# Patient Record
Sex: FEMALE | Race: WHITE | NOT HISPANIC OR LATINO | Employment: OTHER | URBAN - METROPOLITAN AREA
[De-identification: names, ages, dates, MRNs, and addresses within clinical notes are randomized per-mention and may not be internally consistent; named-entity substitution may affect disease eponyms.]

---

## 2017-05-30 ENCOUNTER — HOSPITAL ENCOUNTER (OUTPATIENT)
Dept: CT IMAGING | Facility: CLINIC | Age: 67
Discharge: HOME | End: 2017-05-30
Attending: PSYCHIATRY & NEUROLOGY | Admitting: PSYCHIATRY & NEUROLOGY
Payer: MEDICARE

## 2017-05-30 DIAGNOSIS — S06.9XAA: ICD-10-CM

## 2017-05-30 DIAGNOSIS — S06.5XAA SUBDURAL HEMATOMA (H): ICD-10-CM

## 2017-05-30 PROCEDURE — 70450 CT HEAD/BRAIN W/O DYE: CPT

## 2018-01-22 ENCOUNTER — HOSPITAL ENCOUNTER (EMERGENCY)
Facility: CLINIC | Age: 68
Discharge: ANOTHER HOSPITAL | End: 2018-01-23
Attending: EMERGENCY MEDICINE | Admitting: EMERGENCY MEDICINE
Payer: MEDICARE

## 2018-01-22 ENCOUNTER — APPOINTMENT (OUTPATIENT)
Dept: CT IMAGING | Facility: CLINIC | Age: 68
End: 2018-01-22
Attending: EMERGENCY MEDICINE
Payer: MEDICARE

## 2018-01-22 DIAGNOSIS — R41.82 ALTERED MENTAL STATUS, UNSPECIFIED ALTERED MENTAL STATUS TYPE: ICD-10-CM

## 2018-01-22 LAB
ALBUMIN SERPL-MCNC: 3.3 G/DL (ref 3.4–5)
ALP SERPL-CCNC: 95 U/L (ref 40–150)
ALT SERPL W P-5'-P-CCNC: 16 U/L (ref 0–50)
AMMONIA PLAS-SCNC: 18 UMOL/L (ref 10–50)
ANION GAP SERPL CALCULATED.3IONS-SCNC: 12 MMOL/L (ref 3–14)
AST SERPL W P-5'-P-CCNC: 18 U/L (ref 0–45)
BASE EXCESS BLDV CALC-SCNC: 0.2 MMOL/L
BASOPHILS # BLD AUTO: 0 10E9/L (ref 0–0.2)
BASOPHILS NFR BLD AUTO: 0.3 %
BILIRUB SERPL-MCNC: 0.5 MG/DL (ref 0.2–1.3)
BUN SERPL-MCNC: 24 MG/DL (ref 7–30)
CALCIUM SERPL-MCNC: 8.8 MG/DL (ref 8.5–10.1)
CHLORIDE SERPL-SCNC: 104 MMOL/L (ref 94–109)
CO2 SERPL-SCNC: 25 MMOL/L (ref 20–32)
CREAT SERPL-MCNC: 0.8 MG/DL (ref 0.52–1.04)
DIFFERENTIAL METHOD BLD: ABNORMAL
EOSINOPHIL # BLD AUTO: 0 10E9/L (ref 0–0.7)
EOSINOPHIL NFR BLD AUTO: 0.4 %
ERYTHROCYTE [DISTWIDTH] IN BLOOD BY AUTOMATED COUNT: 13.4 % (ref 10–15)
ETHANOL SERPL-MCNC: <0.01 G/DL
GFR SERPL CREATININE-BSD FRML MDRD: 72 ML/MIN/1.7M2
GLUCOSE SERPL-MCNC: 104 MG/DL (ref 70–99)
HCO3 BLDV-SCNC: 26 MMOL/L (ref 21–28)
HCT VFR BLD AUTO: 48.9 % (ref 35–47)
HGB BLD-MCNC: 15.7 G/DL (ref 11.7–15.7)
IMM GRANULOCYTES # BLD: 0.1 10E9/L (ref 0–0.4)
IMM GRANULOCYTES NFR BLD: 0.5 %
LYMPHOCYTES # BLD AUTO: 3.6 10E9/L (ref 0.8–5.3)
LYMPHOCYTES NFR BLD AUTO: 36.4 %
MAGNESIUM SERPL-MCNC: 2 MG/DL (ref 1.6–2.3)
MCH RBC QN AUTO: 29 PG (ref 26.5–33)
MCHC RBC AUTO-ENTMCNC: 32.1 G/DL (ref 31.5–36.5)
MCV RBC AUTO: 90 FL (ref 78–100)
MONOCYTES # BLD AUTO: 0.6 10E9/L (ref 0–1.3)
MONOCYTES NFR BLD AUTO: 6.4 %
NEUTROPHILS # BLD AUTO: 5.5 10E9/L (ref 1.6–8.3)
NEUTROPHILS NFR BLD AUTO: 56 %
NRBC # BLD AUTO: 0 10*3/UL
NRBC BLD AUTO-RTO: 0 /100
O2/TOTAL GAS SETTING VFR VENT: NORMAL %
OXYHGB MFR BLDV: 41 %
PCO2 BLDV: 47 MM HG (ref 40–50)
PH BLDV: 7.36 PH (ref 7.32–7.43)
PLATELET # BLD AUTO: 281 10E9/L (ref 150–450)
PO2 BLDV: 25 MM HG (ref 25–47)
POTASSIUM SERPL-SCNC: 3.3 MMOL/L (ref 3.4–5.3)
PROT SERPL-MCNC: 7.3 G/DL (ref 6.8–8.8)
RBC # BLD AUTO: 5.42 10E12/L (ref 3.8–5.2)
SODIUM SERPL-SCNC: 141 MMOL/L (ref 133–144)
T4 FREE SERPL-MCNC: 1.05 NG/DL (ref 0.76–1.46)
TROPONIN I SERPL-MCNC: <0.015 UG/L (ref 0–0.04)
TSH SERPL DL<=0.005 MIU/L-ACNC: 17.43 MU/L (ref 0.4–4)
WBC # BLD AUTO: 9.9 10E9/L (ref 4–11)

## 2018-01-22 PROCEDURE — 82805 BLOOD GASES W/O2 SATURATION: CPT | Performed by: EMERGENCY MEDICINE

## 2018-01-22 PROCEDURE — 84484 ASSAY OF TROPONIN QUANT: CPT | Performed by: EMERGENCY MEDICINE

## 2018-01-22 PROCEDURE — 80053 COMPREHEN METABOLIC PANEL: CPT | Performed by: EMERGENCY MEDICINE

## 2018-01-22 PROCEDURE — 70450 CT HEAD/BRAIN W/O DYE: CPT

## 2018-01-22 PROCEDURE — 84439 ASSAY OF FREE THYROXINE: CPT | Performed by: EMERGENCY MEDICINE

## 2018-01-22 PROCEDURE — 82550 ASSAY OF CK (CPK): CPT | Performed by: EMERGENCY MEDICINE

## 2018-01-22 PROCEDURE — 85025 COMPLETE CBC W/AUTO DIFF WBC: CPT | Performed by: EMERGENCY MEDICINE

## 2018-01-22 PROCEDURE — 80320 DRUG SCREEN QUANTALCOHOLS: CPT | Performed by: EMERGENCY MEDICINE

## 2018-01-22 PROCEDURE — 82140 ASSAY OF AMMONIA: CPT | Performed by: EMERGENCY MEDICINE

## 2018-01-22 PROCEDURE — 84443 ASSAY THYROID STIM HORMONE: CPT | Performed by: EMERGENCY MEDICINE

## 2018-01-22 PROCEDURE — 83735 ASSAY OF MAGNESIUM: CPT | Performed by: EMERGENCY MEDICINE

## 2018-01-22 PROCEDURE — 99285 EMERGENCY DEPT VISIT HI MDM: CPT | Mod: 25

## 2018-01-22 PROCEDURE — 93005 ELECTROCARDIOGRAM TRACING: CPT

## 2018-01-22 ASSESSMENT — ENCOUNTER SYMPTOMS: CONFUSION: 1

## 2018-01-23 ENCOUNTER — HOSPITAL ENCOUNTER (OUTPATIENT)
Facility: CLINIC | Age: 68
Setting detail: OBSERVATION
Discharge: NURSING, REHAB OR LONG-TERM CARE | End: 2018-01-24
Attending: INTERNAL MEDICINE | Admitting: INTERNAL MEDICINE
Payer: MEDICARE

## 2018-01-23 VITALS
DIASTOLIC BLOOD PRESSURE: 90 MMHG | HEART RATE: 60 BPM | SYSTOLIC BLOOD PRESSURE: 115 MMHG | TEMPERATURE: 99 F | RESPIRATION RATE: 18 BRPM | OXYGEN SATURATION: 97 %

## 2018-01-23 DIAGNOSIS — Z98.890 HISTORY OF RESECTION OF MENINGIOMA: ICD-10-CM

## 2018-01-23 DIAGNOSIS — R41.0 CONFUSION: Primary | ICD-10-CM

## 2018-01-23 DIAGNOSIS — Z86.79 HISTORY OF SUBARACHNOID HEMORRHAGE: ICD-10-CM

## 2018-01-23 DIAGNOSIS — E03.9 ACQUIRED HYPOTHYROIDISM: ICD-10-CM

## 2018-01-23 DIAGNOSIS — Z86.03 HISTORY OF RESECTION OF MENINGIOMA: ICD-10-CM

## 2018-01-23 DIAGNOSIS — G62.9 PERIPHERAL POLYNEUROPATHY: ICD-10-CM

## 2018-01-23 LAB
ALBUMIN UR-MCNC: 30 MG/DL
ALBUMIN UR-MCNC: NEGATIVE MG/DL
AMPHETAMINES UR QL SCN: NEGATIVE
APPEARANCE UR: ABNORMAL
APPEARANCE UR: CLEAR
BARBITURATES UR QL: NEGATIVE
BENZODIAZ UR QL: NEGATIVE
BILIRUB UR QL STRIP: NEGATIVE
BILIRUB UR QL STRIP: NEGATIVE
CANNABINOIDS UR QL SCN: NEGATIVE
CK SERPL-CCNC: 192 U/L (ref 30–225)
COCAINE UR QL: NEGATIVE
COLOR UR AUTO: YELLOW
COLOR UR AUTO: YELLOW
FOLATE SERPL-MCNC: 16.2 NG/ML
GLUCOSE UR STRIP-MCNC: NEGATIVE MG/DL
GLUCOSE UR STRIP-MCNC: NEGATIVE MG/DL
HGB UR QL STRIP: ABNORMAL
HGB UR QL STRIP: ABNORMAL
INTERPRETATION ECG - MUSE: NORMAL
KETONES UR STRIP-MCNC: 80 MG/DL
KETONES UR STRIP-MCNC: 80 MG/DL
LEUKOCYTE ESTERASE UR QL STRIP: ABNORMAL
LEUKOCYTE ESTERASE UR QL STRIP: NEGATIVE
MUCOUS THREADS #/AREA URNS LPF: PRESENT /LPF
MUCOUS THREADS #/AREA URNS LPF: PRESENT /LPF
NITRATE UR QL: NEGATIVE
NITRATE UR QL: NEGATIVE
OPIATES UR QL SCN: NEGATIVE
PCP UR QL SCN: NEGATIVE
PH UR STRIP: 5 PH (ref 5–7)
PH UR STRIP: 5 PH (ref 5–7)
POTASSIUM SERPL-SCNC: 3.7 MMOL/L (ref 3.4–5.3)
RBC #/AREA URNS AUTO: 4 /HPF (ref 0–2)
RBC #/AREA URNS AUTO: 7 /HPF (ref 0–2)
SOURCE: ABNORMAL
SOURCE: ABNORMAL
SP GR UR STRIP: 1.02 (ref 1–1.03)
SP GR UR STRIP: 1.03 (ref 1–1.03)
SQUAMOUS #/AREA URNS AUTO: 14 /HPF (ref 0–1)
UROBILINOGEN UR STRIP-MCNC: 2 MG/DL (ref 0–2)
UROBILINOGEN UR STRIP-MCNC: 4 MG/DL (ref 0–2)
VIT B12 SERPL-MCNC: 1704 PG/ML (ref 193–986)
WBC #/AREA URNS AUTO: 2 /HPF (ref 0–2)
WBC #/AREA URNS AUTO: 33 /HPF (ref 0–2)

## 2018-01-23 PROCEDURE — 25000132 ZZH RX MED GY IP 250 OP 250 PS 637: Mod: GY | Performed by: INTERNAL MEDICINE

## 2018-01-23 PROCEDURE — 80307 DRUG TEST PRSMV CHEM ANLYZR: CPT | Performed by: EMERGENCY MEDICINE

## 2018-01-23 PROCEDURE — A9270 NON-COVERED ITEM OR SERVICE: HCPCS | Mod: GY | Performed by: INTERNAL MEDICINE

## 2018-01-23 PROCEDURE — 84132 ASSAY OF SERUM POTASSIUM: CPT | Performed by: INTERNAL MEDICINE

## 2018-01-23 PROCEDURE — 81001 URINALYSIS AUTO W/SCOPE: CPT | Mod: XU | Performed by: EMERGENCY MEDICINE

## 2018-01-23 PROCEDURE — 99207 ZZC APP CREDIT; MD BILLING SHARED VISIT: CPT | Performed by: INTERNAL MEDICINE

## 2018-01-23 PROCEDURE — G0378 HOSPITAL OBSERVATION PER HR: HCPCS

## 2018-01-23 PROCEDURE — 99204 OFFICE O/P NEW MOD 45 MIN: CPT | Performed by: NURSE PRACTITIONER

## 2018-01-23 PROCEDURE — 99220 ZZC INITIAL OBSERVATION CARE,LEVL III: CPT | Performed by: INTERNAL MEDICINE

## 2018-01-23 PROCEDURE — 82746 ASSAY OF FOLIC ACID SERUM: CPT | Performed by: INTERNAL MEDICINE

## 2018-01-23 PROCEDURE — 80177 DRUG SCRN QUAN LEVETIRACETAM: CPT | Performed by: INTERNAL MEDICINE

## 2018-01-23 PROCEDURE — 82550 ASSAY OF CK (CPK): CPT | Performed by: EMERGENCY MEDICINE

## 2018-01-23 PROCEDURE — 82607 VITAMIN B-12: CPT | Performed by: INTERNAL MEDICINE

## 2018-01-23 PROCEDURE — 36415 COLL VENOUS BLD VENIPUNCTURE: CPT | Performed by: INTERNAL MEDICINE

## 2018-01-23 RX ORDER — LEVOTHYROXINE SODIUM 88 UG/1
88 TABLET ORAL DAILY
Status: DISCONTINUED | OUTPATIENT
Start: 2018-01-23 | End: 2018-01-23

## 2018-01-23 RX ORDER — ACETAMINOPHEN 325 MG/1
650 TABLET ORAL EVERY 4 HOURS PRN
Status: DISCONTINUED | OUTPATIENT
Start: 2018-01-23 | End: 2018-01-24 | Stop reason: HOSPADM

## 2018-01-23 RX ORDER — SIMVASTATIN 20 MG
20 TABLET ORAL AT BEDTIME
COMMUNITY

## 2018-01-23 RX ORDER — ACETAMINOPHEN 650 MG/1
650 SUPPOSITORY RECTAL EVERY 4 HOURS PRN
Status: DISCONTINUED | OUTPATIENT
Start: 2018-01-23 | End: 2018-01-24 | Stop reason: HOSPADM

## 2018-01-23 RX ORDER — POTASSIUM CHLORIDE 1500 MG/1
20-40 TABLET, EXTENDED RELEASE ORAL
Status: DISCONTINUED | OUTPATIENT
Start: 2018-01-23 | End: 2018-01-24 | Stop reason: HOSPADM

## 2018-01-23 RX ORDER — POTASSIUM CHLORIDE 1.5 G/1.58G
20-40 POWDER, FOR SOLUTION ORAL
Status: DISCONTINUED | OUTPATIENT
Start: 2018-01-23 | End: 2018-01-24 | Stop reason: HOSPADM

## 2018-01-23 RX ORDER — AMOXICILLIN 250 MG
2 CAPSULE ORAL 2 TIMES DAILY PRN
Status: DISCONTINUED | OUTPATIENT
Start: 2018-01-23 | End: 2018-01-24 | Stop reason: HOSPADM

## 2018-01-23 RX ORDER — LEVOTHYROXINE SODIUM 100 UG/1
100 TABLET ORAL DAILY
Status: DISCONTINUED | OUTPATIENT
Start: 2018-01-24 | End: 2018-01-24 | Stop reason: HOSPADM

## 2018-01-23 RX ORDER — ONDANSETRON 2 MG/ML
4 INJECTION INTRAMUSCULAR; INTRAVENOUS EVERY 6 HOURS PRN
Status: DISCONTINUED | OUTPATIENT
Start: 2018-01-23 | End: 2018-01-24 | Stop reason: HOSPADM

## 2018-01-23 RX ORDER — GABAPENTIN 300 MG/1
600 CAPSULE ORAL 3 TIMES DAILY
Status: DISCONTINUED | OUTPATIENT
Start: 2018-01-23 | End: 2018-01-24 | Stop reason: HOSPADM

## 2018-01-23 RX ORDER — GABAPENTIN 300 MG/1
600 CAPSULE ORAL 3 TIMES DAILY
COMMUNITY
End: 2018-02-07 | Stop reason: DRUGHIGH

## 2018-01-23 RX ORDER — POLYETHYLENE GLYCOL 3350 17 G/17G
17 POWDER, FOR SOLUTION ORAL DAILY PRN
Status: DISCONTINUED | OUTPATIENT
Start: 2018-01-23 | End: 2018-01-24 | Stop reason: HOSPADM

## 2018-01-23 RX ORDER — LEVETIRACETAM 500 MG/1
500 TABLET ORAL 2 TIMES DAILY
Status: DISCONTINUED | OUTPATIENT
Start: 2018-01-23 | End: 2018-01-24 | Stop reason: HOSPADM

## 2018-01-23 RX ORDER — METHYLPREDNISOLONE 4 MG/1
4 TABLET ORAL DAILY
Status: ON HOLD | COMMUNITY
End: 2018-01-23

## 2018-01-23 RX ORDER — FLUOXETINE 20 MG/1
10 TABLET, FILM COATED ORAL DAILY
COMMUNITY

## 2018-01-23 RX ORDER — NALOXONE HYDROCHLORIDE 0.4 MG/ML
.1-.4 INJECTION, SOLUTION INTRAMUSCULAR; INTRAVENOUS; SUBCUTANEOUS
Status: DISCONTINUED | OUTPATIENT
Start: 2018-01-23 | End: 2018-01-24 | Stop reason: HOSPADM

## 2018-01-23 RX ORDER — POTASSIUM CL/LIDO/0.9 % NACL 10MEQ/0.1L
10 INTRAVENOUS SOLUTION, PIGGYBACK (ML) INTRAVENOUS
Status: DISCONTINUED | OUTPATIENT
Start: 2018-01-23 | End: 2018-01-24 | Stop reason: HOSPADM

## 2018-01-23 RX ORDER — FLUOXETINE 20 MG/1
20 TABLET, FILM COATED ORAL DAILY
Status: ON HOLD | COMMUNITY
End: 2018-01-23

## 2018-01-23 RX ORDER — POTASSIUM CHLORIDE 7.45 MG/ML
10 INJECTION INTRAVENOUS
Status: DISCONTINUED | OUTPATIENT
Start: 2018-01-23 | End: 2018-01-24 | Stop reason: HOSPADM

## 2018-01-23 RX ORDER — BISACODYL 10 MG
10 SUPPOSITORY, RECTAL RECTAL DAILY PRN
Status: DISCONTINUED | OUTPATIENT
Start: 2018-01-23 | End: 2018-01-24 | Stop reason: HOSPADM

## 2018-01-23 RX ORDER — AMOXICILLIN 250 MG
1 CAPSULE ORAL 2 TIMES DAILY PRN
Status: DISCONTINUED | OUTPATIENT
Start: 2018-01-23 | End: 2018-01-24 | Stop reason: HOSPADM

## 2018-01-23 RX ORDER — POTASSIUM CHLORIDE 29.8 MG/ML
20 INJECTION INTRAVENOUS
Status: DISCONTINUED | OUTPATIENT
Start: 2018-01-23 | End: 2018-01-24 | Stop reason: HOSPADM

## 2018-01-23 RX ORDER — LEVETIRACETAM 500 MG/1
500 TABLET ORAL 2 TIMES DAILY
COMMUNITY

## 2018-01-23 RX ORDER — ONDANSETRON 4 MG/1
4 TABLET, ORALLY DISINTEGRATING ORAL EVERY 6 HOURS PRN
Status: DISCONTINUED | OUTPATIENT
Start: 2018-01-23 | End: 2018-01-24 | Stop reason: HOSPADM

## 2018-01-23 RX ORDER — LEVOTHYROXINE SODIUM 88 UG/1
88 TABLET ORAL DAILY
Status: ON HOLD | COMMUNITY
End: 2018-01-24

## 2018-01-23 RX ADMIN — FLUOXETINE 20 MG: 20 CAPSULE ORAL at 11:06

## 2018-01-23 RX ADMIN — GABAPENTIN 600 MG: 300 CAPSULE ORAL at 21:35

## 2018-01-23 RX ADMIN — GABAPENTIN 600 MG: 300 CAPSULE ORAL at 16:48

## 2018-01-23 RX ADMIN — LEVETIRACETAM 500 MG: 500 TABLET, FILM COATED ORAL at 12:56

## 2018-01-23 RX ADMIN — ACETAMINOPHEN 650 MG: 325 TABLET, FILM COATED ORAL at 12:02

## 2018-01-23 RX ADMIN — LEVOTHYROXINE SODIUM 88 MCG: 88 TABLET ORAL at 11:06

## 2018-01-23 RX ADMIN — LEVETIRACETAM 500 MG: 500 TABLET, FILM COATED ORAL at 21:35

## 2018-01-23 RX ADMIN — GABAPENTIN 600 MG: 300 CAPSULE ORAL at 11:06

## 2018-01-23 NOTE — ED NOTES
Bed: ED03  Expected date: 1/22/18  Expected time: 10:40 PM  Means of arrival: Ambulance  Comments:  BV4

## 2018-01-23 NOTE — PLAN OF CARE
Problem: Patient Care Overview  Goal: Plan of Care/Patient Progress Review  PT/OT: Order received and chart reviewed.  Per discussion with RN, patient is continuing to undergo medical work-up and continues to be under observation status, may change to inpatient status. Will hold PT/OT evaluations at this time, while medical work-up continues and hospital plan continues to be established. Recommend that RN staff continue to mobilize patient throughout the day.

## 2018-01-23 NOTE — IP AVS SNAPSHOT
` `           [place] [address] [facility]: [phone]                                              INTERAGENCY TRANSFER FORM - NURSING   2018                    Hospital Admission Date: 2018  RUTHANN FRANCIS   : 1950  Sex: Female        Attending Provider: Bassam Arana DO     Allergies:  Penicillins, Contrast Dye    Infection:  None   Service:  INTERNAL MED    Ht:  --   Wt:  58.5 kg (128 lb 15.5 oz)   Admission Wt:  58.5 kg (128 lb 15.5 oz)    BMI:  --   BSA:  --            Patient PCP Information     Provider PCP Type    [facility]      Current Code Status     Date Active Code Status Order ID Comments User Context       Prior      Code Status History     Date Active Date Inactive Code Status Order ID Comments User Context    2018 12:34 PM  DNR/DNI [id number]  Rai Melgoza MD Outpatient    2018  6:08 AM 2018 12:34 PM DNR/DNI [id number]  Bassam Arana DO Inpatient    2014  7:27 PM 2014  2:47 PM Full Code [id number]  Cortney Mederos RN Inpatient      Advance Directives        Scanned docmt in ACP Activity?           Yes        Hospital Problems as of 2018              Priority Class Noted POA    Confusion Medium  2018 Yes      Non-Hospital Problems as of 2018              Priority Class Noted    Multinodular goiter Medium  2012    S/P thyroidectomy Medium  2012    Sacral fracture (H) Medium  2014      Immunizations     None         END      ASSESSMENT     Discharge Profile Flowsheet     EXPECTED DISCHARGE     Resources List  Skilled Nursing Facility 14 1603    Expected Discharge Date  18 (Pending OT eval) 18 1617   SKIN      DISCHARGE NEEDS ASSESSMENT     Inspection of bony prominences  Full 18 1614    Equipment Used at Home  none 14 0901   Inspection under devices  Full 18 1614    GASTROINTESTINAL (ADULT,PEDIATRIC,OB)     Skin WDL   "ex;characteristics 01/24/18 1614    GI WDL  WDL 01/24/18 1614   Skin Moisture  dry 01/24/18 1614    Last Bowel Movement  01/24/18 01/24/18 1614   Skin Integrity  bruise(s);scab(s);scar(s) 01/24/18 1614    Passing flatus  no 01/24/18 0144   SAFETY      COMMUNICATION ASSESSMENT     Safety WDL  WDL 01/24/18 1614    Patient's communication style  spoken language (English or Bilingual) 01/23/18 0547   All Alarms  alarm(s) activated and audible 01/24/18 1614    FINAL RESOURCES                        Assessment WDL (Within Defined Limits) Definitions           Safety WDL     Effective: 09/28/15    Row Information: <b>WDL Definition:</b> Bed in low position, wheels locked; call light in reach; upper side rails up x 2; ID band on<br> <font color=\"gray\"><i>Item=AS safety wdl>>List=AS safety wdl>>Version=F14</i></font>      Skin WDL     Effective: 09/28/15    Row Information: <b>WDL Definition:</b> Warm; dry; intact; elastic; without discoloration; pressure points without redness<br> <font color=\"gray\"><i>Item=AS skin wdl>>List=AS skin wdl>>Version=F14</i></font>      Vitals     Vital Signs Flowsheet     VITAL SIGNS     Weight  58.5 kg (128 lb 15.5 oz) 01/23/18 0530    Temp  98.8  F (37.1  C) 01/24/18 1604   Weight Method  Bed scale 01/23/18 0530    Temp src  Oral 01/24/18 1604   EKG MONITORING      Resp  16 01/24/18 1604   Cardiac Regularity  Regular 01/22/18 2255    Pulse  60 01/24/18 1604   Cardiac Rhythm  NSR 01/22/18 2255    Heart Rate  55 01/24/18 1243   WILFREDO COMA SCALE      Pulse/Heart Rate Source  Monitor 01/24/18 1604   Best Eye Response  4-->(E4) spontaneous 01/23/18 0439    BP  118/52 01/24/18 1604   Best Motor Response  6-->(M6) obeys commands 01/23/18 0439    BP Location  Left arm 01/24/18 1604   Best Verbal Response  5-->(V5) oriented 01/23/18 0439    OXYGEN THERAPY     Wilfredo Coma Scale Score  15 01/23/18 0439    SpO2  95 % 01/24/18 1604   POSITIONING      O2 Device  None (Room air) 01/24/18 1604   Body " Position  supine 01/24/18 1605    PAIN/COMFORT     Head of Bed (HOB)  HOB at 20-30 degrees 01/24/18 1605    Patient Currently in Pain  denies 01/24/18 0811   DAILY CARE      0-10 Pain Scale  7 01/23/18 1203   Activity Management  up in chair 01/24/18 1243    HEIGHT AND WEIGHT     Activity Assistance Provided  assistance, 1 person 01/24/18 0934            Patient Lines/Drains/Airways Status    Active LINES/DRAINS/AIRWAYS     Name: Placement date: Placement time: Site: Days: Last dressing change:    Wound 04/08/14 Left Calf  1cm wide by 4cm long 04/08/14   1438   Calf   1387     Incision/Surgical Site 08/31/12 Neck 08/31/12       1972             Patient Lines/Drains/Airways Status    Active PICC/CVC     None            Intake/Output Detail Report     Date Intake   Output Net    Shift P.O. IV Piggyback Total Urine Total       Day 01/23/18 0700 - 01/23/18 1459 360 -- 360 -- -- 360    Franci 01/23/18 1500 - 01/23/18 2259 240 -- 240 200 200 40    Noc 01/23/18 2300 - 01/24/18 0659 100 -- 100 -- -- 100    Day 01/24/18 0700 - 01/24/18 1459 280 -- 280 -- -- 280    Franci 01/24/18 1500 - 01/24/18 2259 -- -- -- -- -- 0      Last Void/BM       Most Recent Value    Urine Occurrence 1 at 01/24/2018 1243    Stool Occurrence       Case Management/Discharge Planning     Case Management/Discharge Planning Flowsheet     REFERRAL INFORMATION     EXPECTED DISCHARGE      Did the Initial Social Work Assessment result in a Social Work Case?  Yes 01/24/18 1035   Expected Discharge Date  01/24/18 (Pending OT eval) 01/23/18 1617    Arrived From  home 04/02/14 0955   DISCHARGE PLANNING      Referral Source  physician 01/24/18 1035   Equipment Used at Home  none 04/03/14 0901    Reason For Consult  discharge planning 01/24/18 1035   FINAL NOTE      Record Reviewed  medical record 01/24/18 1035   Final Note  -- (D/C to [facility]) 01/24/18 1355    CTS Assigned to Case  -- (Jorge Vicente) 01/24/18 1035   FINAL RESOURCES      LIVING ENVIRONMENT      Resources List  Skilled Nursing Facility 04/18/14 1603    Lives With  alone 01/24/18 1042   ABUSE RISK SCREEN      Living Arrangements  apartment 01/24/18 1042   QUESTION TO PATIENT:  Has a member of your family or a partner(now or in the past) intimidated, hurt, manipulated, or controlled you in any way?  no 01/23/18 0554    ASSESSMENT OF FAMILY/SOCIAL SUPPORT     QUESTION TO PATIENT: Do you feel safe going back to the place where you are living?  yes 01/23/18 0554    Marital Status  Single 01/24/18 1035   OBSERVATION: Is there reason to believe there has been maltreatment of a vulnerable adult (ie. Physical/Sexual/Emotional abuse, self neglect, lack of adequate food, shelter, medical care, or financial exploitation)?  no 01/23/18 0554    COPING/STRESS     OTHER      Major Change/Loss/Stressor  housing concerns;residence change 01/23/18 0554   Are you depressed or being treated for depression?  Yes 01/23/18 0554    Patient Personal Strengths  able to adapt 04/02/14 0914

## 2018-01-23 NOTE — PLAN OF CARE
Problem: Patient Care Overview  Goal: Plan of Care/Patient Progress Review  Outcome: No Change  Pt remains alert to self only, calm and coop. Up with 1 assist to chair, would require more assist and walker to amb further. VSS. C/O back pain, repos enc, PRN tylenol given and hot pack provided. Jose diet, fair appetite. Inc urine. Neuro saw. Obs status.

## 2018-01-23 NOTE — ED PROVIDER NOTES
History     Chief Complaint:  Altered Mental Status    History is limited due to patient's altered mental status.    HPI   Carri Arias is a 67 year old female who presents to the emergency department via EMS today for evaluation of an altered mental status. EMS reports that the patient informed friends today that she was in [place] and that the friends, questioning the validity of this statement, called 911 to conduct a welfare check. They report that, upon arrival, the patient's door was open and she was on the floor confused and incontinent of urine. EMS notes that she has not been compliant with her medications. They add that the patient has someone to regularly check in on her, however they are not sure of who this is or the last time a check-in occurred. En route, the patient's blood sugar was 109.     Here in the emergency department, the patient reports that she feels fine and has been compliant with her medications. She notes a history of neuropathy. Patient has no complaints and wants to go home. Of note, the patient had recent imaging as per below and notes that she had recent brain surgery.    MR Head and Brain  INDICATION:    Meningioma. History of resection.    IMPRESSION:    1. Gross total resection of the anterior interhemispheric falcine meningioma with no evidence of recurrent/residual neoplasm.    2. Mild dural thickening overlying the right frontal lobe likely representing postoperative change.    3. Mild atrophy.    4. Mild to moderate supratentorial white matter change that is nonspecific but in a patient of this age likely due to chronic small vessel ischemic change.  Reading per radiology    Allergies:  Penicillins  Contrast Dye     Medications:    Tylenol  Ketoprofen  Celebrex  Atarax  Ativan  Gabapentin  Lidoderm  Levothyroxine Sodium  Celexa    Past Medical History:    Arthritis  Hypercholesterolemia  Hypothyroidism  Tremors    Past Surgical History:    Appendectomy  Hysterectomy:  total abdominal, bilateral, salpingo-oophorectomy, combined  Lumpectomy breast  Thyroidectomy  Tonsillectomy  Meningioma Resection    Family History:    No family history on file.    Social History:  Smoking Status: Current Every Day Smoker  Smokeless Tobacco: Not on file  Alcohol Use: Positive  Marital Status: Single     Review of Systems   Genitourinary:        Incontinent in urine.   Psychiatric/Behavioral: Positive for confusion.   All other systems reviewed and are negative.    Physical Exam     Patient Vitals for the past 24 hrs:   BP Temp Temp src Pulse Heart Rate Resp SpO2   01/23/18 0315 116/62 - - - 46 - 98 %   01/23/18 0300 - - - - 53 - -   01/23/18 0245 147/41 - - - - - 92 %   01/23/18 0230 116/71 - - - 46 - (!) 74 %   01/23/18 0215 131/62 - - - 44 - 98 %   01/23/18 0200 125/59 - - - 48 - 100 %   01/23/18 0145 122/62 - - - 48 - 99 %   01/23/18 0130 135/65 - - - 49 - 92 %   01/23/18 0115 129/50 - - - 47 - 100 %   01/23/18 0100 124/64 - - - 56 - 99 %   01/23/18 0045 123/71 - - - 45 11 100 %   01/23/18 0030 119/43 - - - - - -   01/23/18 0015 129/68 - - - 52 15 99 %   01/23/18 0000 (!) 104/32 - - - 50 11 99 %   01/22/18 2251 140/65 99  F (37.2  C) Oral 60 - 19 -      Physical Exam  General:   Age appropriate, disheveled female who smells of urine.  HEENT:    Oropharynx is dry without lesions or trismus.     No scalp hematoma or defect to bony calvarium     TMs clear  Eyes:    Conjunctiva normal     PERRL; EOMs intact  Neck:    Supple, no meningismus.     CV:     Regular rate and rhythm.      No murmurs, rubs or gallops.       No JVD or unilateral leg swelling.       2+ radial pulses bilateral.       No lower extremity edema.  PULM:    Clear to auscultation bilateral.       No respiratory distress.      Good air exchange.     No rales or wheezing.     No stridor.  ABD:    Soft, non-tender, non-distended.       No pulsatile masses.       No rebound, guarding or rigidity.  MSK:     No gross deformity to all  four extremities.   LYMPH:   No cervical lymphadenopathy.  NEURO:   Alert; oriented to person and place but not time.      CN II-XII intact, speech is clear with no aphasia.       Strength is 5/5 in all 4 extremities.  Sensation is intact.       Normal muscular tone, no tremor.  Skin:    Warm, dry and intact.    Psych:    Mood is good and affect is appropriate.    Emergency Department Course     ECG:  ECG taken at 2254, ECG read at 2254  Normal sinus rhythm  Normal ECG  Rate 61 bpm. HI interval 136 ms. QRS duration 62 ms. QT/QTc 410/412 ms. P-R-T axes 55 61 75.    Imaging:  Radiology findings were communicated with the patient who voiced understanding of the findings.    Head CT w/o contrast  1. No acute findings.  2. Interval right frontal craniotomy with resection of the previously  seen mass along the anterior falx.  3. Small area of hyperdensity beneath the craniotomy site likely  represents some dural thickening/calcification and potentially some  minimal residual subdural blood. This is likely chronic, postoperative  change. Comparison with more recent postoperative study would be  helpful to confirm this.  Reading per radiology    Laboratory:  Laboratory findings were communicated with the patient who voiced understanding of the findings.    UA (Collected: 0142): Ketones: 80 (A), Blood: Small (A), RBC/HPF: 4 (H), Mucous: Present (A)  UA (Collected: 0057): Ketones: 80 (A), Blood: Moderate (A), Protein Albumin: 30 (A), Urobilinogen 4.0 (H), Leukocyte Esterase: Trace (A), WBC/HPF: 33 (H), RBC/HPF: 7 (H), Squamous Epithelial/HPF: 14 (H), Mucous: Present (A)   Urine Drug Screen: Negative     Blood Gas Venous and Oxyhemoglobin (Collected: 2300): pH: 7.36, PCO2: 47, PO2: 25, Bicarbonate: 26, FlO2: Room Air, Oxyhemoglobin: 41, Base Excess: 0.2    CBC: WBC 9.9, HGB 15.7,   CMP: Potassium 3.3 (L), Glucose 104 (H), Albumin 3.3 (L) o/w WNL (Creatinine 0.80)  Troponin I (Collected 2257): <0.015  Magnesium:  2.0  TSH: 17.43 (H)  Ammonia: 18  Alcohol Level Serum: <0.01     Free T4: 1.05  CK total: Pending    Emergency Department Course:    2241 Report received from EMS.     2242 I performed an exam of the patient as documented above.     2257 IV was inserted and blood was drawn for laboratory testing, results above.     2316 The patient was sent for a Head CT while in the emergency department, results above.       2340 I spoke with Dr. Lance Mack of roentgenology regarding patient's presentation, findings, and plan of care.      0100 The patient provided a urine sample here in the emergency department. This was sent for laboratory testing, findings above.     0316 I discussed the treatment plan with the patient. They expressed understanding of this plan and consented to transfer to [facility]. I discussed the patient with Dr. Fischer at [facility], who will admit the patient to a monitored bed for further evaluation and treatment.     I personally reviewed the laboratory, imaging, and EKG results with the patient and answered all related questions prior to transferring the patient to [facility].    Impression & Plan      Medical Decision Making:  Carri Arias is a 67 year old female who presents to the emergency department today for evaluation of altered mental status.  Patient was disoriented and found at home on the floor unable to get up and incontinent of urine.  At time of presentation, she has no focal neurological deficit.  CT scan shows postsurgical changes consistent with recent meningioma resection without complicating factors.  There is no obvious evidence of infection, electrolyte disturbance, hypoglycemia.  No specific toxidrome noted on examination.  The cause of her symptoms/altered mental status, at this point is uncertain.  Clearly she requires further investigation and workup.  Unfortunately there are no beds available at [facility]. Patient will be transferred to  [facility] for admission to medical bed.    Diagnosis:    ICD-10-CM    1. Altered mental status, unspecified altered mental status type R41.82 UA with Microscopic     CK total     CK total     CANCELED: CK total     Disposition:   The patient is transferred to [facility] and admitted into the care of Dr. Aaron Fischer.    Scribe Disclosure:  I, Ilsa Mcclain, am serving as a scribe at 11:19 PM on 1/22/2018 to document services personally performed by Mark Steward MD based on my observations and the provider's statements to me.    [facility] EMERGENCY DEPARTMENT       Mark Steward MD  01/23/18 0419

## 2018-01-23 NOTE — IP AVS SNAPSHOT
` ` Patient Information     Patient Name Sex     Carri Arias ([id number]) Female 1950       Room Bed    6603 6603-02      Patient Demographics     Address Phone    [address]   [place] [address] [phone] (Home)  none (Work)  [phone] (Mobile)      Patient Ethnicity & Race     Ethnic Group Patient Race    Choose not to answer White      Emergency Contact(s)     Name Relation Home Work Mobile    neil Miguel [phone] non [phone]      Documents on File        Status Date Received Description       Documents for the Patient    Privacy Notice - [place] Received 08/15/12     Insurance Card Received 08/15/12     External Medication Information Consent       Patient ID Received 17 r    Consent for Services - Hospital/Clinic  ()      Consent for Services - Hospital/Clinic Received () 08/15/12     Privacy Notice - [place] Received 12     Consent for EHR Access  13 Copied from existing Consent for services - C/HOD collected on 08/15/2012    [facility] Specified Other       Insurance Card Received 17     Consent for Services - Hospital/Clinic Received () 14     [place] Authorization  10/08/14 [place] DDS    [place] Authorization  05/19/15 [facility] - 2015    [place] Authorization  17 DS/FR    Consent for Services/Privacy Notice - Hospital/Clinic Received 17     Insurance Card Received 17     Care Everywhere Prospective Auth Received 18     Occupational Therapy Certification Sent 18        Documents for the Encounter    CMS IM for Patient Signature       Observation Notice Received 18     CMS WEIR for Patient Signature Received 18       Admission Information     Attending Provider Admitting Provider Admission Type Admission Date/Time    Bassam Arana, Bassam Tenorio,  Urgent 18  0523    Discharge Date Hospital Service Auth/Cert Status Service Area      Internal Medicine Incomplete [facility] SERVICES    Unit Room/Bed Admission Status       SH 66 MED SPEC UNIT 6603/6603-02 Admission (Confirmed)       Admission     Complaint    altered mental status pt on flight hold, Confusion      Hospital Account     Name Acct ID Class Status Primary Coverage    Carri Arias [id number] Observation Open MEDICARE - MEDICARE            Guarantor Account (for Hospital Account #[id number])     Name Relation to Pt Service Area Active? Acct Type    Carri Arias Self FCS Yes Personal/Family    Address Phone          [address] [place] [address] [phone](H)  none(O)              Coverage Information (for Hospital Account #[id number])     1. MEDICARE/MEDICARE     F/O Payor/Plan Precert #    MEDICARE/MEDICARE     Subscriber Subscriber #    Carri Arias [id number]    Address Phone    [employer]  [address]  [place] [address] [phone]          2. [place]     [place]     Subscriber Subscriber #    Carri Arias [id number]    Address Phone    [address] [address]  [place] [address] [phone]

## 2018-01-23 NOTE — ED NOTES
[facility]  ED Nurse Handoff Report    Carri Arias is a 67 year old female   ED Chief complaint: Altered Mental Status  . ED Diagnosis:   Final diagnoses:   None     Allergies:   Allergies   Allergen Reactions     Penicillins Shortness Of Breath and Hives     Chest heaviness     Contrast Dye Hives       Code Status: Full Code  Activity level - Baseline/Home:  Independent. Activity Level - Current:   Stand with Assist of 2. Lift room needed: No. Bariatric: No   Needed: No   Isolation: No. Infection: Not Applicable.     Vital Signs:   Vitals:    01/22/18 2251 01/23/18 0000 01/23/18 0015 01/23/18 0030   BP: 140/65 (!) 104/32 129/68 119/43   Pulse: 60      Resp: 19 11 15    Temp: 99  F (37.2  C)      TempSrc: Oral      SpO2:  99% 99%        Cardiac Rhythm:  ,   Cardiac  Cardiac Rhythm: Normal sinus rhythm  Pain level:    Patient confused: Yes. Patient Falls Risk: Yes.   Elimination Status: Has voided   Patient Report - Initial Complaint: Altered Mental Status.   Focused Assessment: Cognitive/Perceptual/Neuro - Cognitive/Neuro/Behavioral WDL: orientation Orientation: disoriented to; place; time; situation Best Language: 0 - No aphasia Headache: None LUE Sensation: no numbness; no tingling RUE Sensation: no numbness; no tingling LLE Sensation: no numbness; no tingling RLE Sensation: no numbness; no tingling  Pupils (CN II) - Pupil PERRLA: yes  [place] Coma Scale - Best Eye Response: 4-->(E4) spontaneous Best Motor Response: 6-->(M6) obeys commands Best Verbal Response: 4-->(V4) confused Neal Coma Scale Score: 14  NIH Stroke Scale - Interval: baseline 1a. Level Of Consciousness: 0-->Alert: keenly responsive 1b. LOC Questions: 2-->Answers neither question correctly 1c. LOC Commands: 0-->Performs both tasks correctly 2. Best Gaze: 0-->Normal 3. Visual: 0-->No visual loss 4. Facial Palsy: 0-->Normal symmetrical movements 5a. Motor Arm, Left: 0-->No drift: limb holds 90 (or 45) degrees for  full 10 secs 5b. Motor Arm, Right: 0-->No drift: limb holds 90 (or 45) degrees for full 10 secs 6a. Motor Leg, Left: 0-->No drift: leg holds 30 degree position for full 5 secs 6b. Motor Leg, Right: 0-->No drift: leg holds 30 degree position for full 5 secs 7. Limb Ataxia: 0-->Absent 8. Sensory: 0-->Normal: no sensory loss 9. Best Language: 0-->No aphasia: normal 10. Dysarthria: 0-->Normal 11. Extinction and Inattention (formerly Neglect): 0-->No abnormality Total (NIH Stroke Scale): 2   Abnormal Results:   Labs Ordered and Resulted from Time of ED Arrival Up to the Time of Departure from the ED   CBC WITH PLATELETS DIFFERENTIAL - Abnormal; Notable for the following:        Result Value    RBC Count 5.42 (*)     Hematocrit 48.9 (*)     All other components within normal limits   COMPREHENSIVE METABOLIC PANEL - Abnormal; Notable for the following:     Potassium 3.3 (*)     Glucose 104 (*)     Albumin 3.3 (*)     All other components within normal limits   TSH WITH FREE T4 REFLEX - Abnormal; Notable for the following:     TSH 17.43 (*)     All other components within normal limits   TROPONIN I   MAGNESIUM   AMMONIA   BLOOD GAS VENOUS AND OXYHGB   ALCOHOL ETHYL   T4 FREE   ROUTINE UA WITH MICROSCOPIC   DRUG ABUSE SCREEN 77 URINE (FL, RH, SH)   PERIPHERAL IV CATHETER     Head CT w/o contrast   Final Result   IMPRESSION:   1. No acute findings.   2. Interval right frontal craniotomy with resection of the previously   seen mass along the anterior falx.   3. Thin linear hyperdensity beneath the craniotomy site likely   represents some dural thickening/calcification and potentially some   minimal residual subdural blood. This is likely chronic, postoperative   change. Comparison with more recent postoperative study would be   helpful to confirm this.      Findings discussed with the ER physician at 11:40 p.m.      KACI HOLDEN MD        .   Treatments provided: fluids  Family Comments: n/a  OBS brochure/video  discussed/provided to patient:  No  ED Medications:   Medications   0.9% sodium chloride BOLUS (not administered)     Drips infusing:  No  For the majority of the shift, the patient's behavior Green. Interventions performed were pt education, therapeutic communication.     Severe Sepsis OR Septic Shock Diagnosis Present: No      ED Nurse Name/Phone Number: Bassam Irizarry,   12:46 AM

## 2018-01-23 NOTE — IP AVS SNAPSHOT
[facility] [facility]    [address] [place] [address]    Phone:  [phone]                                       After Visit Summary   1/23/2018    Carri Arias    MRN: [id number]           After Visit Summary Signature Page     I have received my discharge instructions, and my questions have been answered. I have discussed any challenges I see with this plan with the nurse or doctor.    ..........................................................................................................................................  Patient/Patient Representative Signature      ..........................................................................................................................................  Patient Representative Print Name and Relationship to Patient    ..................................................               ................................................  Date                                            Time    ..........................................................................................................................................  Reviewed by Signature/Title    ...................................................              ..............................................  Date                                                            Time

## 2018-01-23 NOTE — IP AVS SNAPSHOT
ShawneeCarri spencer CHIN #[id number] (CSN: [id number])  (67 year old F)  (Adm: 18)     [id number]               [place] [address] [facility]: [phone]            Patient Demographics     Patient Name Sex          Age SSN Address Phone    ShawneeCarri spencer Female 1950 (67 year old) [id number]   [facility] [address] [phone] (Home)  none (Work)  [phone] (Mobile)      Emergency Contact(s)     Name Relation Home Work Mobile    neil Miguel Sister [phone] non [phone]      Admission Information     Attending Provider Admitting Provider Admission Type Admission Date/Time    Bassam Arana, Bassam Tenorio,  Urgent 18  0523    Discharge Date Hospital Service Auth/Cert Status Service Area     Internal Medicine Incomplete [facility]    Unit Room/Bed Admission Status       [facility] MED SPEC UNIT 6603/6603-02 Admission (Confirmed)       Admission     Complaint    altered mental status pt on flight hold, Confusion      Hospital Account     Name Acct ID Class Status Primary Coverage    ShawneeCarri [id number] Observation Open MEDICARE - MEDICARE            Guarantor Account (for Hospital Account #[id number])     Name Relation to Pt Service Area Active? Acct Type    Carri Arias Self FCS Yes Personal/Family    Address Phone          [phone] [address] [phone](H)  none(O)              Coverage Information (for Hospital Account #[id number])     1. MEDICARE/MEDICARE     F/O Payor/Plan Precert #    MEDICARE/MEDICARE     Subscriber Subscriber #    ShawneeCarri spencer CHIN [id number]    Address Phone    [employer] CLAIMS  [address]  [place] [address] [phone]          2. [place]     F[place]     Subscriber Subscriber #    HugoCarri [id number]    Address Phone    [address]  [address]  [place] [address] [phone]                                                      INTERAGENCY TRANSFER FORM - PHYSICIAN ORDERS   1/23/2018                       [place] [address] [facility]IT: [phone]            Attending Provider: Bassam Arana DO     Allergies:  Penicillins, Contrast Dye    Infection:  None   Service:  INTERNAL MED    Ht:  --   Wt:  58.5 kg (128 lb 15.5 oz)   Admission Wt:  58.5 kg (128 lb 15.5 oz)    BMI:  --   BSA:  --            ED Clinical Impression     Diagnosis Description Comment Added By Time Added    Confusion [R41.0] Confusion [R41.0]  Coco Jacob APRN CNP 1/23/2018 11:24 AM    History of resection of meningioma [Z98.890] History of resection of meningioma [Z98.890]  Coco Jacob APRN CNP 1/23/2018 11:24 AM    History of subarachnoid hemorrhage [Z86.79] History of subarachnoid hemorrhage [Z86.79]  Coco Jacob APRN CNP 1/23/2018 11:24 AM    Peripheral polyneuropathy [G62.9] Peripheral polyneuropathy [G62.9]  Coco Jacob APRN CNP 1/23/2018 11:24 AM    Acquired hypothyroidism [E03.9] Acquired hypothyroidism [E03.9]  Coco Jacob APRN CNP 1/23/2018 11:25 AM      Hospital Problems as of 1/24/2018              Priority Class Noted POA    Confusion Medium  1/23/2018 Yes      Non-Hospital Problems as of 1/24/2018              Priority Class Noted    Multinodular goiter Medium  8/31/2012    S/P thyroidectomy Medium  9/1/2012    Sacral fracture (H) Medium  4/1/2014      Code Status History     Date Active Date Inactive Code Status Order ID Comments User Context    1/24/2018 12:34 PM  DNR/DNI [id number]  Rai Melgoza MD Outpatient    1/23/2018  6:08 AM 1/24/2018 12:34 PM DNR/DNI [id number]  Bassam Arana DO Inpatient    4/1/2014  7:27 PM 4/19/2014  2:47 PM Full Code [id number]  Cortney Mederos RN Inpatient      Current Code Status     Date Active Code Status Order ID Comments User Context        Prior      Summary of Visit     Reason for your hospital stay       You presented with confusion. No clear source of your confusion was found other than an elevated TSH which we will attempt to correct by increasing your Synthroid dose.                Medication Review      CONTINUE these medications which may have CHANGED, or have new prescriptions. If we are uncertain of the size of tablets/capsules you have at home, strength may be listed as something that might have changed.        Dose / Directions Comments    levothyroxine 100 MCG tablet   Commonly known as:  SYNTHROID/LEVOTHROID   This may have changed:    - medication strength  - how much to take   Used for:  Acquired hypothyroidism        Dose:  100 mcg   Start taking on:  1/25/2018   Take 1 tablet (100 mcg) by mouth daily   Quantity:  30 tablet   Refills:  3          CONTINUE these medications which have NOT CHANGED        Dose / Directions Comments    Calcium-Vitamin D 600-400 MG-UNIT Tabs        Dose:  1 tablet   Take 1 tablet by mouth 2 times daily   Refills:  0        FLUoxetine 20 MG tablet        Dose:  20 mg   Take 20 mg by mouth every morning   Refills:  0        gabapentin 300 MG capsule   Commonly known as:  NEURONTIN        Dose:  600 mg   Take 600 mg by mouth 3 times daily   Refills:  0        KEPPRA 500 MG tablet   Generic drug:  levETIRAcetam        Dose:  500 mg   Take 500 mg by mouth 2 times daily   Refills:  0        simvastatin 20 MG tablet   Commonly known as:  ZOCOR        Dose:  20 mg   Take 20 mg by mouth At Bedtime   Refills:  0                After Care     Activity - Up with nursing assistance           Advance Diet as Tolerated       Follow this diet upon discharge: Orders Placed This Encounter        Regular Diet Adult       Daily weights       Call Provider for weight gain of more than 2 pounds per day or 5 pounds per week.       Fall precautions           General info for SNF       Length of Stay Estimate: Short Term Care:  Estimated # of Days <30  Condition at Discharge: Improving  Level of care:skilled   Rehabilitation Potential: Good  Admission H&P remains valid and up-to-date: Yes  Recent Chemotherapy: N/A  Use Nursing Home Standing Orders: Yes       Intake and output       Every shift       Mantoux instructions       Give two-step Mantoux (PPD) Per Facility Policy Yes               Further instructions from your care team       [facility] phone, [phone]    Referrals     Occupational Therapy Adult Consult       Evaluate and treat as clinically indicated.    Reason:  Deconditioning and Cognitive impairment.       Physical Therapy Adult Consult       Evaluate and treat as clinically indicated.    Reason:  Deconditioning             Follow-Up Appointment Instructions     Follow Up and recommended labs and tests       Follow up with senior living physician.  The following labs/tests are recommended: Will need to follow up with a repeat Keppra level in 1 week. This was subtherapeutic on admission and suspect non-compliance.     - Will need to ensure follow up with her pcp in 4-6 weeks to re-check her TFT's to ensure TSH is down trending.             Statement of Approval     Ordered          01/24/18 1235  I have reviewed and agree with all the recommendations and orders detailed in this document.  EFFECTIVE NOW     Approved and electronically signed by:  Ria Melgoza MD                                                 INTERAGENCY TRANSFER FORM - NURSING   1/23/2018                       [place] [address] MEDICAL SPECIALTY UNIT: [phone]            Attending Provider: Bassam Arana DO     Allergies:  Penicillins, Contrast Dye    Infection:  None   Service:  INTERNAL MED    Ht:  --   Wt:  58.5 kg (128 lb 15.5 oz)   Admission Wt:  58.5 kg (128 lb 15.5 oz)    BMI:  --   BSA:  --            Advance Directives        Scanned docmt in ACP Activity?           Yes        Immunizations     None     "  ASSESSMENT     Discharge Profile Flowsheet     EXPECTED DISCHARGE     Resources List  Skilled Nursing Facility 04/18/14 1603    Expected Discharge Date  01/24/18 (Pending OT eval) 01/23/18 1617   SKIN      DISCHARGE NEEDS ASSESSMENT     Inspection of bony prominences  Full 01/24/18 1614    Equipment Used at Home  none 04/03/14 0901   Inspection under devices  Full 01/24/18 1614    GASTROINTESTINAL (ADULT,PEDIATRIC,OB)     Skin WDL  ex;characteristics 01/24/18 1614    GI WDL  WDL 01/24/18 1614   Skin Moisture  dry 01/24/18 1614    Last Bowel Movement  01/24/18 01/24/18 1614   Skin Integrity  bruise(s);scab(s);scar(s) 01/24/18 1614    Passing flatus  no 01/24/18 0144   SAFETY      COMMUNICATION ASSESSMENT     Safety WDL  WDL 01/24/18 1614    Patient's communication style  spoken language (English or Bilingual) 01/23/18 0547   All Alarms  alarm(s) activated and audible 01/24/18 1614    FINAL RESOURCES                        Assessment WDL (Within Defined Limits) Definitions           Safety WDL     Effective: 09/28/15    Row Information: <b>WDL Definition:</b> Bed in low position, wheels locked; call light in reach; upper side rails up x 2; ID band on<br> <font color=\"gray\"><i>Item=AS safety wdl>>List=AS safety wdl>>Version=F14</i></font>      Skin WDL     Effective: 09/28/15    Row Information: <b>WDL Definition:</b> Warm; dry; intact; elastic; without discoloration; pressure points without redness<br> <font color=\"gray\"><i>Item=AS skin wdl>>List=AS skin wdl>>Version=F14</i></font>      Vitals     Vital Signs Flowsheet     VITAL SIGNS     Weight  58.5 kg (128 lb 15.5 oz) 01/23/18 0530    Temp  98.8  F (37.1  C) 01/24/18 1604   Weight Method  Bed scale 01/23/18 0530    Temp src  Oral 01/24/18 1604   EKG MONITORING      Resp  16 01/24/18 1604   Cardiac Regularity  Regular 01/22/18 2255    Pulse  60 01/24/18 1604   Cardiac Rhythm  NSR 01/22/18 2255    Heart Rate  55 01/24/18 1243   WILFREDO COMA SCALE      Pulse/Heart " Rate Source  Monitor 01/24/18 1604   Best Eye Response  4-->(E4) spontaneous 01/23/18 0439    BP  118/52 01/24/18 1604   Best Motor Response  6-->(M6) obeys commands 01/23/18 0439    BP Location  Left arm 01/24/18 1604   Best Verbal Response  5-->(V5) oriented 01/23/18 0439    OXYGEN THERAPY     Neal Coma Scale Score  15 01/23/18 0439    SpO2  95 % 01/24/18 1604   POSITIONING      O2 Device  None (Room air) 01/24/18 1604   Body Position  supine 01/24/18 1605    PAIN/COMFORT     Head of Bed (HOB)  HOB at 20-30 degrees 01/24/18 1605    Patient Currently in Pain  denies 01/24/18 0811   DAILY CARE      0-10 Pain Scale  7 01/23/18 1203   Activity Management  up in chair 01/24/18 1243    HEIGHT AND WEIGHT     Activity Assistance Provided  assistance, 1 person 01/24/18 0934            Patient Lines/Drains/Airways Status    Active LINES/DRAINS/AIRWAYS     Name: Placement date: Placement time: Site: Days: Last dressing change:    Wound 04/08/14 Left Calf  1cm wide by 4cm long 04/08/14   1438   Calf   1387     Incision/Surgical Site 08/31/12 Neck 08/31/12       1972             Patient Lines/Drains/Airways Status    Active PICC/CVC     None            Intake/Output Detail Report     Date Intake   Output Net    Shift P.O. IV Piggyback Total Urine Total       Day 01/23/18 0700 - 01/23/18 1459 360 -- 360 -- -- 360    Franci 01/23/18 1500 - 01/23/18 2259 240 -- 240 200 200 40    Noc 01/23/18 2300 - 01/24/18 0659 100 -- 100 -- -- 100    Day 01/24/18 0700 - 01/24/18 1459 280 -- 280 -- -- 280    Franci 01/24/18 1500 - 01/24/18 2259 -- -- -- -- -- 0      Last Void/BM       Most Recent Value    Urine Occurrence 1 at 01/24/2018 1243    Stool Occurrence       Case Management/Discharge Planning     Case Management/Discharge Planning Flowsheet     REFERRAL INFORMATION     EXPECTED DISCHARGE      Did the Initial Social Work Assessment result in a Social Work Case?  Yes 01/24/18 1035   Expected Discharge Date  01/24/18 (Pending OT eval)  01/23/18 1617    Arrived From  home 04/02/14 0955   DISCHARGE PLANNING      Referral Source  physician 01/24/18 1035   Equipment Used at Home  none 04/03/14 0901    Reason For Consult  discharge planning 01/24/18 1035   FINAL NOTE      Record Reviewed  medical record 01/24/18 1035   Final Note  -- (D/C to [facility]) 01/24/18 1355    CTS Assigned to Case  -- (Jorge Vicente) 01/24/18 1035   FINAL RESOURCES      LIVING ENVIRONMENT     Resources List  Skilled Nursing Facility 04/18/14 1603    Lives With  alone 01/24/18 1042   ABUSE RISK SCREEN      Living Arrangements  apartment 01/24/18 1042   QUESTION TO PATIENT:  Has a member of your family or a partner(now or in the past) intimidated, hurt, manipulated, or controlled you in any way?  no 01/23/18 0554    ASSESSMENT OF FAMILY/SOCIAL SUPPORT     QUESTION TO PATIENT: Do you feel safe going back to the place where you are living?  yes 01/23/18 0554    Marital Status  Single 01/24/18 1035   OBSERVATION: Is there reason to believe there has been maltreatment of a vulnerable adult (ie. Physical/Sexual/Emotional abuse, self neglect, lack of adequate food, shelter, medical care, or financial exploitation)?  no 01/23/18 0554    COPING/STRESS     OTHER      Major Change/Loss/Stressor  housing concerns;residence change 01/23/18 0554   Are you depressed or being treated for depression?  Yes 01/23/18 0554    Patient Personal Strengths  able to adapt 04/02/14 0955                       [place] [address] MEDICAL SPECIALTY UNIT: [phone]            Medication Administration Report for Carri Arias as of 01/24/18 1626   Legend:    Given Hold Not Given Due Canceled Entry Other Actions    Time Time (Time) Time  Time-Action       Inactive    Active    Linked        Medications 01/18/18 01/19/18 01/20/18 01/21/18 01/22/18 01/23/18 01/24/18    acetaminophen (TYLENOL) Suppository 650 mg  Dose: 650 mg  Freq: EVERY 4 HOURS PRN Route: RE  PRN Reason: mild pain  Start:  01/23/18 0607   Admin Instructions: Alternate ibuprofen (if ordered) with acetaminophen.  Maximum acetaminophen dose from all sources = 75 mg/kg/day not to exceed 4 grams/day.               acetaminophen (TYLENOL) tablet 650 mg  Dose: 650 mg  Freq: EVERY 4 HOURS PRN Route: PO  PRN Reason: mild pain  Start: 01/23/18 0607   Admin Instructions: Alternate ibuprofen (if ordered) with acetaminophen.  Maximum acetaminophen dose from all sources = 75 mg/kg/day not to exceed 4 grams/day.          1202 (650 mg)-Given            bisacodyl (DULCOLAX) Suppository 10 mg  Dose: 10 mg  Freq: DAILY PRN Route: RE  PRN Reason: constipation  Start: 01/23/18 0608   Admin Instructions: Hold for loose stools.  This is the third step of a three step constipation treatment.               FLUoxetine (PROzac) capsule 20 mg  Dose: 20 mg  Freq: EVERY MORNING Route: PO  Start: 01/23/18 1045         1106 (20 mg)-Given        0801 (20 mg)-Given           gabapentin (NEURONTIN) capsule 600 mg  Dose: 600 mg  Freq: 3 TIMES DAILY Route: PO  Start: 01/23/18 1045         1106 (600 mg)-Given       1648 (600 mg)-Given       2135 (600 mg)-Given        0801 (600 mg)-Given       1607 (600 mg)-Given       [ ] 2200           levETIRAcetam (KEPPRA) tablet 500 mg  Dose: 500 mg  Freq: 2 TIMES DAILY Route: PO  Start: 01/23/18 1045         1256 (500 mg)-Given       2135 (500 mg)-Given        0801 (500 mg)-Given       [ ] 2100           levothyroxine (SYNTHROID/LEVOTHROID) tablet 100 mcg  Dose: 100 mcg  Freq: DAILY Route: PO  Start: 01/24/18 0900   Admin Instructions: Separate oral administration of iron- or calcium-containing products and levothyroxine by at least 4 hours.           0801 (100 mcg)-Given           naloxone (NARCAN) injection 0.1-0.4 mg  Dose: 0.1-0.4 mg  Freq: EVERY 2 MIN PRN Route: IV  PRN Reason: opioid reversal  Start: 01/23/18 0607   Admin Instructions: For respiratory rate LESS than or EQUAL to 8.  Partial reversal dose:  0.1 mg titrated q 2  minutes for Analgesia Side Effects Monitoring Sedation Level of 3 (frequently drowsy, arousable, drifts to sleep during conversation).Full reversal dose:  0.4 mg bolus for Analgesia Side Effects Monitoring Sedation Level of 4 (somnolent, minimal or no response to stimulation).  For ordered doses up to 2mg give IVP. Give each 0.4mg over 15 seconds in emergency situations. For non-emergent situations further dilute in 9mL of NS to facilitate titration of response.               ondansetron (ZOFRAN-ODT) ODT tab 4 mg  Dose: 4 mg  Freq: EVERY 6 HOURS PRN Route: PO  PRN Reasons: nausea,vomiting  Start: 01/23/18 0607   Admin Instructions: This is Step 1 of nausea and vomiting management.  If nausea not resolved in 15 minutes, go to Step 2 prochlorperazine (COMPAZINE). Do not push through foil backing. Peel back foil and gently remove. Place on tongue immediately. Administration with liquid unnecessary  With dry hands, peel back foil backing and gently remove tablet; do not push oral disintegrating tablet through foil backing; administer immediately on tongue and oral disintegrating tablet dissolves in seconds; then swallow with saliva; liquid not required.              Or  ondansetron (ZOFRAN) injection 4 mg  Dose: 4 mg  Freq: EVERY 6 HOURS PRN Route: IV  PRN Reasons: nausea,vomiting  Start: 01/23/18 0607   Admin Instructions: This is Step 1 of nausea and vomiting management.  If nausea not resolved in 15 minutes, go to Step 2 prochlorperazine (COMPAZINE).  Irritant. For ordered doses up to 4 mg, give IV Push undiluted over 2-5 minutes.               polyethylene glycol (MIRALAX/GLYCOLAX) Packet 17 g  Dose: 17 g  Freq: DAILY PRN Route: PO  PRN Reason: constipation  Start: 01/23/18 0608   Admin Instructions: Give in 8oz of  water, juice, or soda. Hold for loose stools.  This is the second step of a three step constipation treatment.  1 Packet = 17 grams. Mixed prescribed dose in 8 ounces of water. Follow with 8 oz. of  water.               potassium chloride (KLOR-CON) Packet 20-40 mEq  Dose: 20-40 mEq  Freq: EVERY 2 HOURS PRN Route: ORAL OR FEED  PRN Reason: potassium supplementation  Start: 01/23/18 0610   Admin Instructions: Use if unable to tolerate tablets.  If Serum K+ 3.0-3.3, dose = 60 mEq po total dose (40 mEq x1 followed in 2 hours by 20 mEq x1). Recheck K+ level 4 hours after dose and the next AM.  If Serum K+ 2.5-2.9, dose = 80 mEq po total dose (40 mEq Q2H x2). Recheck K+ level 4 hours after dose and the next AM.  If Serum K+ less than 2.5, See IV order.  Dissolve packet contents in 4-8 ounces of cold water or juice.               potassium chloride 10 mEq in 100 mL intermittent infusion with 10 mg lidocaine  Dose: 10 mEq  Freq: EVERY 1 HOUR PRN Route: IV  PRN Reason: potassium supplementation  Start: 01/23/18 0610   Admin Instructions: Infuse via PERIPHERAL LINE. Use potassium with lidocaine for pain with peripheral administration.  If Serum K+ 3.0-3.3, dose = 10 mEq/hr x4 doses (40 mEq IV total dose). Recheck K+ level 2 hours after dose and the next AM.  If Serum K+ less than 3.0, dose = 10 mEq/hr x6 doses (60 mEq IV total dose). Recheck K+ level 2 hours after dose and the next AM.               potassium chloride 10 mEq in 100 mL sterile water intermittent infusion (premix)  Dose: 10 mEq  Freq: EVERY 1 HOUR PRN Route: IV  PRN Reason: potassium supplementation  Start: 01/23/18 0610   Admin Instructions: Infuse via PERIPHERAL LINE or CENTRAL LINE. Use for central line replacement if patient weight less than 65 kg, if patient is on TPN with high potassium content or if unit does not stock 20 mEq bags.   If Serum K+ 3.0-3.3, dose = 10 mEq/hr x4 doses (40 mEq IV total dose). Recheck K+ level 2 hours after dose and the next AM.   If Serum K+ less than 3.0, dose = 10 mEq/hr x6 doses (60 mEq IV total dose). Recheck K+ level 2 hours after dose and the next AM.               potassium chloride 20 mEq in 50 mL intermittent  infusion  Dose: 20 mEq  Freq: EVERY 1 HOUR PRN Route: IV  PRN Reason: potassium supplementation  Start: 01/23/18 0610   Admin Instructions: Infuse via CENTRAL LINE Only. May need EKG if less than 65 kg or on TPN - Max rate is 0.3 mEq/kg/hr for patients not on EKG monitoring.   If Serum K+ 3.0-3.3, dose = 20 mEq/hr x2 doses (40 mEq IV total dose). Recheck K+ level 2 hours after dose and the next AM.  If Serum K+ less than 3.0, dose = 20 mEq/hr x3 doses (60 mEq IV total dose). Recheck K+ level 2 hours after dose and the next AM.               potassium chloride SA (K-DUR/KLOR-CON M) CR tablet 20-40 mEq  Dose: 20-40 mEq  Freq: EVERY 2 HOURS PRN Route: PO  PRN Reason: potassium supplementation  Start: 01/23/18 0610   Admin Instructions: Use if able to take PO.   If Serum K+ 3.0-3.3, dose = 60 mEq po total dose (40 mEq x1 followed in 2 hours by 20 mEq x1). Recheck K+ level 4 hours after dose and the next AM.  If Serum K+ 2.5-2.9, dose = 80 mEq po total dose (40 mEq Q2H x2). Recheck K+ level 4 hours after dose and the next AM.  If Serum K+ less than 2.5, See IV order.  DO NOT CRUSH               senna-docusate (SENOKOT-S;PERICOLACE) 8.6-50 MG per tablet 1 tablet  Dose: 1 tablet  Freq: 2 TIMES DAILY PRN Route: PO  PRN Reason: constipation  Start: 01/23/18 0608   Admin Instructions: If no bowel movement in 24 hours, increase to 2 tablets PO.  Hold for loose stools.  This is the first step of a three step constipation treatment.              Or  senna-docusate (SENOKOT-S;PERICOLACE) 8.6-50 MG per tablet 2 tablet  Dose: 2 tablet  Freq: 2 TIMES DAILY PRN Route: PO  PRN Reason: constipation  Start: 01/23/18 0608   Admin Instructions: Hold for loose stools.  This is the first step of a three step constipation treatment.              Discontinued Medications  Medications 01/18/18 01/19/18 01/20/18 01/21/18 01/22/18 01/23/18 01/24/18         Dose: 88 mcg  Freq: DAILY Route: PO  Start: 01/23/18 1045   End: 01/23/18 1254   Admin  Instructions: Separate oral administration of iron- or calcium-containing products and levothyroxine by at least 4 hours.          1106 (88 mcg)-Given       1254-Med Discontinued                 INTERAGENCY TRANSFER FORM - NOTES (H&P, Discharge Summary, Consults, Procedures, Therapies)   1/23/2018                       [facility] MEDICAL SPECIALTY UNIT: [phone]               History & Physicals      H&P signed by Bassam Arana DO at 1/23/2018  9:02 PM      Author:  Bassam Arana DO Service:  Hospitalist Author Type:  Physician    Filed:  1/23/2018  9:02 PM Date of Service:  1/23/2018  6:26 AM Creation Time:  1/23/2018  7:37 AM    Status:  Signed :  Bassam Arana DO (Physician)         Admitted:     01/23/2018      MEDICINE ADMISSION:        DATE OF ADMISSION:  01/23/2018      PRIMARY CARE PHYSICIAN:  Mercedes Mays      CODE STATUS:  DNR/DNI.      CHIEF COMPLAINT:  Concern for confusion.      HISTORY OF PRESENT ILLNESS:  Ms. Arias is a 67-year-old female with a past medical history significant for meningioma, status post resection end of 2017, hypothyroidism, dyslipidemia, peripheral neuropathy, anxiety, previous TBI and reported cognitive defects, who presents to the Emergency Department at [facility] when a friend that she was confused.  History is obtained through discussion with the patient and chart review.  Per report, the patient was called by a friend yesterday who was concerned when the patient was making statements about being in [place] when she was clearly in [place].  The friend called EMS to have a welfare check on the patient.  When EMS arrived to the patient's home, they found her sitting on the floor and smelling of urine.  They brought her into the Emergency Department for further evaluation.        When I saw the patient here on the 6th floor at [facility], the patient could tell me what happened and that the police officers came into  her house and asked her why she was sitting on the floor, to which she told them that she simply sat down there.  She however notes that she cannot recall how she got to be in [place].  The patient has not had any other recent symptoms leading up to today.  She is a bit confused on timelines, as she said she had her surgery about 6 weeks ago though it was roughly 4 months ago.  She however does remember going to a rehab center near [facility].  She denies any chest discomfort, shortness of breath, fevers or chills, cough, abdominal discomfort, nausea or diarrhea.  She does have some back discomfort which she states is chronic.  There is no recent exacerbation of this.      While she was in the ED at [facility] [facility], she had a workup which was unremarkable.  Head CT showed chronic postoperative changes but nothing acute.  UA was bland.  Drug of abuse screen, ethanol level were negative.  CBC, BMP and LFTs also all unremarkable, save for a mildly low potassium at 3.3.  TSH was elevated to 17.4.  ABG was negative.  The hospital at [facility] had no beds and so she was sent to [facility] for further evaluation.      I did review the records from her hospitalization at [facility] for her meningioma resection.  It sounds like she has had a meningioma diagnosed since 2012, but it had been growing.  When she initially presented to [facility] in 03/2017 after a fall she had a subarachnoid hemorrhage.  She was scheduled for surgery late September but fell on the day of her surgery and again, had a subarachnoid hemorrhage, though they went ahead with evacuation of this and the meningioma resection.  Postoperatively, she had some issues with cognitive deficits, hallucinations potentially and was ultimately discharged to rehab.  There is cognitive deficits listed as one of her medical issues, though it is unclear exactly how severe these might be.  The patient has been getting home care per all the documentation in Care  Everywhere, though we cannot see any actual records of the home care visits.      PAST MEDICAL HISTORY:   1.  Hypothyroidism:  The patient has previously had a thyroid resection due to multinodular goiter.   2.  Dyslipidemia.   3.  Sacral fracture.   4.  Bradycardia.   5.  Meningioma, status post resection in 09/2017.   6.  Subarachnoid hemorrhage following a fall in 03/2017 and again in 09/2017.   7.  Peripheral neuropathy.   8.  Anxiety and depression.   9.  Insomnia.   10.  Osteopenia.   11.  Traumatic brain injury.      MEDICATIONS:    Prior to Admission medications    Medication Sig Last Dose Taking? Auth Provider   gabapentin (NEURONTIN) 300 MG capsule Take 600 mg by mouth 3 times daily 1/19/2018 Yes Unknown, Entered By History   simvastatin (ZOCOR) 20 MG tablet Take 20 mg by mouth At Bedtime 1/19/2018 Yes Unknown, Entered By History   levothyroxine (SYNTHROID/LEVOTHROID) 88 MCG tablet Take 88 mcg by mouth daily 1/19/2018 Yes Unknown, Entered By History   Calcium Carb-Cholecalciferol (CALCIUM-VITAMIN D) 600-400 MG-UNIT TABS Take 1 tablet by mouth 2 times daily 1/19/2018 Yes Unknown, Entered By History   FLUoxetine 20 MG tablet Take 20 mg by mouth every morning 1/19/2018 Yes Unknown, Entered By History   levETIRAcetam (KEPPRA) 500 MG tablet Take 500 mg by mouth 2 times daily Few weeks ago at Unknown time  Unknown, Entered By History           SOCIAL HISTORY:  The patient denies any use of tobacco or alcohol.      FAMILY HISTORY:  Mother had lung cancer.      ALLERGIES:  PENICILLIN AND CONTRAST DYE.      REVIEW OF SYSTEMS:  The complete review of systems reviewed and negative, save for the pertinent positives recorded in the HPI.      PHYSICAL EXAMINATION:   VITAL SIGNS:  Show a blood pressure of 131/65, heart rate of 60, respirations 18, satting 94% on room air with a temperature of 98.8 degrees Fahrenheit.   GENERAL:  The patient is lying in bed, smelling of urine but appears comfortable.   HEENT:  Pupils  equal, round, reactive to light, extraocular muscle function intact.  No scleral icterus.  Oropharynx is clear.   NECK:  No lymphadenopathy or thyromegaly.   CARDIOVASCULAR:  Heart is regular rate and rhythm without any murmur, rub or gallop.   PULMONARY:  Lungs are clear to auscultation bilaterally without wheeze or rhonchi.   GASTROINTESTINAL:  Positive bowel sounds, soft, nontender and nondistended.   SKIN:  No rash or lesion.   LYMPHATICS:  No peripheral edema.   PSYCHIATRIC:  The patient is alert and oriented x 3.  She was slightly off in the year, 2016, but otherwise got everything else right, in terms of date.   NEUROLOGIC:  Cranial nerves II-XII are grossly intact.  She has good muscle strength bilaterally.  No focal deficits noted.  Good coordination.      LABORATORY DATA:  CBC, BMP, LFTs unremarkable, save for a potassium of 3.3.  ABG unremarkable.  TSH is 17.43 with a free T4 of 1.05.  UA has 2 WBCs, 4 RBCs, small blood, but negative nitrites and leuk/esterase.      IMAGING:  Head CT shows chronic postoperative change with no acute findings.      ASSESSMENT AND PLAN:  Ms. Arias is a female with a past medical history significant for meningioma resection in 09/2017, hypothyroidism, dyslipidemia, cognitive deficits, subarachnoid hemorrhage and peripheral neuropathy, who presents to the Emergency Department at [facility] with concerns for confusion.   1.  Mild confusion with a history of cognitive deficits:  It is difficult for me to assess at this point how confused the patient is, as she seems to be doing fairly well and answering questions appropriately.  She does not have any signs of infection, no new acute issues, per her report and no neuro deficits.  I note she is on Keppra following her surgery and unclear if she is still on this but does not seem postictal and has not had any reported seizure activity.  Synthroid could be adjusted, but it is certainly not low enough that this though would  be causing significant confusion.  Ativan and hydroxyzine certainly could be causing some issues if she is taking these though again, this is unclear.  We will have OT assess her for cognitive deficits as well as PT to ensure she is safe to go home or any other additional resources that may be needed.  Social work to consult and help with disposition plan.  Could always consider a neurology or psych consult, but I do not know how much utility that would be at this point.   2.  Status post meningioma resection:  Head CT shows what appears to be chronic  postoperative change.  Could always consider a neurosurgery consult to look at the images, but the report does not look that impressive.   3.  Hypothyroidism:  TSH is elevated to 17.  Once we know her dose, I will increase this slightly and have her recheck with her PCP in 3-4 weeks.   4.  Mild hypokalemia:  Replace per protocol.   5.  Depression with anxiety:  Continue with SSRI, once confirmed.   6.  Back pain:  This is chronic.  I will have her get up and walking with nursing and therapies.  If she has a significantly worse pain, could consider spine imaging.   7.  Bradycardia:  Monitor for any hypotension or symptoms of bradycardia.   8.  Peripheral neuropathy.   9.  Disposition:  She will be under observation status for now, as I do not really see anything acute at this point that would make her inpatient.         CHANDRA PRETTY DO             D: 2018   T: 2018   MT: MEE      Name:     RUTHANN FRANCIS   MRN:      [id number]        Account:      [id number]   :      1950        Admitted:     2018                   Document: [id number][AM1.1]         Revision History        User Key Date/Time User Provider Type Action    > AM1.1 2018  9:02 PM Chandra Pretty DO Physician Sign     [N/A] 2018  7:37 AM Chandra Pretty DO Physician Edit                  Discharge Summaries     No notes of this type exist for this  encounter.         Consult Notes      Consults by Coco Jacob APRN CNP at 1/23/2018 11:33 AM     Author:  Coco Jacob APRN CNP Service:  Neurology Author Type:  Nurse Practitioner    Filed:  1/23/2018 12:00 PM Date of Service:  1/23/2018 11:33 AM Creation Time:  1/23/2018 10:55 AM    Status:  Signed :  Coco Jacob APRN CNP (Nurse Practitioner)     Consult Orders:    1. Neurology IP Consult: General (non-stroke/non-ICU); Patient to be seen: Routine - within 24 hours; Complex neuro/neurosurg hx past year with increasing confusion over past few weeks.; Consultant may enter orders: Yes [[id number]] ordered by Rubén Flores DO at 01/23/18 1034                [facility]    Neurology Consultation Note     Carri Arias MRN# [id number]   YOB: 1950 Age: 67 year old    Code Status:DNR/DNI   Date of Admission: 1/23/2018  Date of Consult: 01/23/2018    _________________________________   Primary Care Physician   [facility]    Reason for consult: I was asked by Dr. Flores to evaluate this patient for confusion        ______________________________________________         Assessment & Plan   ______________________________________________  #.[MC1.1] (R41.0) Confusion  (primary encounter diagnosis)[MC1.2]  --likely multifactorial  --increasing over preceding weeks  --significant elevation in TSH, reported cognitive deficits following meningioma resection  --placed on keppra s/p meningioma resection  -----keppra level pending[MC1.1]  --patient reports she often forgets to take her medications, needs assistance with medication management at home  ------may benefit from rehab stay instead of direct return to home  --will not get MRI brain at this time due to patient with minimal confusion and significant elevation in TSH, never returned to baseline after meningioma resection[MC1.3]  #.[MC1.1] (Z98.250) History of resection of  meningioma[MC1.2]  --resection at [facility][MC1.1] October[MC1.3] 2017  --CT with postoperative changes, no acute abnormalities  #.[MC1.1] (Z86.79) History of subarachnoid hemorrhage[MC1.2]  --March 2017 related to a fall  #.[MC1.1] (G62.9) Peripheral polyneuropathy[MC1.2]  --on gabapentin  mg TID  #.[MC1.1] (E03.9) Acquired hypothyroidism[MC1.2]  --significant elevation in TSH  ----hypothyroidism can cause confusion and memory loss  #. DVT Prophylaxis  --per primary service  #. PT/OT/Speech  --Start evaluations  #. Nutrition / GI Prophylaxis  --Per recommendations of speech therapy      #. Code Status: DNR / DNI      Chief Complaint   ______________________________________________  Confusion   History is obtained from the patient and electronic health record    History of Present Illness   ______________________________________________  Carri Arias is a 67 year old female who presented with confusion. History of meningioma, resected in[MC1.1] October[MC1.3] 2017 at [facility], previous TBI and reported cognitive defects. She had a fall in March 2013 with subarachnoid hemorrhage.  She was placed on keppra following meningioma resection. Friend was concerned the day prior to admission due to finding the patient confused on the phone. EMS was called for a welfare check. EMS found her on the floor smelling of urine and she was brought to the [facility] ED for evaluation. CT showed postoperative changes but no acute abnormalities. There were no available beds, so patient was transferred to [facility] for monitoring and further evaluation. Sister who lives in [place] reports increasing confusion noted in the past few weeks over the phone. She has had falls at home recently and family is concerned over her safety.    On exam,[MC1.1] patient is mildly confused. She provides the incorrect hospital name, but knows she is in a hospital, and is off by one day on orientation. She is  able to answer current events and historical recall questions and abstract thinking is intact. No focal weakness, sensation intact other than baseline neuropathy in feet. Reports falls a couple times per month, once with injury to her back. She reports she often forgets to take her medications. Memory is not quite back to what it used to be prior to surgery. She reports she was to get a machine to help her remember to take her medications, but she has not received it.[MC1.3]     Past Medical History    ______________________________________________  Past Medical History:   Diagnosis Date     Arthritis     knees and hips     Hypercholesterolemia      Hypothyroidism      Tremors     from thyroid?     Past Surgical History   ______________________________________________  Past Surgical History:   Procedure Laterality Date     APPENDECTOMY       HYSTERECTOMY TOTAL ABDOMINAL, BILATERAL SALPINGO-OOPHORECTOMY, COMBINED       LUMPECTOMY BREAST       THYROIDECTOMY  8/31/2012    Procedure: THYROIDECTOMY;  Total Thyroidectomy;  Surgeon: Melany Arellano MD;  Location: [facility] OR     TONSILLECTOMY       Prior to Admission Medications   ______________________________________________  Prior to Admission Medications   Prescriptions Last Dose Informant Patient Reported? Taking?   Calcium Carb-Cholecalciferol (CALCIUM-VITAMIN D) 600-400 MG-UNIT TABS 1/19/2018 Other Yes Yes   Sig: Take 1 tablet by mouth 2 times daily   FLUoxetine 20 MG tablet 1/19/2018 Other Yes Yes   Sig: Take 20 mg by mouth every morning   gabapentin (NEURONTIN) 300 MG capsule 1/19/2018 Other Yes Yes   Sig: Take 600 mg by mouth 3 times daily   levETIRAcetam (KEPPRA) 500 MG tablet Few weeks ago at Unknown time Other Yes No   Sig: Take 500 mg by mouth 2 times daily   levothyroxine (SYNTHROID/LEVOTHROID) 88 MCG tablet 1/19/2018 Other Yes Yes   Sig: Take 88 mcg by mouth daily   simvastatin (ZOCOR) 20 MG tablet 1/19/2018 Other Yes Yes   Sig: Take 20 mg by mouth At Bedtime       Facility-Administered Medications: None     Allergies   Allergies   Allergen Reactions     Penicillins Shortness Of Breath and Hives     Chest heaviness     Contrast Dye Hives       Social History   ______________________________________________  Social History     Social History     Marital status: Single     Spouse name: N/A     Number of children: N/A     Years of education: N/A     Social History Main Topics     Smoking status: Current Every Day Smoker     Smokeless tobacco: Not on file      Comment: 4-5 daily     Alcohol use Yes      Comment: rarely     Drug use: No     Sexual activity: Not on file     Other Topics Concern     Not on file     Social History Narrative     No narrative on file       Family History   ______________________________________________  Reviewed and not felt to be contributory.     Review of Systems   ______________________________________________  A comprehensive review of  10 systems was performed and found to be negative except as described in this note  CONSTITUTIONAL: negative for fever, chills, change in weight  INTEGUMENTARY/SKIN: no rash or obvious new lesions  ENT/MOUTH: no sore throat, new sinus pain or nasal drainage, no neck mass noted  RESP: No pleuretic pain, No cough, no hemoptysis, No SOB   CV: negative for chest pain, palpitations or peripheral edema  GI: no nausea, vomiting, change in stools  : no dysuria or hematuria  MUSCULOSKELETAL: no myalgias, arthralgias or join efffusion  ENDOCRINE: no history of polyuria, polydyspsia or symptoms of thyroid dysfunction  PSYCHIATRIC: no change in mood stable  LYMPHATIC: no new lymphadenopathy  HEME: no bleeding or easy bruisability  NEURO: see HPI    Physical Exam   ______________________________________________  Weight:128 lbs 15.51 oz; Height:Data Unavailable  Temp: 98  F (36.7  C) Temp src: Oral BP: 122/67 Pulse: 57 Heart Rate: 49 Resp: 18 SpO2: 98 % O2 Device: None (Room air)    General Appearance:  No acute  distress[MC1.1]  Neuro:       Mental Status Exam:   Awake, alert, oriented X2. Intermittent mild slurred speech, no clear aphasia. Oriented to current/historical events and recall, abstract thinking intact. Mental status is mildly confused       Cranial Nerves:  Pupils 3 mm, reactive. EOMI. Face sensation is normal. Face is symmetric. Tongue and uvula are midline. Other CN are normal           Motor:  Generalized weakness, symmetric. Tone and bulk are normal           Reflexes:  Normal DTR. Toes downgoing.        Sensory:  Normal to light touch               Coordination:   Intact finger-to-nose        Gait:  Up with assistance[MC1.3]  Neck: no nuchal rigidity, normal thyroid. No carotid bruits.    Cardiovascular: Regular rate and rhythm, no m/r/g  Lungs: Clear to auscultation  Abdomen: Soft, not tender, not distended  Extremities: No clubbing, no cyanosis, no edema    Data   ______________________________________________  All Data personally reviewed:       Labs:   CBC RESULTS:     Recent Labs  Lab 01/22/18 2257   WBC 9.9   RBC 5.42*   HGB 15.7   HCT 48.9*        Basic Metabolic Panel:   Recent Labs   Lab Test  01/23/18   0910  01/22/18 2257 04/17/14   0745  04/12/14   0630   NA   --   141  140  143   POTASSIUM  3.7  3.3*  4.2  4.1   CHLORIDE   --   104  105  108   CO2   --   25  30  29   BUN   --   24  13  13   CR   --   0.80  0.61  0.70   GLC   --   104*  94  88   VICKY   --   8.8  8.2*  8.3*     Liver panel:  Recent Labs   Lab Test  01/22/18 2257   PROTTOTAL  7.3   ALBUMIN  3.3*   BILITOTAL  0.5   ALKPHOS  95   AST  18   ALT  16     Troponin I:   Recent Labs   Lab Test  01/22/18 2257   TROPI  <0.015     Ammonia:   Recent Labs   Lab Test  01/22/18 2257   FLASH  18     CK:   Recent Labs   Lab Test  01/22/18 2257   CKT  192          Drug Screen: Recent Labs   Lab Test  01/23/18   0057   UAMP  Negative   UBARB  Negative   BENZODIAZEUR  Negative   UCANN  Negative   UCOC  Negative   OPIT  Negative    UPCP  Negative     Alcohol level:  Recent Labs   Lab Test  01/22/18   2257   ETOH  <0.01     UA Results:  Recent Labs   Lab Test  01/23/18   0142   COLOR  Yellow   APPEARANCE  Clear   URINEGLC  Negative   URINEBILI  Negative   URINEKETONE  80*   SG  1.024   UBLD  Small*   URINEPH  5.0   PROTEIN  Negative   NITRITE  Negative   LEUKEST  Negative   RBCU  4*   WBCU  2     Most Recent 6 Bacteria Isolates From Any Culture (See EPIC Reports for Culture Details):  Recent Labs   Lab Test  04/08/14   1328   CULT  >100,000 colonies/mL Escherichia coli*        Cardiac US:[MC1.1]   --[MC1.3]       Neurophysiology:[MC1.1]   --[MC1.3]       Imaging:   All imaging studies were reviewed personally  CT head 1/22/18:   1. No acute findings.  2. Interval right frontal craniotomy with resection of the previously seen mass along the anterior falx.  3. Thin linear hyperdensity beneath the craniotomy site likely represents some dural thickening/calcification and potentially some minimal residual subdural blood. This is likely chronic, postoperative change. Comparison with more recent postoperative study would be helpful to confirm this.        Text Page    Coco Jacob, KAREN, FNP-BC, RN CNRN[MC1.1]       Revision History        User Key Date/Time User Provider Type Action    > MC1.3 1/23/2018 12:00 PM Coco Jacob APRN CNP Nurse Practitioner Sign     MC1.2 1/23/2018 11:29 AM Coco Jacob APRN CNP Nurse Practitioner      MC1.1 1/23/2018 10:55 AM Coco Jacob APRN CNP Nurse Practitioner                      Progress Notes - Physician (Notes for yesterday and today)      Progress Notes by Rubén Flores DO at 1/23/2018 10:34 AM     Author:  Rubén Flores DO Service:  Hospitalist Author Type:  Physician    Filed:  1/23/2018 12:56 PM Date of Service:  1/23/2018 10:34 AM Creation Time:  1/23/2018 10:34 AM    Status:  Addendum :  Rubén Flores DO (Physician)         [facility]  Hospital    Hospitalist Progress Note  Name: Carri Arias    MRN: [id number]  Provider:  Rubén Flores DO, FHM (Text Page)    Assessment & Plan   Summary of Stay:  Ms. Arias is a 67-year-old female with a past medical history significant for meningioma, status post bleed and resection end of 2017, hypothyroidism, dyslipidemia, peripheral neuropathy, anxiety, previous TBI and reported cognitive defects, who presents to the Emergency Department at [facility] when a friend that she was confused.   She was then sent to [facility].    1.  Altered mentation with complex 2017 neuro/neurosurg history:  -  I spoke with the patients sister Yudy and her home care RN Cortney Pereyra.  The patients sister who lives in [place] reports increasing confusion noted on the phone the last few weeks.  Her home care RN reports the patient has not been answering her phone/door well and has had overt medication confusion lately.  They did a welfare check just a week ago and more home visits lately.  There is a lot of concern about home safety.  The patient has also had some falls though it is unclear how often the past few weeks.  The patient feels everyone is over concerned and she is fine.  Given the concerns in the context of her history, I will ask neurology to see her.  I also am checking keppra levels.  PT/OT for home safety assessments also pending. Continue monitoring for now.  There is no overt infection or other clear cause of acute confusion.    2.  Neuropathy:  -  Resume gabapentin    3.  TBI, meningioma resection fairly recently, subarach hem last year:  -  Keppra level  -  Neuro checks    4.  Hypothyroidism:  -  TSH high but free T4 normal range.  Levothyroxine to continue.  Consider slight dose adjustment with outpatient f/u.  This does not appear the cause of he significant confusion.[SS1.1]  (Addendum:  Neurology feels this may be contributing.  Her levothyroxine was increased here, recheck outpatient needed in 4  weeks.)[SS1.2]    DVT Prophylaxis: Low Risk/Ambulatory with no VTE prophylaxis indicated  Code Status: DNR/DNI    Disposition Plan   Expected discharge in 1-2 days to unclear, depends on further safety eval/neuro eval.     Entered: Rubén Flores 01/23/2018, 10:34 AM     Interval History   Assumed care for the day, following up from her arrival this AM.  Patient feels well now, denies acute complaints.  No pain, sob, nausea.  She chronically has occasional leg pain and would like her neurontin.    -Data reviewed today: I reviewed all new labs and imaging reports over the last 24 hours. I personally reviewed no images or EKG's today.    Physical Exam   Temp: 98  F (36.7  C) Temp src: Oral BP: 122/67 Pulse: 57 Heart Rate: 49 Resp: 18 SpO2: 98 % O2 Device: None (Room air)    Vitals:    01/23/18 0528   Weight: 58.5 kg (128 lb 15.5 oz)     Vital Signs with Ranges  Temp:  [98  F (36.7  C)-99  F (37.2  C)] 98  F (36.7  C)  Pulse:  [57-60] 57  Heart Rate:  [44-56] 49  Resp:  [11-19] 18  BP: ()/(32-90) 122/67  SpO2:  [74 %-100 %] 98 %       GEN:  Alert, oriented x to self and place, confused with lots of details but answers basic questions well, appears comfortable, NAD.   Appeared somewhat unsteady when up transferring to chair with RN assistance during my visit.  Stable exam otherwise from earlier today.    Medications        FLUoxetine  20 mg Oral QAM     gabapentin  600 mg Oral TID     levETIRAcetam  500 mg Oral BID     levothyroxine  88 mcg Oral Daily     Data[SS1.1]       Recent Labs  Lab 01/22/18  2257   WBC 9.9   HGB 15.7   HCT 48.9*   MCV 90          Recent Labs  Lab 01/23/18  0910 01/22/18  2257   NA  --  141   POTASSIUM 3.7 3.3*   CHLORIDE  --  104   CO2  --  25   ANIONGAP  --  12   GLC  --  104*   BUN  --  24   CR  --  0.80   GFRESTIMATED  --  72   GFRESTBLACK  --  87   VICKY  --  8.8   MAG  --  2.0   PROTTOTAL  --  7.3   ALBUMIN  --  3.3*   BILITOTAL  --  0.5   ALKPHOS  --  95   AST  --  18   ALT  --   16       Recent Labs  Lab 01/22/18  2257   TROPI <0.015       Recent Labs  Lab 01/23/18  0142   COLOR Yellow   APPEARANCE Clear   URINEGLC Negative   URINEBILI Negative   URINEKETONE 80*   SG 1.024   UBLD Small*   URINEPH 5.0   PROTEIN Negative   NITRITE Negative   LEUKEST Negative   RBCU 4*   WBCU 2[SS1.3]       Recent Results (from the past 24 hour(s))   Head CT w/o contrast    Narrative    CT HEAD W/O CONTRAST  1/22/2018 11:23 PM     HISTORY: AMS.    TECHNIQUE: Axial images of the head and coronal reformations without  IV contrast material. Radiation dose for this scan was reduced using  automated exposure control, adjustment of the mA and/or kV according  to patient size, or iterative reconstruction technique.    COMPARISON: 5/30/2017.    FINDINGS: Interval right frontal craniotomy with resection of the  previously seen partially calcified mass along the anterior falx which  was likely a meningioma. Just beneath the craniotomy site there is a  small amount of linear high density along the dura which is likely  postoperative change or dural calcification. Some minimal residual  subdural blood in this region is difficult to exclude but this likely  represents chronic, postoperative change. This measures up to 0.4 cm  in thickness. No associated mass effect. Otherwise no intracranial  hemorrhage. No acute infarct identified. Low attenuation areas are  present in white matter of the cerebral hemispheres that are  nonspecific but consistent with chronic small vessel ischemic changes  in this age patient.      Impression    IMPRESSION:  1. No acute findings.  2. Interval right frontal craniotomy with resection of the previously  seen mass along the anterior falx.  3. Thin linear hyperdensity beneath the craniotomy site likely  represents some dural thickening/calcification and potentially some  minimal residual subdural blood. This is likely chronic, postoperative  change. Comparison with more recent postoperative  study would be  helpful to confirm this.    Findings discussed with the ER physician at 11:40 p.m.    KACI HOLDEN MD[SS1.1]              Revision History        User Key Date/Time User Provider Type Action    > SS1.2 1/23/2018 12:56 PM Rubén Flores, DO Physician Addend     SS1.3 1/23/2018 10:45 AM Rubén Flores, DO Physician Sign     SS1.1 1/23/2018 10:34 AM Rubén Flores DO Physician             ED Provider Notes by Mark Steward MD at 1/22/2018 10:42 PM     Author:  Mark Steward MD Service:  Emergency Medicine Author Type:  Physician    Filed:  1/23/2018  4:19 AM Date of Service:  1/22/2018 10:42 PM Creation Time:  1/22/2018 10:46 PM    Status:  Signed :  Mark Steward MD (Physician)           History     Chief Complaint:[EK1.1]  Altered Mental Status[EK1.2]    History is limited due to patient's altered mental status.[EK1.3]    HPI   Carri Arias is a 67 year old female who presents to the emergency department via EMS today for evaluation of[EK1.1] an[JF1.1] altered mental status. EMS reports that the patient informed friends today that she was in [place][EK1.1] and that the f[EK1.3]riends[EK1.1], questioning the validity of this statement,[EK1.3] called 911[EK1.1] to conduct a welfare check. They report that, upon arrival, the patient's door was open and she was on the floor confused and incontinent of urine. EMS notes that she has not been compliant with her medications. They add that the patient has someone to regularly check in on her, however they are not sure of who this is or the last time a check[EK1.3]-[JF1.1]in occurred.[EK1.3] En route, the patient's blood sugar was 109.     Here in the emergency department, the[JF1.1] patient reports that she feels fine and has been compliant with her medications. She notes a history of neuropathy.[EK1.3] Patient has no complaints and wants to go home.[JM1.1] Of note, the patient had recent imaging as per below and  notes that she had recent[EK1.3] brain[JF1.1] surgery.    MR Head and Brain  INDICATION:    Meningioma. History of resection.    IMPRESSION:    1. Gross total resection of the anterior interhemispheric falcine meningioma with no evidence of recurrent/residual neoplasm.    2. Mild dural thickening overlying the right frontal lobe likely representing postoperative change.    3. Mild atrophy.    4. Mild to moderate supratentorial white matter change that is nonspecific but in a patient of this age likely due to chronic small vessel ischemic change.[EK1.3]  Reading per radiology[JF1.1]    Allergies:[EK1.1]  Penicillins  Contrast Dye[EK1.3]     Medications:[EK1.1]    Tylenol  Ketoprofen  Celebrex  Atarax  Ativan  Gabapentin  Lidoderm  Levothyroxine Sodium  Celexa[EK1.3]    Past Medical History:[EK1.1]    Arthritis  Hypercholesterolemia  Hypothyroidism  Tremors[EK1.3]    Past Surgical History:[EK1.1]    Appendectomy  Hysterectomy: total abdominal, bilateral, salpingo-oophorectomy, combined  Lumpectomy breast  Thyroidectomy  Tonsillectomy  Meningioma Resection[EK1.3]    Family History:[EK1.1]    No family history on file.[EK1.2]    Social History:  Smoking Status: Current Every Day Smoker  Smokeless Tobacco: Not on file  Alcohol Use: Positive[EK1.3]  Marital Status: Single     Review of Systems   Genitourinary:[EK1.1]        Incontinent in urine.[EK1.3]   Psychiatric/Behavioral: Positive for[EK1.1] confusion[EK1.3].[EK1.1]   All other systems reviewed and are negative[EK1.3].    Physical Exam[EK1.1]     Patient Vitals for the past 24 hrs:   BP Temp Temp src Pulse Heart Rate Resp SpO2   01/23/18 0315 116/62 - - - 46 - 98 %   01/23/18 0300 - - - - 53 - -   01/23/18 0245 147/41 - - - - - 92 %   01/23/18 0230 116/71 - - - 46 - (!) 74 %   01/23/18 0215 131/62 - - - 44 - 98 %   01/23/18 0200 125/59 - - - 48 - 100 %   01/23/18 0145 122/62 - - - 48 - 99 %   01/23/18 0130 135/65 - - - 49 - 92 %   01/23/18 0115 129/50 - - - 47 - 100  %   01/23/18 0100 124/64 - - - 56 - 99 %   01/23/18 0045 123/71 - - - 45 11 100 %   01/23/18 0030 119/43 - - - - - -   01/23/18 0015 129/68 - - - 52 15 99 %   01/23/18 0000 (!) 104/32 - - - 50 11 99 %   01/22/18 2251 140/65 99  F (37.2  C) Oral 60 - 19 -[JF1.2]      Physical Exam[EK1.1]  General:   Age appropriate, disheveled female who smells of urine.  HEENT:    Oropharynx is dry without lesions or trismus.     No scalp hematoma or defect to bony calvarium     TMs clear  Eyes:    Conjunctiva normal     PERRL; EOMs intact  Neck:    Supple, no meningismus.     CV:     Regular rate and rhythm.      No murmurs, rubs or gallops.       No JVD or unilateral leg swelling.       2+ radial pulses bilateral.       No lower extremity edema.  PULM:    Clear to auscultation bilateral.       No respiratory distress.      Good air exchange.     No rales or wheezing.     No stridor.  ABD:    Soft, non-tender, non-distended.       No pulsatile masses.       No rebound, guarding or rigidity.  MSK:     No gross deformity to all four extremities.   LYMPH:   No cervical lymphadenopathy.  NEURO:   Alert; oriented to person and place but not time.      CN II-XII intact, speech is clear with no aphasia.       Strength is 5/5 in all 4 extremities.  Sensation is intact.       Normal muscular tone, no tremor.  Skin:    Warm, dry and intact.    Psych:    Mood is good and affect is appropriate.[JM1.2]    Emergency Department Course     ECG:  ECG taken at[EK1.3] 2254[EK1.4], ECG read at[EK1.3] 2254[EK1.4]  Normal sinus rhythm  Normal ECG  Rate[EK1.3] 61[EK1.4] bpm. ID interval[EK1.3] 136[EK1.4] ms. QRS duration[EK1.3] 62[EK1.4] ms. QT/QTc[EK1.3] 410/412[EK1.4] ms. P-R-T axes[EK1.3] 55 61 75[EK1.4].    Imaging:  Radiology findings were communicated with the[EK1.3] patient[JF1.1] who voiced understanding of the findings.[EK1.3]    Head CT w/o contrast  1. No acute findings.  2. Interval right frontal craniotomy with resection of the  previously  seen mass along the anterior falx.  3. Small area of hyperdensity beneath the craniotomy site likely  represents some dural thickening/calcification and potentially some  minimal residual subdural blood. This is likely chronic, postoperative  change. Comparison with more recent postoperative study would be  helpful to confirm this.  Reading per radiology[JF1.1]    Laboratory:  Laboratory findings were communicated with the[EK1.3] patient[JF1.1] who voiced understanding of the findings.[EK1.3]    UA (Collected:[JF1.3] 0142[JF1.4]):[JF1.3] Ketones: 80 (A), Blood: Small (A), RBC/HPF: 4 (H), Mucous: Present (A)[JF1.4]  UA[JF1.5] (Collected: 0057)[JF1.3]:[JF1.5] Ketones: 80 (A), Blood: Moderate (A), Protein Albumin: 30 (A), Urobilinogen 4.0 (H), Leukocyte Esterase: Trace (A), WBC/HPF: 33 (H), RBC/HPF: 7 (H), Squamous Epithelial/HPF: 14 (H), Mucous: Present (A)[JF1.6]   Urine Drug Screen:[JF1.5] Negative[JF1.6]     Blood Gas Venous and Oxyhemoglobin (Collected: 2300): pH: 7.36, PCO2: 47, PO2: 25, Bicarbonate: 26, FlO2: Room Air, Oxyhemoglobin: 41, Base Excess: 0.2[JF1.1]    CBC: WBC[EK1.3] 9.9[JF1.1], HGB[EK1.3] 15.7[JF1.1], PLT[EK1.3] 281  C[JF1.1]MP:[EK1.3] Potassium 3.3 (L), Glucose 104 (H), Albumin 3.3 (L)[JF1.1] o/w WNL (Creatinine[EK1.3] 0.80[JF1.1])[EK1.3]  Troponin I (Collected 2257): <0.015  Magnesium: 2.0  TSH: 17.43 (H)  Ammonia: 18  Alcohol Level Serum: <0.01     Free T4: 1.05[JF1.1]  CK total: Pending[JF1.7]    Emergency Department Course:[EK1.3]    2241 Report received from EMS.[JF1.1]     224[EK1.3]2[JF1.1] I performed an exam of the patient as documented above.[EK1.3]     2257 IV was inserted and blood was drawn for laboratory testing, results above.     2316 The patient was sent for a Head CT while in the emergency department, results above.       2340 I spoke with Dr. Lance Mack of roentgenology regarding patient's presentation, findings, and plan of care.[JF1.1]      0100 The  patient provided a urine sample here in the emergency department. This was sent for laboratory testing, findings above.[JF1.5]     0316[JF1.7] I discussed the treatment plan with the patient. They expressed understanding of this plan and consented to[JF1.3] transfer to [facility][JF1.4]. I discussed the patient with [JF1.3] Amado[JF1.7] at [facility][JF1.4], who will admit the patient to a monitored bed for further evaluation and treatment.[JF1.3]     I personally reviewed the[EK1.3] laboratory, imaging, and EKG[JF1.1] results with the[EK1.3] patient[JF1.1] and answered all related questions prior to[EK1.3] transferring the patient to [facility][JF1.4].    Impression & Plan      Medical Decision Making:  Carri Arias is a 67 year old female who presents to the emergency department today for evaluation of[EK1.3] altered mental status.  Patient was disoriented and found at home on the floor unable to get up and incontinent of urine.  At time of presentation, she has no focal neurological deficit.  CT scan shows postsurgical changes consistent with recent meningioma resection without complicating factors.  There is no obvious evidence of infection, electrolyte disturbance, hypoglycemia.  No specific toxidrome noted on examination.  The cause of her symptoms/altered mental status, at this point is uncertain.  Clearly she requires further investigation and workup.  Unfortunately there are no beds available at [facility]. Patient will be transferred to [facility] for admission to medical bed.[JM1.1]    Diagnosis:[EK1.3]    ICD-10-CM    1. Altered mental status, unspecified altered mental status type R41.82 UA with Microscopic     CK total     CK total     CANCELED: CK total[JF1.2]     Disposition:[EK1.3]   The patient is transferred to[JF1.3] [facility][JF1.4] and admitted into the care of [JF1.3] Aaron Fischer[JF1.7].[JF1.3]    Scribe Disclosure:  I, Ilsa Mcclain, am serving as  a scribe at 11:19 PM on 1/22/2018 to document services personally performed by Mark Steward MD based on my observations and the provider's statements to me.[EK1.3]    [facility] EMERGENCY DEPARTMENT[EK1.1]       Mark Steward MD  01/23/18 0419  [JM1.3]     Revision History        User Key Date/Time User Provider Type Action    > JM1.3 1/23/2018  4:19 AM Mark Steward MD Physician Sign     JF1.2 1/23/2018  3:43 AM GraciaChristoph Scribe Share     JF1.7 1/23/2018  3:24 AM Gracia, Christoph Scribe      JF1.4 1/23/2018  2:45 AM Gracia, Christoph Scribe Share     JM1.1 1/23/2018  2:15 AM Mark Steward MD Physician Share     JF1.3 1/23/2018  2:05 AM Gracia, Christoph Scribe Share     JF1.6 1/23/2018  1:51 AM Gracia, Christoph Scribe Share     JF1.5 1/23/2018  1:11 AM Gracia, Christoph Scribe Share     JM1.2 1/23/2018 12:02 AM Mark Steward MD Physician Share     [N/A] 1/22/2018 11:59 PM Gracia, Christoph Scribe Share     EK1.4 1/22/2018 11:35 PM Von Mcclainyse Scribe Share     JF1.1 1/22/2018 11:35 PM Gracia Christoph Scribe      EK1.3 1/22/2018 11:28 PM Julio Cesarian Ilsa Scribe Share     EK1.2 1/22/2018 10:51 PM Julio Cesarian Ilsa Scribe Share     EK1.1 1/22/2018 10:46 PM Von Mcclainyse Scribe                   Procedure Notes     No notes of this type exist for this encounter.         Progress Notes - Therapies (Notes from 01/21/18 through 01/24/18)      Progress Notes by Evonne Ceron OT at 1/24/2018 11:35 AM     Author:  Evonne Ceron OT Service:  (none) Author Type:  Occupational Therapist    Filed:  1/24/2018 11:36 AM Date of Service:  1/24/2018 11:35 AM Creation Time:  1/24/2018 11:35 AM    Status:  Cosign Needed :  Evonne Ceron OT (Occupational Therapist)    Cosign Required:  Yes                                                                                           [facility]      OUTPATIENT OCCUPATIONAL THERAPY  EVALUATION  PLAN OF TREATMENT FOR OUTPATIENT REHABILITATION  (COMPLETE FOR  INITIAL CLAIMS ONLY)  Patient's Last Name, First Name, M.I.  YOB: 1950  Carri Arias                          Provider's Name  [facility] Medical Record No.  [id number]                               Onset Date:  01/23/18   Start of Care Date:  01/24/18     Type:     ___PT   _X_OT   ___SLP Medical Diagnosis:  confusion with fall                        OT Diagnosis:  Impaired ADLs/IADLs and functional mobility   Visits from SOC:  1   _________________________________________________________________________________  Plan of Treatment/Functional Goals    Planned Interventions:  ,       Goals: See Occupational Therapy Goals on Care Plan in [facility] electronic health record.    Therapy Frequency:    Predicted Duration of Therapy Intervention: 1x  _________________________________________________________________________________    I CERTIFY THE NEED FOR THESE SERVICES FURNISHED UNDER        THIS PLAN OF TREATMENT AND WHILE UNDER MY CARE .             Physician Signature               Date    X_____________________________________________________                      Certification date from: 01/24/18, Certification date to: 01/24/18    Referring Physician: Sumit            Initial Assessment        See Occupational Therapy evaluation dated 01/24/18 in Epic electronic health record.[AL1.1]                     Revision History        User Key Date/Time User Provider Type Action    > AL1.1 1/24/2018 11:36 AM Evonne Ceron OT Occupational Therapist Sign            Progress Notes by Evonne Ceron OT at 1/24/2018 11:22 AM     Author:  Evonne Ceron OT Service:  (none) Author Type:  Occupational Therapist    Filed:  1/24/2018 11:22 AM Date of Service:  1/24/2018 11:22 AM Creation Time:  1/24/2018 11:22 AM    Status:  Signed :  Evonne Ceron OT (Occupational Therapist)          01/24/18 1032   Quick Adds   Type of Visit Initial Occupational Therapy Evaluation  "  Living Environment   Lives With alone   Living Arrangements apartment   Living Environment Comment Pt reports living in apt, on 2nd floor with no elevator access. Pt reports no longer driving (since Oct) d/t MD telling her to stop. Pt reports having house keeper run errands. House keeper 1x every other week. Pt reports also having another provider to take her to appointments. Pt has standard toiltes with grab bars and vanity, tub/shower with shower chair and grab bars.    Self-Care   Usual Activity Tolerance moderate   Activity/Exercise/Self-Care Comment Pt reports sleeping a lot and watching TV. Pt uses [employer] in apt.    Functional Level Prior   Ambulation 1-->assistive equipment   Transferring 0-->independent   Toileting 0-->independent   Bathing 1-->assistive equipment   Dressing 0-->independent   Eating 0-->independent   Communication 0-->understands/communicates without difficulty   Swallowing 0-->swallows foods/liquids without difficulty   Cognition 1 - attention or memory deficits   Fall history within last six months yes   Number of times patient has fallen within last six months (\" I cant even count\" )   Prior Functional Level Comment Pt reports IND with dressing, toileting, bathing, making microwave meals, medication management (reports unable to complete safely), finances. Pt has A with cleaning and laundry. Pt reports multiple falls due to LE weakness and buckling. Pt reports hitting head, bruising UE and LE.    General Information   Onset of Illness/Injury or Date of Surgery - Date 01/23/18   Referring Physician Sumit   Patient/Family Goals Statement \"im not going to a crazy house\"   Additional Occupational Profile Info/Pertinent History of Current Problem Pt is a 67-year-old female with a past medical history significant for meningioma, status post resection end of 2017, hypothyroidism, dyslipidemia, peripheral neuropathy, anxiety, previous TBI and reported cognitive defects. Pt admitted after reported " confusion and fall and incontient.    Precautions/Limitations fall precautions   General Observations Pt agreeable to OT.    Cognitive Status Examination   Orientation person;place  (Month, day of week, date but not year. )   Level of Consciousness alert   Able to Follow Commands mild impairment   Personal Safety (Cognitive) decreased awareness, need for assist;decreased awareness, need for safety   Memory impaired   Attention Distractible during evaluation   Cognitive Comment Pt knows current President, reports prior President is Ignacio. Pt reports coming to hospital due to fall. Reports on phone with friend Soham who was attempting to bring over meal- reports he called Police and stated that she was confused. Presents with impaired problem solving, attention to task, following directions. .    Visual Perception   Visual Perception Wears glasses   Sensory Examination   Sensory Comments B LE numbness at baseline   Pain Assessment   Patient Currently in Pain No   Range of Motion (ROM)   ROM Quick Adds No deficits were identified   Mobility   Bed Mobility Comments MOD I with use of bd rail   Transfer Skill: Bed to Chair/Chair to Bed   Level of [place]: Bed to Chair contact guard   Physical Assist/Nonphysical Assist: Bed to Chair supervision   Assistive Device - Transfer Skill Bed to Chair Chair to Bed Rehab Eval rolling walker   Transfer Skill: Sit to Stand   Level of [place]: Sit/Stand stand-by assist   Physical Assist/Nonphysical Assist: Sit/Stand supervision   Assistive Device for Transfer: Sit/Stand rolling walker   Lower Body Dressing   Level of [place]: Dress Lower Body stand-by assist   Grooming   Level of [place]: Grooming stand-by assist   Activities of Daily Living Analysis   Impairments Contributing to Impaired Activities of Daily Living cognition impaired;balance impaired   Clinical Impression   Criteria for Skilled Therapeutic Interventions Met yes, treatment indicated   OT Diagnosis  "Impaired ADLs/IADLs and functional mobility   Influenced by the following impairments Impaired cognition/safety, impaired balance with hx of multiple falls   Assessment of Occupational Performance 3-5 Performance Deficits   Identified Performance Deficits medication management, financial management, ADLs, functional mobility   Clinical Decision Making (Complexity) Moderate complexity   Predicted Duration of Therapy Intervention (days/wks) 1x   Anticipated Discharge Disposition Transitional Care Facility   Risks and Benefits of Treatment have been explained. Yes   Patient, Family & other staff in agreement with plan of care Yes   [facility] TM \"6 Clicks\"   2016, Trustees of [facility], under license to [employer].  All rights reserved.   6 Clicks Short Forms Daily Activity Inpatient Short Form   [facility] AM-PAC  \"6 Clicks\" Daily Activity Inpatient Short Form   1. Putting on and taking off regular lower body clothing? 3 - A Little   2. Bathing (including washing, rinsing, drying)? 3 - A Little   3. Toileting, which includes using toilet, bedpan or urinal? 3 - A Little   4. Putting on and taking off regular upper body clothing? 4 - None   5. Taking care of personal grooming such as brushing teeth? 3 - A Little   6. Eating meals? 4 - None   Daily Activity Raw Score (Score out of 24.Lower scores equate to lower levels of function) 20   Total Evaluation Time   Total Evaluation Time (Minutes) 15[AL1.1]        Revision History        User Key Date/Time User Provider Type Action    > AL1.1 1/24/2018 11:22 AM Evonne Ceron OT Occupational Therapist Sign                                                      INTERAGENCY TRANSFER FORM - LAB / IMAGING / EKG / EMG RESULTS   1/23/2018                       [place] [address] [facility]: [phone]            Unresulted Labs (24h ago through future)    Start       Ordered    Unscheduled  Potassium  (Potassium Replacement - " "\"Standard\" - For K levels less than 3.4 mmol/L - UU,UR,UA,RH,SH,PH,WY )  CONDITIONAL (SPECIFY),   Routine     Comments:  Obtain Potassium Level for these conditions:  *IF no potassium result within 24 hours before initiation of order set, draw potassium level with next lab collect.    *2 HOURS AFTER last IV potassium replacement dose and 4 hours after an oral replacement dose.  *Next morning after potassium dose.     Repeat Potassium Replacement if necessary.    01/23/18 0610         Lab Results - 3 Days      Keppra (Levetiracetam) Level [[id number]] (Abnormal)  Resulted: 01/24/18 1518, Result status: Final result    Ordering provider: Rubén Flores,   01/23/18 1026 Resulting lab: [facility]    Specimen Information    Type Source Collected On   Blood  01/23/18 0910          Components       Value Reference Range Flag Lab   Keppra (Levetiracetam) Level <2 12 - 46 ug/mL L FrStHsLb   Comment:         (Note)  INTERPRETIVE INFORMATION: Keppra (Levetiracetam)  Therapeutic Range:  12-46 ug/mL             Toxic:  Not well Established  Pharmacokinetics of levetiracetam are affected by renal   function. Adverse effects may include somnolence, weakness,   headache and vomiting.  Performed by [facility],  [address] [place] [id number] [phone]  [email or web address], Tim Cook MD, Lab. Director              Folate [[id number]]  Resulted: 01/23/18 1513, Result status: Final result    Ordering provider: Rubén Flores,   01/23/18 1036 Resulting lab: [facility]    Specimen Information    Type Source Collected On   Blood  01/23/18 0910          Components       Value Reference Range Flag Lab   Folate 16.2 >5.4 ng/mL  51            Vitamin B12 [[id number]] (Abnormal)  Resulted: 01/23/18 1437, Result status: Final result    Ordering provider: Rubén Flores,   01/23/18 1036 Resulting lab: [facility]    Specimen Information    Type " Source Collected On   Blood  01/23/18 0910          Components       Value Reference Range Flag Lab   Vitamin B12 1704 193 - 986 pg/mL H 51            Potassium [[id number]]  Resulted: 01/23/18 0937, Result status: Final result    Ordering provider: Bassam Arana DO  01/23/18 0853 Resulting lab: [facility]    Specimen Information    Type Source Collected On   Blood  01/23/18 0910          Components       Value Reference Range Flag Lab   Potassium 3.7 3.4 - 5.3 mmol/L  FrStHsLb            CK total [[id number]]  Resulted: 01/23/18 0406, Result status: Final result    Ordering provider: Mark Steward MD  01/22/18 2257 Resulting lab: [facility]    Specimen Information    Type Source Collected On     01/22/18 2257          Components       Value Reference Range Flag Lab   CK Total 192 30 - 225 U/L  FrRdHs            UA with Microscopic [[id number]] (Abnormal)  Resulted: 01/23/18 0206, Result status: Final result    Ordering provider: Mark Steward MD  01/23/18 0146 Resulting lab: [facility]    Specimen Information    Type Source Collected On   Catheterized Urine Urine catheter 01/23/18 0142          Components       Value Reference Range Flag Lab   Color Urine Yellow   FrRdHs   Appearance Urine Clear   FrRdHs   Glucose Urine Negative NEG^Negative mg/dL  FrRdHs   Bilirubin Urine Negative NEG^Negative  FrRdHs   Ketones Urine 80 NEG^Negative mg/dL A FrRdHs   Specific Gravity Urine 1.024 1.003 - 1.035  FrRdHs   Blood Urine Small NEG^Negative A FrRdHs   pH Urine 5.0 5.0 - 7.0 pH  FrRdHs   Protein Albumin Urine Negative NEG^Negative mg/dL  FrRdHs   Urobilinogen mg/dL 2.0 0.0 - 2.0 mg/dL  FrRdHs   Nitrite Urine Negative NEG^Negative  FrRdHs   Leukocyte Esterase Urine Negative NEG^Negative  FrRdHs   Source Catheterized Urine   FrRdHs   WBC Urine 2 0 - 2 /HPF  FrRdHs   RBC Urine 4 0 - 2 /HPF H FrRdHs   Mucous Urine Present NEG^Negative /LPF A FrRdHs             Drug abuse screen 77 urine [[id number]]  Resulted: 01/23/18 0124, Result status: Final result    Ordering provider: Mark Steward MD  01/22/18 2252 Resulting lab: [facility]    Specimen Information    Type Source Collected On   Urine Urine clean catch 01/23/18 0057          Components       Value Reference Range Flag Lab   Amphetamine Qual Urine Negative NEG^Negative  FrRdHs   Comment:  Cutoff for a negative amphetamine is 500 ng/mL or less.   Barbiturates Qual Urine Negative NEG^Negative  FrRdHs   Comment:  Cutoff for a negative barbiturate is 200 ng/mL or less.   Benzodiazepine Qual Urine Negative NEG^Negative  FrRdHs   Comment:  Cutoff for a negative benzodiazepine is 200 ng/mL or less.   Cannabinoids Qual Urine Negative NEG^Negative  FrRdHs   Comment:  Cutoff for a negative cannabinoid is 50 ng/mL or less.   Cocaine Qual Urine Negative NEG^Negative  FrRdHs   Comment:  Cutoff for a negative cocaine is 300 ng/mL or less.   Opiates Qualitative Urine Negative NEG^Negative  FrRdHs   Comment:  Cutoff for a negative opiate is 300 ng/mL or less.   PCP Qual Urine Negative NEG^Negative  FrRdHs   Comment:  Cutoff for a negative PCP is 25 ng/mL or less.            UA with Microscopic [[id number]] (Abnormal)  Resulted: 01/23/18 0117, Result status: Final result    Ordering provider: Mark Steward MD  01/22/18 2252 Resulting lab: [facility]    Specimen Information    Type Source Collected On   Midstream Urine Urine clean catch 01/23/18 0057          Components       Value Reference Range Flag Lab   Color Urine Yellow   FrRdHs   Appearance Urine Cloudy   FrRdHs   Glucose Urine Negative NEG^Negative mg/dL  FrRdHs   Bilirubin Urine Negative NEG^Negative  FrRdHs   Ketones Urine 80 NEG^Negative mg/dL A FrRdHs   Specific Gravity Urine 1.027 1.003 - 1.035  FrRdHs   Blood Urine Moderate NEG^Negative A FrRdHs   pH Urine 5.0 5.0 - 7.0 pH  FrRdHs   Protein Albumin Urine 30 NEG^Negative mg/dL A  FrRdHs   Urobilinogen mg/dL 4.0 0.0 - 2.0 mg/dL H FrRdHs   Nitrite Urine Negative NEG^Negative  FrRdHs   Leukocyte Esterase Urine Trace NEG^Negative A FrRdHs   Source Midstream Urine   FrRdHs   WBC Urine 33 0 - 2 /HPF H FrRdHs   RBC Urine 7 0 - 2 /HPF H FrRdHs   Squamous Epithelial /HPF Urine 14 0 - 1 /HPF H FrRdHs   Mucous Urine Present NEG^Negative /LPF A FrRdHs            T4 free [[id number]]  Resulted: 01/22/18 2349, Result status: Final result    Ordering provider: Mark Steward MD  01/22/18 2257 Resulting lab: [facility]    Specimen Information    Type Source Collected On     01/22/18 2257          Components       Value Reference Range Flag Lab   T4 Free 1.05 0.76 - 1.46 ng/dL  FrRdHs            TSH with free T4 reflex [[id number]] (Abnormal)  Resulted: 01/22/18 2336, Result status: Final result    Ordering provider: Mark Steward MD  01/22/18 2252 Resulting lab: [facility]    Specimen Information    Type Source Collected On   Blood  01/22/18 2257          Components       Value Reference Range Flag Lab   TSH 17.43 0.40 - 4.00 mU/L H FrRdHs            Comprehensive metabolic panel [[id number]] (Abnormal)  Resulted: 01/22/18 2332, Result status: Final result    Ordering provider: Mark Steward MD  01/22/18 2252 Resulting lab: [facility]    Specimen Information    Type Source Collected On   Blood  01/22/18 2257          Components       Value Reference Range Flag Lab   Sodium 141 133 - 144 mmol/L  FrRdHs   Potassium 3.3 3.4 - 5.3 mmol/L L FrRdHs   Chloride 104 94 - 109 mmol/L  FrRdHs   Carbon Dioxide 25 20 - 32 mmol/L  FrRdHs   Anion Gap 12 3 - 14 mmol/L  FrRdHs   Glucose 104 70 - 99 mg/dL H FrRdHs   Urea Nitrogen 24 7 - 30 mg/dL  FrRdHs   Creatinine 0.80 0.52 - 1.04 mg/dL  FrRdHs   GFR Estimate 72 >60 mL/min/1.7m2  FrRdHs   Comment:  Non  GFR Calc   GFR Estimate If Black 87 >60 mL/min/1.7m2  FrRdHs   Comment:   GFR  Calc   Calcium 8.8 8.5 - 10.1 mg/dL  FrRdHs   Bilirubin Total 0.5 0.2 - 1.3 mg/dL  FrRdHs   Albumin 3.3 3.4 - 5.0 g/dL L FrRdHs   Protein Total 7.3 6.8 - 8.8 g/dL  FrRdHs   Alkaline Phosphatase 95 40 - 150 U/L  FrRdHs   ALT 16 0 - 50 U/L  FrRdHs   AST 18 0 - 45 U/L  FrRdHs            Troponin I [[id number]]  Resulted: 01/22/18 2332, Result status: Final result    Ordering provider: Mark Steward MD  01/22/18 2252 Resulting lab: [facility]    Specimen Information    Type Source Collected On   Blood  01/22/18 2257          Components       Value Reference Range Flag Lab   Troponin I ES <0.015 0.000 - 0.045 ug/L  FrRd   Comment:         The 99th percentile for upper reference range is 0.045 ug/L.  Troponin values   in the range of 0.045 - 0.120 ug/L may be associated with risks of adverse   clinical events.              Magnesium [[id number]]  Resulted: 01/22/18 2332, Result status: Final result    Ordering provider: Mark Steward MD  01/22/18 2252 Resulting lab: [facility]    Specimen Information    Type Source Collected On   Blood  01/22/18 2257          Components       Value Reference Range Flag Lab   Magnesium 2.0 1.6 - 2.3 mg/dL  FrRdHs            Alcohol level blood [[id number]]  Resulted: 01/22/18 2332, Result status: Final result    Ordering provider: Mark Steward MD  01/22/18 2252 Resulting lab: [facility]    Specimen Information    Type Source Collected On   Blood  01/22/18 2257          Components       Value Reference Range Flag Lab   Ethanol g/dL <0.01 <0.01 g/dL  FrRdHs            Ammonia (on ice) [[id number]]  Resulted: 01/22/18 2324, Result status: Final result    Ordering provider: Mark Steward MD  01/22/18 2252 Resulting lab: [facility]    Specimen Information    Type Source Collected On   Blood  01/22/18 2257          Components       Value Reference Range Flag Lab   Ammonia 18 10 - 50 umol/L  FrRdHs             Blood gas venous and oxyhgb [[id number]]  Resulted: 01/22/18 2312, Result status: Final result    Ordering provider: Mark Steward MD  01/22/18 2252 Resulting lab: [facility]    Specimen Information    Type Source Collected On   Blood  01/22/18 2300          Components       Value Reference Range Flag Lab   Ph Venous 7.36 7.32 - 7.43 pH  FrRdHs   PCO2 Venous 47 40 - 50 mm Hg  FrRdHs   PO2 Venous 25 25 - 47 mm Hg  FrRdHs   Bicarbonate Venous 26 21 - 28 mmol/L  FrRdHs   FIO2 Room Air   FrRdHs   Oxyhemoglobin Venous 41 %  FrRdHs   Base Excess Venous 0.2 mmol/L  FrRdHs   Comment:  Reference range:  -7.7 to 1.9            CBC + differential [[id number]] (Abnormal)  Resulted: 01/22/18 2310, Result status: Final result    Ordering provider: Mark Steward MD  01/22/18 2252 Resulting lab: [facility]    Specimen Information    Type Source Collected On   Blood  01/22/18 2257          Components       Value Reference Range Flag Lab   WBC 9.9 4.0 - 11.0 10e9/L  FrRdHs   RBC Count 5.42 3.8 - 5.2 10e12/L H FrRdHs   Hemoglobin 15.7 11.7 - 15.7 g/dL  FrRdHs   Hematocrit 48.9 35.0 - 47.0 % H FrRdHs   MCV 90 78 - 100 fl  FrRdHs   MCH 29.0 26.5 - 33.0 pg  FrRdHs   MCHC 32.1 31.5 - 36.5 g/dL  FrRdHs   RDW 13.4 10.0 - 15.0 %  FrRdHs   Platelet Count 281 150 - 450 10e9/L  FrRdHs   Diff Method Automated Method   FrRdHs   % Neutrophils 56.0 %  FrRdHs   % Lymphocytes 36.4 %  FrRdHs   % Monocytes 6.4 %  FrRdHs   % Eosinophils 0.4 %  FrRdHs   % Basophils 0.3 %  FrRdHs   % Immature Granulocytes 0.5 %  FrRdHs   Nucleated RBCs 0 0 /100  FrRdHs   Absolute Neutrophil 5.5 1.6 - 8.3 10e9/L  FrRdHs   Absolute Lymphocytes 3.6 0.8 - 5.3 10e9/L  FrRdHs   Absolute Monocytes 0.6 0.0 - 1.3 10e9/L  FrRdHs   Absolute Eosinophils 0.0 0.0 - 0.7 10e9/L  FrRdHs   Absolute Basophils 0.0 0.0 - 0.2 10e9/L  FrRdHs   Abs Immature Granulocytes 0.1 0 - 0.4 10e9/L  FrRdHs   Absolute Nucleated RBC 0.0   FrRdHs            Testing  Performed By     Lab - Abbreviation Name Director Address Valid Date Range    12 - [facility] Unknown [address] [place] [address] 05/08/15 1057 - Present    [facility] Unknown [address] [place] [address] 05/08/15 1057 - Present    51 - Unknown [facility] Unknown [address] [facility] [address] 12/31/14 1010 - Present               Imaging Results - 3 Days      Head CT w/o contrast [[id number]]  Resulted: 01/23/18 0026, Result status: Final result    Ordering provider: Mark Steward MD  01/22/18 2252 Resulted by: Lance Mack MD    Performed: 01/22/18 2316 - 01/22/18 2323 Resulting lab: RADIOLOGY RESULTS    Narrative:       CT HEAD W/O CONTRAST  1/22/2018 11:23 PM     HISTORY: AMS.    TECHNIQUE: Axial images of the head and coronal reformations without  IV contrast material. Radiation dose for this scan was reduced using  automated exposure control, adjustment of the mA and/or kV according  to patient size, or iterative reconstruction technique.    COMPARISON: 5/30/2017.    FINDINGS: Interval right frontal craniotomy with resection of the  previously seen partially calcified mass along the anterior falx which  was likely a meningioma. Just beneath the craniotomy site there is a  small amount of linear high density along the dura which is likely  postoperative change or dural calcification. Some minimal residual  subdural blood in this region is difficult to exclude but this likely  represents chronic, postoperative change. This measures up to 0.4 cm  in thickness. No associated mass effect. Otherwise no intracranial  hemorrhage. No acute infarct identified. Low attenuation areas are  present in white matter of the cerebral hemispheres that are  nonspecific but consistent with chronic small vessel ischemic changes  in this age patient.      Impression:       IMPRESSION:  1. No acute  findings.  2. Interval right frontal craniotomy with resection of the previously  seen mass along the anterior falx.  3. Thin linear hyperdensity beneath the craniotomy site likely  represents some dural thickening/calcification and potentially some  minimal residual subdural blood. This is likely chronic, postoperative  change. Comparison with more recent postoperative study would be  helpful to confirm this.    Findings discussed with the ER physician at 11:40 p.m.    KACI HOLDEN MD      Testing Performed By     Lab - Abbreviation Name Director Address Valid Date Range    104 - [facility] RADIOLOGY RESULTS Unknown Unknown 02/16/05 1553 - Present            Encounter-Level Documents:     There are no encounter-level documents.      Order-Level Documents:     There are no order-level documents.

## 2018-01-23 NOTE — IP AVS SNAPSHOT
` `     [facility] MEDICAL SPECIALTY UNIT: [phone]                 INTERAGENCY TRANSFER FORM - NOTES (H&P, Discharge Summary, Consults, Procedures, Therapies)   2018                    Hospital Admission Date: 2018  RUTHANN ARIAS   : 1950  Sex: Female        Patient PCP Information     Provider PCP Type    [facility]         History & Physicals      H&P signed by Bassam Arana DO at 2018  9:02 PM      Author:  Bassam Arana DO Service:  Hospitalist Author Type:  Physician    Filed:  2018  9:02 PM Date of Service:  2018  6:26 AM Creation Time:  2018  7:37 AM    Status:  Signed :  Bassam Arana DO (Physician)         Admitted:     2018      MEDICINE ADMISSION:        DATE OF ADMISSION:  2018      PRIMARY CARE PHYSICIAN:  [place] [facility]      CODE STATUS:  DNR/DNI.      CHIEF COMPLAINT:  Concern for confusion.      HISTORY OF PRESENT ILLNESS:  Ms. Arias is a 67-year-old female with a past medical history significant for meningioma, status post resection end of , hypothyroidism, dyslipidemia, peripheral neuropathy, anxiety, previous TBI and reported cognitive defects, who presents to the Emergency Department at [facility] when a friend that she was confused.  History is obtained through discussion with the patient and chart review.  Per report, the patient was called by a friend yesterday who was concerned when the patient was making statements about being in [place] when she was clearly in [place].  The friend called EMS to have a welfare check on the patient.  When EMS arrived to the patient's home, they found her sitting on the floor and smelling of urine.  They brought her into the Emergency Department for further evaluation.        When I saw the patient here on the 6th floor at [facility], the patient could tell me what happened and that the police officers came into her  house and asked her why she was sitting on the floor, to which she told them that she simply sat down there.  She however notes that she cannot recall how she got to be in [place].  The patient has not had any other recent symptoms leading up to today.  She is a bit confused on timelines, as she said she had her surgery about 6 weeks ago though it was roughly 4 months ago.  She however does remember going to a rehab center near [facility].  She denies any chest discomfort, shortness of breath, fevers or chills, cough, abdominal discomfort, nausea or diarrhea.  She does have some back discomfort which she states is chronic.  There is no recent exacerbation of this.      While she was in the ED at [facility], she had a workup which was unremarkable.  Head CT showed chronic postoperative changes but nothing acute.  UA was bland.  Drug of abuse screen, ethanol level were negative.  CBC, BMP and LFTs also all unremarkable, save for a mildly low potassium at 3.3.  TSH was elevated to 17.4.  ABG was negative.  The hospital at [facility] had no beds and so she was sent to [facility] for further evaluation.      I did review the records from her hospitalization at [facility] for her meningioma resection.  It sounds like she has had a meningioma diagnosed since 2012, but it had been growing.  When she initially [place] [facility] [place] a fall she had a subarachnoid hemorrhage.  She was scheduled for surgery late September but fell on the day of her surgery and again, had a subarachnoid hemorrhage, though they went ahead with evacuation of this and the meningioma resection.  Postoperatively, she had some issues with cognitive deficits, hallucinations potentially and was ultimately discharged to rehab.  There is cognitive deficits listed as one of her medical issues, though it is unclear exactly how severe these might be.  The patient has been getting home care per all the documentation in Care  Everywhere, though we cannot see any actual records of the home care visits.      PAST MEDICAL HISTORY:   1.  Hypothyroidism:  The patient has previously had a thyroid resection due to multinodular goiter.   2.  Dyslipidemia.   3.  Sacral fracture.   4.  Bradycardia.   5.  Meningioma, status post resection in 09/2017.   6.  Subarachnoid hemorrhage following a fall in 03/2017 and again in 09/2017.   7.  Peripheral neuropathy.   8.  Anxiety and depression.   9.  Insomnia.   10.  Osteopenia.   11.  Traumatic brain injury.      MEDICATIONS:    Prior to Admission medications    Medication Sig Last Dose Taking? Auth Provider   gabapentin (NEURONTIN) 300 MG capsule Take 600 mg by mouth 3 times daily 1/19/2018 Yes Unknown, Entered By History   simvastatin (ZOCOR) 20 MG tablet Take 20 mg by mouth At Bedtime 1/19/2018 Yes Unknown, Entered By History   levothyroxine (SYNTHROID/LEVOTHROID) 88 MCG tablet Take 88 mcg by mouth daily 1/19/2018 Yes Unknown, Entered By History   Calcium Carb-Cholecalciferol (CALCIUM-VITAMIN D) 600-400 MG-UNIT TABS Take 1 tablet by mouth 2 times daily 1/19/2018 Yes Unknown, Entered By History   FLUoxetine 20 MG tablet Take 20 mg by mouth every morning 1/19/2018 Yes Unknown, Entered By History   levETIRAcetam (KEPPRA) 500 MG tablet Take 500 mg by mouth 2 times daily Few weeks ago at Unknown time  Unknown, Entered By History           SOCIAL HISTORY:  The patient denies any use of tobacco or alcohol.      FAMILY HISTORY:  Mother had lung cancer.      ALLERGIES:  PENICILLIN AND CONTRAST DYE.      REVIEW OF SYSTEMS:  The complete review of systems reviewed and negative, save for the pertinent positives recorded in the HPI.      PHYSICAL EXAMINATION:   VITAL SIGNS:  Show a blood pressure of 131/65, heart rate of 60, respirations 18, satting 94% on room air with a temperature of 98.8 degrees Fahrenheit.   GENERAL:  The patient is lying in bed, smelling of urine but appears comfortable.   HEENT:  Pupils  equal, round, reactive to light, extraocular muscle function intact.  No scleral icterus.  Oropharynx is clear.   NECK:  No lymphadenopathy or thyromegaly.   CARDIOVASCULAR:  Heart is regular rate and rhythm without any murmur, rub or gallop.   PULMONARY:  Lungs are clear to auscultation bilaterally without wheeze or rhonchi.   GASTROINTESTINAL:  Positive bowel sounds, soft, nontender and nondistended.   SKIN:  No rash or lesion.   LYMPHATICS:  No peripheral edema.   PSYCHIATRIC:  The patient is alert and oriented x 3.  She was slightly off in the year, 2016, but otherwise got everything else right, in terms of date.   NEUROLOGIC:  Cranial nerves II-XII are grossly intact.  She has good muscle strength bilaterally.  No focal deficits noted.  Good coordination.      LABORATORY DATA:  CBC, BMP, LFTs unremarkable, save for a potassium of 3.3.  ABG unremarkable.  TSH is 17.43 with a free T4 of 1.05.  UA has 2 WBCs, 4 RBCs, small blood, but negative nitrites and leuk/esterase.      IMAGING:  Head CT shows chronic postoperative change with no acute findings.      ASSESSMENT AND PLAN:  Ms. Arias is a female with a past medical history significant for meningioma resection in 09/2017, hypothyroidism, dyslipidemia, cognitive deficits, subarachnoid hemorrhage and peripheral neuropathy, who presents to the Emergency Department at [facility] with concerns for confusion.   1.  Mild confusion with a history of cognitive deficits:  It is difficult for me to assess at this point how confused the patient is, as she seems to be doing fairly well and answering questions appropriately.  She does not have any signs of infection, no new acute issues, per her report and no neuro deficits.  I note she is on Keppra following her surgery and unclear if she is still on this but does not seem postictal and has not had any reported seizure activity.  Synthroid could be adjusted, but it is certainly not low enough that this though would  be causing significant confusion.  Ativan and hydroxyzine certainly could be causing some issues if she is taking these though again, this is unclear.  We will have OT assess her for cognitive deficits as well as PT to ensure she is safe to go home or any other additional resources that may be needed.  Social work to consult and help with disposition plan.  Could always consider a neurology or psych consult, but I do not know how much utility that would be at this point.   2.  Status post meningioma resection:  Head CT shows what appears to be chronic  postoperative change.  Could always consider a neurosurgery consult to look at the images, but the report does not look that impressive.   3.  Hypothyroidism:  TSH is elevated to 17.  Once we know her dose, I will increase this slightly and have her recheck with her PCP in 3-4 weeks.   4.  Mild hypokalemia:  Replace per protocol.   5.  Depression with anxiety:  Continue with SSRI, once confirmed.   6.  Back pain:  This is chronic.  I will have her get up and walking with nursing and therapies.  If she has a significantly worse pain, could consider spine imaging.   7.  Bradycardia:  Monitor for any hypotension or symptoms of bradycardia.   8.  Peripheral neuropathy.   9.  Disposition:  She will be under observation status for now, as I do not really see anything acute at this point that would make her inpatient.         CHANDRA PRETTY DO             D: 2018   T: 2018   MT: MEE      Name:     RUTHANN FRANCIS   MRN:      [id number]        Account:      [id number]   :      1950        Admitted:     2018                   Document: [id number][AM1.1]         Revision History        User Key Date/Time User Provider Type Action    > AM1.1 2018  9:02 PM Chandra Pretty DO Physician Sign     [N/A] 2018  7:37 AM Chandra Pretty DO Physician Edit                  Discharge Summaries     No notes of this type exist for this  encounter.         Consult Notes      Consults by Coco Jacob APRN CNP at 1/23/2018 11:33 AM     Author:  Coco Jacob APRN CNP Service:  Neurology Author Type:  Nurse Practitioner    Filed:  1/23/2018 12:00 PM Date of Service:  1/23/2018 11:33 AM Creation Time:  1/23/2018 10:55 AM    Status:  Signed :  Coco Jacbo APRN CNP (Nurse Practitioner)     Consult Orders:    1. Neurology IP Consult: General (non-stroke/non-ICU); Patient to be seen: Routine - within 24 hours; Complex neuro/neurosurg hx past year with increasing confusion over past few weeks.; Consultant may enter orders: Yes [[id number]] ordered by Rubén Flores DO at 01/23/18 1034                [facility]    Neurology Consultation Note     Carri Arias MRN# [id number]   YOB: 1950 Age: 67 year old    Code Status:DNR/DNI   Date of Admission: 1/23/2018  Date of Consult: 01/23/2018    _________________________________   Primary Care Physician   [facility]    Reason for consult: I was asked by Dr. Flores to evaluate this patient for confusion        ______________________________________________         Assessment & Plan   ______________________________________________  #.[MC1.1] (R41.0) Confusion  (primary encounter diagnosis)[MC1.2]  --likely multifactorial  --increasing over preceding weeks  --significant elevation in TSH, reported cognitive deficits following meningioma resection  --placed on keppra s/p meningioma resection  -----keppra level pending[MC1.1]  --patient reports she often forgets to take her medications, needs assistance with medication management at home  ------may benefit from rehab stay instead of direct return to home  --will not get MRI brain at this time due to patient with minimal confusion and significant elevation in TSH, never returned to baseline after meningioma resection[MC1.3]  #.[MC1.1] (Z98.230) History of resection of  meningioma[MC1.2]  --resection at [facility][MC1.1] October[MC1.3] 2017  --CT with postoperative changes, no acute abnormalities  #.[MC1.1] (Z86.79) History of subarachnoid hemorrhage[MC1.2]  --March 2017 related to a fall  #.[MC1.1] (G62.9) Peripheral polyneuropathy[MC1.2]  --on gabapentin  mg TID  #.[MC1.1] (E03.9) Acquired hypothyroidism[MC1.2]  --significant elevation in TSH  ----hypothyroidism can cause confusion and memory loss  #. DVT Prophylaxis  --per primary service  #. PT/OT/Speech  --Start evaluations  #. Nutrition / GI Prophylaxis  --Per recommendations of speech therapy      #. Code Status: DNR / DNI      Chief Complaint   ______________________________________________  Confusion   History is obtained from the patient and electronic health record    History of Present Illness   ______________________________________________  Carri Arias is a 67 year old female who presented with confusion. History of meningioma, resected in[MC1.1] October[MC1.3] 2017 at [facility], previous TBI and reported cognitive defects. She had a fall in March 2013 with subarachnoid hemorrhage.  She was placed on keppra following meningioma resection. Friend was concerned the day prior to admission due to finding the patient confused on the phone. EMS was called for a welfare check. EMS found her on the floor smelling of urine and she was brought to the [facility] ED for evaluation. CT showed postoperative changes but no acute abnormalities. There were no available beds, so patient was transferred to [facility] for monitoring and further evaluation. Sister who lives in [place] reports increasing confusion noted in the past few weeks over the phone. She has had falls at home recently and family is concerned over her safety.    On exam,[MC1.1] patient is mildly confused. She provides the incorrect hospital name, but knows she is in a hospital, and is off by one day on orientation. She is  able to answer current events and historical recall questions and abstract thinking is intact. No focal weakness, sensation intact other than baseline neuropathy in feet. Reports falls a couple times per month, once with injury to her back. She reports she often forgets to take her medications. Memory is not quite back to what it used to be prior to surgery. She reports she was to get a machine to help her remember to take her medications, but she has not received it.[MC1.3]     Past Medical History    ______________________________________________  Past Medical History:   Diagnosis Date     Arthritis     knees and hips     Hypercholesterolemia      Hypothyroidism      Tremors     from thyroid?     Past Surgical History   ______________________________________________  Past Surgical History:   Procedure Laterality Date     APPENDECTOMY       HYSTERECTOMY TOTAL ABDOMINAL, BILATERAL SALPINGO-OOPHORECTOMY, COMBINED       LUMPECTOMY BREAST       THYROIDECTOMY  8/31/2012    Procedure: THYROIDECTOMY;  Total Thyroidectomy;  Surgeon: Melany Arellano MD;  Location: [facility] OR     TONSILLECTOMY       Prior to Admission Medications   ______________________________________________  Prior to Admission Medications   Prescriptions Last Dose Informant Patient Reported? Taking?   Calcium Carb-Cholecalciferol (CALCIUM-VITAMIN D) 600-400 MG-UNIT TABS 1/19/2018 Other Yes Yes   Sig: Take 1 tablet by mouth 2 times daily   FLUoxetine 20 MG tablet 1/19/2018 Other Yes Yes   Sig: Take 20 mg by mouth every morning   gabapentin (NEURONTIN) 300 MG capsule 1/19/2018 Other Yes Yes   Sig: Take 600 mg by mouth 3 times daily   levETIRAcetam (KEPPRA) 500 MG tablet Few weeks ago at Unknown time Other Yes No   Sig: Take 500 mg by mouth 2 times daily   levothyroxine (SYNTHROID/LEVOTHROID) 88 MCG tablet 1/19/2018 Other Yes Yes   Sig: Take 88 mcg by mouth daily   simvastatin (ZOCOR) 20 MG tablet 1/19/2018 Other Yes Yes   Sig: Take 20 mg by mouth At Bedtime       Facility-Administered Medications: None     Allergies   Allergies   Allergen Reactions     Penicillins Shortness Of Breath and Hives     Chest heaviness     Contrast Dye Hives       Social History   ______________________________________________  Social History     Social History     Marital status: Single     Spouse name: N/A     Number of children: N/A     Years of education: N/A     Social History Main Topics     Smoking status: Current Every Day Smoker     Smokeless tobacco: Not on file      Comment: 4-5 daily     Alcohol use Yes      Comment: rarely     Drug use: No     Sexual activity: Not on file     Other Topics Concern     Not on file     Social History Narrative     No narrative on file       Family History   ______________________________________________  Reviewed and not felt to be contributory.     Review of Systems   ______________________________________________  A comprehensive review of  10 systems was performed and found to be negative except as described in this note  CONSTITUTIONAL: negative for fever, chills, change in weight  INTEGUMENTARY/SKIN: no rash or obvious new lesions  ENT/MOUTH: no sore throat, new sinus pain or nasal drainage, no neck mass noted  RESP: No pleuretic pain, No cough, no hemoptysis, No SOB   CV: negative for chest pain, palpitations or peripheral edema  GI: no nausea, vomiting, change in stools  : no dysuria or hematuria  MUSCULOSKELETAL: no myalgias, arthralgias or join efffusion  ENDOCRINE: no history of polyuria, polydyspsia or symptoms of thyroid dysfunction  PSYCHIATRIC: no change in mood stable  LYMPHATIC: no new lymphadenopathy  HEME: no bleeding or easy bruisability  NEURO: see HPI    Physical Exam   ______________________________________________  Weight:128 lbs 15.51 oz; Height:Data Unavailable  Temp: 98  F (36.7  C) Temp src: Oral BP: 122/67 Pulse: 57 Heart Rate: 49 Resp: 18 SpO2: 98 % O2 Device: None (Room air)    General Appearance:  No acute  distress[MC1.1]  Neuro:       Mental Status Exam:   Awake, alert, oriented X2. Intermittent mild slurred speech, no clear aphasia. Oriented to current/historical events and recall, abstract thinking intact. Mental status is mildly confused       Cranial Nerves:  Pupils 3 mm, reactive. EOMI. Face sensation is normal. Face is symmetric. Tongue and uvula are midline. Other CN are normal           Motor:  Generalized weakness, symmetric. Tone and bulk are normal           Reflexes:  Normal DTR. Toes downgoing.        Sensory:  Normal to light touch               Coordination:   Intact finger-to-nose        Gait:  Up with assistance[MC1.3]  Neck: no nuchal rigidity, normal thyroid. No carotid bruits.    Cardiovascular: Regular rate and rhythm, no m/r/g  Lungs: Clear to auscultation  Abdomen: Soft, not tender, not distended  Extremities: No clubbing, no cyanosis, no edema    Data   ______________________________________________  All Data personally reviewed:       Labs:   CBC RESULTS:     Recent Labs  Lab 01/22/18 2257   WBC 9.9   RBC 5.42*   HGB 15.7   HCT 48.9*        Basic Metabolic Panel:   Recent Labs   Lab Test  01/23/18   0910  01/22/18 2257 04/17/14   0745  04/12/14   0630   NA   --   141  140  143   POTASSIUM  3.7  3.3*  4.2  4.1   CHLORIDE   --   104  105  108   CO2   --   25  30  29   BUN   --   24  13  13   CR   --   0.80  0.61  0.70   GLC   --   104*  94  88   VICKY   --   8.8  8.2*  8.3*     Liver panel:  Recent Labs   Lab Test  01/22/18 2257   PROTTOTAL  7.3   ALBUMIN  3.3*   BILITOTAL  0.5   ALKPHOS  95   AST  18   ALT  16     Troponin I:   Recent Labs   Lab Test  01/22/18 2257   TROPI  <0.015     Ammonia:   Recent Labs   Lab Test  01/22/18 2257   FLASH  18     CK:   Recent Labs   Lab Test  01/22/18 2257   CKT  192          Drug Screen: Recent Labs   Lab Test  01/23/18   0057   UAMP  Negative   UBARB  Negative   BENZODIAZEUR  Negative   UCANN  Negative   UCOC  Negative   OPIT  Negative    UPCP  Negative     Alcohol level:  Recent Labs   Lab Test  01/22/18   2257   ETOH  <0.01     UA Results:  Recent Labs   Lab Test  01/23/18   0142   COLOR  Yellow   APPEARANCE  Clear   URINEGLC  Negative   URINEBILI  Negative   URINEKETONE  80*   SG  1.024   UBLD  Small*   URINEPH  5.0   PROTEIN  Negative   NITRITE  Negative   LEUKEST  Negative   RBCU  4*   WBCU  2     Most Recent 6 Bacteria Isolates From Any Culture (See EPIC Reports for Culture Details):  Recent Labs   Lab Test  04/08/14   1328   CULT  >100,000 colonies/mL Escherichia coli*        Cardiac US:[MC1.1]   --[MC1.3]       Neurophysiology:[MC1.1]   --[MC1.3]       Imaging:   All imaging studies were reviewed personally  CT head 1/22/18:   1. No acute findings.  2. Interval right frontal craniotomy with resection of the previously seen mass along the anterior falx.  3. Thin linear hyperdensity beneath the craniotomy site likely represents some dural thickening/calcification and potentially some minimal residual subdural blood. This is likely chronic, postoperative change. Comparison with more recent postoperative study would be helpful to confirm this.        Text Page    Coco Jacob, KAREN, FNP-BC, RN CNRN[MC1.1]       Revision History        User Key Date/Time User Provider Type Action    > MC1.3 1/23/2018 12:00 PM Coco Jacob APRN CNP Nurse Practitioner Sign     MC1.2 1/23/2018 11:29 AM Coco Jacob APRN CNP Nurse Practitioner      MC1.1 1/23/2018 10:55 AM Coco Jacob APRN CNP Nurse Practitioner                      Progress Notes - Physician (Notes from 01/21/18 through 01/24/18)      Progress Notes by Amanda Garcia RN at 1/24/2018  3:53 PM     Author:  Amanda Garcia RN Service:  (none) Author Type:  Registered Nurse    Filed:  1/24/2018  3:57 PM Date of Service:  1/24/2018  3:53 PM Creation Time:  1/24/2018  3:53 PM    Status:  Signed :  Amanda Garcia RN (Registered Nurse)         MD  Notification    Notified Person:  MD    Notified Persons Name:Coco Jacob APRN CNP    Notification Date/Time:[AY1.1]1/24/2018[AY1.2],[AY1.1] 3:54 PM[AY1.2]    Notification Interaction:  Talked with Physician via phone    Purpose of Notification:keppra level <2    Orders Received: orders to d/c on current plan BID 500mg and recheck level in 1 months    Comments: paged with that info[AY1.1]       Revision History        User Key Date/Time User Provider Type Action    > AY1.2 1/24/2018  3:57 PM Amanda Garcia, RN Registered Nurse Sign     AY1.1 1/24/2018  3:53 PM Amanda Garcia, RN Registered Nurse             Progress Notes by Jorge Vicente at 1/24/2018 10:36 AM     Author:  Jorge Vicente Service:  Social Work Author Type:      Filed:  1/24/2018  2:04 PM Date of Service:  1/24/2018 10:36 AM Creation Time:  1/24/2018 10:36 AM    Status:  Addendum :  Jorge Vicente ()         Care Transition Initial Assessment -   Reason For Consult: discharge planning  Met with: Patient   Active Problems:    Confusion         DATA  Lives With: alone  Living Arrangements: apartment       Identified issues/concerns regarding health management:  consult noted for discharge planning. Patient is a 67 year old female here with OBS status and is expected to be ready for D/C today. Met with patient. She was living alone and getting services which included housekeeping every other week, I meal per day and med set up. She has Medicare/Medicaid coverage. Patient has not yet seen therapies but she believes she is weak. Confusion is noted by RNs. Patient agrees to a referral to [facility] and other [place] facilities. [facility] and spoke with Bryan. He will assess patient, so a referral was sent via the [employer] process. Also called Susan at [facility] and sent a referral via [employer] process.      ASSESSMENT  Cognitive Status: Patient was alert, answered  questions and was able to state her wishes  Concerns to be addressed: SW will follow for D/C planning     PLAN  Financial costs for the patient includes: TBD. Hopefully she has MA coverage for a TCU stay and transport.  Patient given options and choices for discharge: Yes  Patient/family is agreeable to the plan? Yes  Patient Goals and Preferences: TCU  Patient anticipates discharging to: TCU[RH1.1]    Addendum  Patient was accepted at [facility], per Susan in Admissions. She has MA coverage, she reports and would need therapies ordered.   PAS-RR  Per DHS regulation, SW completed and submitted PAS-RR to [place] [employer] via the [place] [facility] [facility].  PAS-RR confirmation # is : [id number]  Further questions may be directed to [place] [facility] at #[phone], option #4 for PAS-RR staff.[RH1.2]  Update  MD has completed discharge orders which have been faxed to [facility] with the PAS. Updated patient who agrees to this plan. She requests wheelchair transport and was advised this is typically covered under her Medicaid, though this can not be guaranteed. [facility] wheelchair ride set at 1700. Patent asked that her sister be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 medical bed.[JM1.1]    Diagnosis:[EK1.3]    ICD-10-CM    1. Altered mental status, unspecified altered mental status type R41.82 UA with Microscopic     CK total     CK total     CANCELED: CK total[JF1.2]     Disposition:[EK1.3]   The patient is transferred to[JF1.3] [facility][JF1.4] and admitted into the care of [JF1.3] Aaron Fischer[JF1.7].[JF1.3]    Scribe Disclosure:  Ilsa DENNY, am serving as a scribe at 11:19 PM on 1/22/2018 to document services personally performed by Mark Steward MD based on my observations and the provider's statements to me.[EK1.3]    [facility] EMERGENCY DEPARTMENT[EK1.1]       Mark Steward MD  01/23/18 0419  [JM1.3]     Revision History        User Key Date/Time User Provider Type Action    > JM1.3 1/23/2018  4:19 AM Mark Steward MD Physician Sign     JF1.2 1/23/2018  3:43 AM Christoph Gracia Share     JF1.7 1/23/2018  3:24 AM Christoph Gracia      JF1.4 1/23/2018  2:45 AM Christoph Graciaibe Share     JM1.1 1/23/2018  2:15 AM Mark Steward MD Physician Share     JF1.3 1/23/2018  2:05 AM Christoph Graciaibe Share     JF1.6 1/23/2018  1:51 AM Christoph Graciaibe Share     JF1.5 1/23/2018  1:11 AM Christoph Graciaibe Share     JM1.2 1/23/2018 12:02 AM Mark Steward MD Physician Share     [N/A] 1/22/2018 11:59 PM Christoph Graciaibe Share     EK1.4 1/22/2018 11:35 PM Ilsa Mcclain Share     JF1.1 1/22/2018 11:35 PM Christoph Gracia      EK1.3 1/22/2018 11:28 PM Ilsa Mcclain Share     EK1.2 1/22/2018 10:51 PM Ilsa Mcclain Share     EK1.1 1/22/2018 10:46 PM Ilsa Mcclain             ED Notes by Chandra Burgos at 1/23/2018  2:41 AM     Author:   Chandra Burgos Service:  (none) Author Type:  Emergency Room Technician    Filed:  1/23/2018  2:41 AM Date of Service:  1/23/2018  2:41 AM Creation Time:  1/23/2018  2:41 AM    Status:  Signed :  Chandra Burgos (Emergency Room Technician)         Glass of water given.[JL1.1]      Revision History        User Key Date/Time User Provider Type Action    > JL1.1 1/23/2018  2:41 AM Chandra Burgos Emergency Room Technician Sign            ED Notes by Bassam Irizarry RN at 1/23/2018 12:46 AM     Author:  Bassam Irizarry RN Service:  (none) Author Type:  Registered Nurse    Filed:  1/23/2018 12:48 AM Date of Service:  1/23/2018 12:46 AM Creation Time:  1/23/2018 12:46 AM    Status:  Signed :  Bassam Irizarry RN (Registered Nurse)         [facility]  ED Nurse Handoff Report    Carri Arias is a 67 year old female   ED Chief complaint: Altered Mental Status  . ED Diagnosis:   Final diagnoses:   None     Allergies:   Allergies   Allergen Reactions     Penicillins Shortness Of Breath and Hives     Chest heaviness     Contrast Dye Hives       Code Status: Full Code  Activity level - Baseline/Home:  Independent. Activity Level - Current:   Stand with Assist of 2. Lift room needed: No. Bariatric: No   Needed: No   Isolation: No. Infection: Not Applicable.     Vital Signs:   Vitals:    01/22/18 2251 01/23/18 0000 01/23/18 0015 01/23/18 0030   BP: 140/65 (!) 104/32 129/68 119/43   Pulse: 60      Resp: 19 11 15    Temp: 99  F (37.2  C)      TempSrc: Oral      SpO2:  99% 99%        Cardiac Rhythm:  ,   Cardiac  Cardiac Rhythm: Normal sinus rhythm  Pain level:    Patient confused: Yes. Patient Falls Risk: Yes.   Elimination Status: Has voided   Patient Report - Initial Complaint: Altered Mental Status.   Focused Assessment: Cognitive/Perceptual/Neuro - Cognitive/Neuro/Behavioral WDL: orientation Orientation: disoriented to; place; time; situation Best Language: 0 - No aphasia Headache:  None LUE Sensation: no numbness; no tingling RUE Sensation: no numbness; no tingling LLE Sensation: no numbness; no tingling RLE Sensation: no numbness; no tingling  Pupils (CN II) - Pupil PERRLA: yes  Neal Coma Scale - Best Eye Response: 4-->(E4) spontaneous Best Motor Response: 6-->(M6) obeys commands Best Verbal Response: 4-->(V4) confused [place] Coma Scale Score: 14  NIH Stroke Scale - Interval: baseline 1a. Level Of Consciousness: 0-->Alert: keenly responsive 1b. LOC Questions: 2-->Answers neither question correctly 1c. LOC Commands: 0-->Performs both tasks correctly 2. Best Gaze: 0-->Normal 3. Visual: 0-->No visual loss 4. Facial Palsy: 0-->Normal symmetrical movements 5a. Motor Arm, Left: 0-->No drift: limb holds 90 (or 45) degrees for full 10 secs 5b. Motor Arm, Right: 0-->No drift: limb holds 90 (or 45) degrees for full 10 secs 6a. Motor Leg, Left: 0-->No drift: leg holds 30 degree position for full 5 secs 6b. Motor Leg, Right: 0-->No drift: leg holds 30 degree position for full 5 secs 7. Limb Ataxia: 0-->Absent 8. Sensory: 0-->Normal: no sensory loss 9. Best Language: 0-->No aphasia: normal 10. Dysarthria: 0-->Normal 11. Extinction and Inattention (formerly Neglect): 0-->No abnormality Total (NIH Stroke Scale): 2   Abnormal Results:[AS1.1]   Labs Ordered and Resulted from Time of ED Arrival Up to the Time of Departure from the ED   CBC WITH PLATELETS DIFFERENTIAL - Abnormal; Notable for the following:        Result Value    RBC Count 5.42 (*)     Hematocrit 48.9 (*)     All other components within normal limits   COMPREHENSIVE METABOLIC PANEL - Abnormal; Notable for the following:     Potassium 3.3 (*)     Glucose 104 (*)     Albumin 3.3 (*)     All other components within normal limits   TSH WITH FREE T4 REFLEX - Abnormal; Notable for the following:     TSH 17.43 (*)     All other components within normal limits   TROPONIN I   MAGNESIUM   AMMONIA   BLOOD GAS VENOUS AND OXYHGB   ALCOHOL ETHYL   T4 FREE    ROUTINE UA WITH MICROSCOPIC   DRUG ABUSE SCREEN 77 URINE (FL, RH, SH)   PERIPHERAL IV CATHETER     Head CT w/o contrast   Final Result   IMPRESSION:   1. No acute findings.   2. Interval right frontal craniotomy with resection of the previously   seen mass along the anterior falx.   3. Thin linear hyperdensity beneath the craniotomy site likely   represents some dural thickening/calcification and potentially some   minimal residual subdural blood. This is likely chronic, postoperative   change. Comparison with more recent postoperative study would be   helpful to confirm this.      Findings discussed with the ER physician at 11:40 p.m.      KACI HOLDEN MD[AS1.2]        .   Treatments provided: fluids  Family Comments: n/a  OBS brochure/video discussed/provided to patient:  No  ED Medications:   Medications   0.9% sodium chloride BOLUS (not administered)     Drips infusing:  No  For the majority of the shift, the patient's behavior Green. Interventions performed were pt education, therapeutic communication.     Severe Sepsis OR Septic Shock Diagnosis Present: No      ED Nurse Name/Phone Number: Bassam Irizarry,   12:46 AM[AS1.1]         Revision History        User Key Date/Time User Provider Type Action    > AS1.2 1/23/2018 12:48 AM Bassam Irizarry, RN Registered Nurse Sign     AS1.1 1/23/2018 12:46 AM Bassam Irizarry, RN Registered Nurse             ED Notes by Chandra Burgos at 1/22/2018 11:27 PM     Author:  Chandra Burgos Service:  (none) Author Type:  Emergency Room Technician    Filed:  1/22/2018 11:27 PM Date of Service:  1/22/2018 11:27 PM Creation Time:  1/22/2018 11:27 PM    Status:  Signed :  Chandra Burgos (Emergency Room Technician)         Applied monitoring devices (EKG, BP, and pulse ox) onto Patient.  Call light within reach.[JL1.1]      Revision History        User Key Date/Time User Provider Type Action    > JL1.1 1/22/2018 11:27 PM Chadnra Burgos Emergency Room Technician Sign             ED Notes by Indigo Maddox RN at 1/22/2018 11:23 PM     Author:  Indigo Maddox RN Service:  (none) Author Type:  Registered Nurse    Filed:  1/22/2018 11:23 PM Date of Service:  1/22/2018 11:23 PM Creation Time:  1/22/2018 11:23 PM    Status:  Signed :  Indigo Maddox RN (Registered Nurse)         Bed: ED04  Expected date:   Expected time:   Means of arrival:   Comments:  3     Revision History        User Key Date/Time User Provider Type Action    > KB1.1 1/22/2018 11:23 PM Indigo Maddox RN Registered Nurse Sign            ED Notes by Chandra Burgos at 1/22/2018 10:59 PM     Author:  Chandra Burgos Service:  (none) Author Type:  Emergency Room Technician    Filed:  1/22/2018 10:59 PM Date of Service:  1/22/2018 10:59 PM Creation Time:  1/22/2018 10:59 PM    Status:  Signed :  Chandra Burgos (Emergency Room Technician)         Applied monitoring devices (EKG, BP, and pulse ox) onto Patient.  Call light within reach.[JL1.1]      Revision History        User Key Date/Time User Provider Type Action    > JL1.1 1/22/2018 10:59 PM Chandra Burgos Emergency Room Technician Sign            ED Notes by Khalida Choudhary RN at 1/22/2018 10:48 PM     Author:  Khalida Choudhary RN Service:  (none) Author Type:  Registered Nurse    Filed:  1/22/2018 10:51 PM Date of Service:  1/22/2018 10:48 PM Creation Time:  1/22/2018 10:48 PM    Status:  Signed :  Khalida Choudhary RN (Registered Nurse)         Pt arrives on a transport hold via EMS after she was found on the floor drenched in urine. It is reported that it appears that pt was there for quite some time, possibly a couple days. Pt has history of recent brain surgery and mental illness. Pt is not orientated to place (she believed she was in [place]), time (believes it is 2016) etc. It was reported that she was unable to move upon arrival.[SH1.1]      Revision History        User Key Date/Time User Provider Type Action    >  SH1.1 1/22/2018 10:51 PM Khalida Choudhary RN Registered Nurse Sign            ED Notes by Polina Tinajero RN at 1/22/2018 10:42 PM     Author:  Polina Tinajero RN Service:  (none) Author Type:  Registered Nurse    Filed:  1/22/2018 10:42 PM Date of Service:  1/22/2018 10:42 PM Creation Time:  1/22/2018 10:42 PM    Status:  Signed :  Polina Tinajero RN (Registered Nurse)         Bed: ED03  Expected date: 1/22/18  Expected time: 10:40 PM  Means of arrival: Ambulance  Comments:  BV4     Revision History        User Key Date/Time User Provider Type Action    > RC1.1 1/22/2018 10:42 PM Polina Tinajero RN Registered Nurse Sign                  Procedure Notes     No notes of this type exist for this encounter.         Progress Notes - Therapies (Notes from 01/21/18 through 01/24/18)      Progress Notes by Evonne Ceron OT at 1/24/2018 11:35 AM     Author:  Evonne Ceron OT Service:  (none) Author Type:  Occupational Therapist    Filed:  1/24/2018 11:36 AM Date of Service:  1/24/2018 11:35 AM Creation Time:  1/24/2018 11:35 AM    Status:  Cosign Needed :  Evonne Ceron OT (Occupational Therapist)    Cosign Required:  Yes                                                                                           [facility]      OUTPATIENT OCCUPATIONAL THERAPY  EVALUATION  PLAN OF TREATMENT FOR OUTPATIENT REHABILITATION  (COMPLETE FOR INITIAL CLAIMS ONLY)  Patient's Last Name, First Name, M.I.  YOB: 1950  Carri Arias                          Provider's Name  [facility] Medical Record No.  [id number]                               Onset Date:  01/23/18   Start of Care Date:  01/24/18     Type:     ___PT   _X_OT   ___SLP Medical Diagnosis:  confusion with fall                        OT Diagnosis:  Impaired ADLs/IADLs and functional mobility   Visits from SOC:  1    _________________________________________________________________________________  Plan of Treatment/Functional Goals    Planned Interventions:  ,       Goals: See Occupational Therapy Goals on Care Plan in [facility] electronic health record.    Therapy Frequency:    Predicted Duration of Therapy Intervention: 1x  _________________________________________________________________________________    I CERTIFY THE NEED FOR THESE SERVICES FURNISHED UNDER        THIS PLAN OF TREATMENT AND WHILE UNDER MY CARE .             Physician Signature               Date    X_____________________________________________________                      Certification date from: 01/24/18, Certification date to: 01/24/18    Referring Physician: Sumit            Initial Assessment        See Occupational Therapy evaluation dated 01/24/18 in Epic electronic health record.[AL1.1]                     Revision History        User Key Date/Time User Provider Type Action    > AL1.1 1/24/2018 11:36 AM Evonne Ceron OT Occupational Therapist Sign            Progress Notes by Evonne Ceron OT at 1/24/2018 11:22 AM     Author:  Evonne Ceron OT Service:  (none) Author Type:  Occupational Therapist    Filed:  1/24/2018 11:22 AM Date of Service:  1/24/2018 11:22 AM Creation Time:  1/24/2018 11:22 AM    Status:  Signed :  Evonne Ceron OT (Occupational Therapist)          01/24/18 1032   Quick Adds   Type of Visit Initial Occupational Therapy Evaluation   Living Environment   Lives With alone   Living Arrangements apartment   Living Environment Comment Pt reports living in apt, on 2nd floor with no elevator access. Pt reports no longer driving (since Oct) d/t MD telling her to stop. Pt reports having house keeper run errands. House keeper 1x every other week. Pt reports also having another provider to take her to appointments. Pt has standard toiltes with grab bars and vanity, tub/shower with shower chair and grab bars.    Self-Care  "  Usual Activity Tolerance moderate   Activity/Exercise/Self-Care Comment Pt reports sleeping a lot and watching TV. Pt uses [facility] in apt.    Functional Level Prior   Ambulation 1-->assistive equipment   Transferring 0-->independent   Toileting 0-->independent   Bathing 1-->assistive equipment   Dressing 0-->independent   Eating 0-->independent   Communication 0-->understands/communicates without difficulty   Swallowing 0-->swallows foods/liquids without difficulty   Cognition 1 - attention or memory deficits   Fall history within last six months yes   Number of times patient has fallen within last six months (\" I cant even count\" )   Prior Functional Level Comment Pt reports IND with dressing, toileting, bathing, making microwave meals, medication management (reports unable to complete safely), finances. Pt has A with cleaning and laundry. Pt reports multiple falls due to LE weakness and buckling. Pt reports hitting head, bruising UE and LE.    General Information   Onset of Illness/Injury or Date of Surgery - Date 01/23/18   Referring Physician Sumit   Patient/Family Goals Statement \"im not going to a [facility]\"   Additional Occupational Profile Info/Pertinent History of Current Problem Pt is a 67-year-old female with a past medical history significant for meningioma, status post resection end of 2017, hypothyroidism, dyslipidemia, peripheral neuropathy, anxiety, previous TBI and reported cognitive defects. Pt admitted after reported confusion and fall and incontient.    Precautions/Limitations fall precautions   General Observations Pt agreeable to OT.    Cognitive Status Examination   Orientation person;place  (Month, day of week, date but not year. )   Level of Consciousness alert   Able to Follow Commands mild impairment   Personal Safety (Cognitive) decreased awareness, need for assist;decreased awareness, need for safety   Memory impaired   Attention Distractible during evaluation   Cognitive Comment Pt " knows current President, reports prior President is Ignacio. Pt reports coming to hospital due to fall. Reports on phone with friend Soham who was attempting to bring over meal- reports he called Police and stated that she was confused. Presents with impaired problem solving, attention to task, following directions. .    Visual Perception   Visual Perception Wears glasses   Sensory Examination   Sensory Comments B LE numbness at baseline   Pain Assessment   Patient Currently in Pain No   Range of Motion (ROM)   ROM Quick Adds No deficits were identified   Mobility   Bed Mobility Comments MOD I with use of bd rail   Transfer Skill: Bed to Chair/Chair to Bed   Level of Nicholas: Bed to Chair contact guard   Physical Assist/Nonphysical Assist: Bed to Chair supervision   Assistive Device - Transfer Skill Bed to Chair Chair to Bed Rehab Eval rolling walker   Transfer Skill: Sit to Stand   Level of Nicholas: Sit/Stand stand-by assist   Physical Assist/Nonphysical Assist: Sit/Stand supervision   Assistive Device for Transfer: Sit/Stand rolling walker   Lower Body Dressing   Level of Nicholas: Dress Lower Body stand-by assist   Grooming   Level of Nicholas: Grooming stand-by assist   Activities of Daily Living Analysis   Impairments Contributing to Impaired Activities of Daily Living cognition impaired;balance impaired   Clinical Impression   Criteria for Skilled Therapeutic Interventions Met yes, treatment indicated   OT Diagnosis Impaired ADLs/IADLs and functional mobility   Influenced by the following impairments Impaired cognition/safety, impaired balance with hx of multiple falls   Assessment of Occupational Performance 3-5 Performance Deficits   Identified Performance Deficits medication management, financial management, ADLs, functional mobility   Clinical Decision Making (Complexity) Moderate complexity   Predicted Duration of Therapy Intervention (days/wks) 1x   Anticipated Discharge Disposition  "Transitional Care Facility   Risks and Benefits of Treatment have been explained. Yes   Patient, Family & other staff in agreement with plan of care Yes   [facility] AM-PAC TM \"6 Clicks\"   2016, Trustees of [facility], under license to [employer].  All rights reserved.   6 Clicks Short Forms Daily Activity Inpatient Short Form   [facility] AM-PAC  \"6 Clicks\" Daily Activity Inpatient Short Form   1. Putting on and taking off regular lower body clothing? 3 - A Little   2. Bathing (including washing, rinsing, drying)? 3 - A Little   3. Toileting, which includes using toilet, bedpan or urinal? 3 - A Little   4. Putting on and taking off regular upper body clothing? 4 - None   5. Taking care of personal grooming such as brushing teeth? 3 - A Little   6. Eating meals? 4 - None   Daily Activity Raw Score (Score out of 24.Lower scores equate to lower levels of function) 20   Total Evaluation Time   Total Evaluation Time (Minutes) 15[AL1.1]        Revision History        User Key Date/Time User Provider Type Action    > AL1.1 1/24/2018 11:22 AM Evonne Ceron OT Occupational Therapist Sign            "

## 2018-01-23 NOTE — PLAN OF CARE
Problem: Patient Care Overview  Goal: Plan of Care/Patient Progress Review  Outcome: No Change  Patient admitted from [facility]. She is A&O. She states that she was not confused when the ambulance came to her home and that she had fallen and could not get off of the floor. She states that she has fallen at least 10 times over the last 6 months, stating that her legs just give out underneath her. She states that she is not continent of B&B at all times. Skin is intact except for some scattered bruises and scabs to BLE's. She states that she has one family member that she remains in contact with, but did not want to contact them this morning to let them know that she was in the hospital. Will continue to monitor.

## 2018-01-23 NOTE — IP AVS SNAPSHOT
MRN:[id number]                      After Visit Summary   1/23/2018    Carri Arias    MRN: [id number]           Thank you!     Thank you for choosing [facility] for your care. Our goal is always to provide you with excellent care. Hearing back from our patients is one way we can continue to improve our services. Please take a few minutes to complete the written survey that you may receive in the mail after you visit with us. Thank you!        Patient Information     Date Of Birth          1950        Designated Caregiver       Most Recent Value    Caregiver    Will someone help with your care after discharge? no      About your hospital stay     You were admitted on:  January 23, 2018 You last received care in the:  [facility] [address] [facility]    You were discharged on:  January 24, 2018        Reason for your hospital stay       You presented with confusion. No clear source of your confusion was found other than an elevated TSH which we will attempt to correct by increasing your Synthroid dose.                  Who to Call     For medical emergencies, please call 911.  For non-urgent questions about your medical care, please call your primary care provider or clinic, [phone]          Attending Provider     Provider Specialty    Aaron Fischer MD Internal Medicine    Sumit, Bassam Ramos DO Internal Medicine       Primary Care Provider Office Phone # Fax #    [facility] [phone]      After Care Instructions     Activity - Up with nursing assistance           Advance Diet as Tolerated       Follow this diet upon discharge: Orders Placed This Encounter        Regular Diet Adult            Daily weights       Call Provider for weight gain of more than 2 pounds per day or 5 pounds per week.            Fall precautions           General info for SNF       Length of Stay Estimate: Short Term Care: Estimated # of Days <30  Condition at  Discharge: Improving  Level of care:skilled   Rehabilitation Potential: Good  Admission H&P remains valid and up-to-date: Yes  Recent Chemotherapy: N/A  Use Nursing Home Standing Orders: Yes            Intake and output       Every shift            Mantoux instructions       Give two-step Mantoux (PPD) Per Facility Policy Yes                  Follow-up Appointments     Follow Up and recommended labs and tests       Follow up with [facility] physician.  The following labs/tests are recommended: Will need to follow up with a repeat Keppra level in 1 week. This was subtherapeutic on admission and suspect non-compliance.     - Will need to ensure follow up with her pcp in 4-6 weeks to re-check her TFT's to ensure TSH is down trending.                  Additional Services     Occupational Therapy Adult Consult       Evaluate and treat as clinically indicated.    Reason:  Deconditioning and Cognitive impairment.            Physical Therapy Adult Consult       Evaluate and treat as clinically indicated.    Reason:  Deconditioning                  Further instructions from your care team       [facility] phone, [phone]    Pending Results     No orders found for last 3 day(s).            Statement of Approval     Ordered          01/24/18 1235  I have reviewed and agree with all the recommendations and orders detailed in this document.  EFFECTIVE NOW     Approved and electronically signed by:  Rai Melgoza MD             Admission Information     Date & Time Provider Department Dept. Phone    1/23/2018 Bassam Arana DO [place] [address] [facility] [phone]      Your Vitals Were     Blood Pressure Pulse Temperature Respirations Weight Pulse Oximetry    118/52 (BP Location: Left arm) 60 98.8  F (37.1  C) (Oral) 16 58.5 kg (128 lb 15.5 oz) 95%    BMI (Body Mass Index)                   20.2 kg/m2           MyChart Information     [employer] lets you send messages to your  "doctor, view your test results, renew your prescriptions, schedule appointments and more. To sign up, go to [email or web address] . Click on \"Log in\" on the left side of the screen, which will take you to the Welcome page. Then click on \"Sign up Now\" on the right side of the page.     You will be asked to enter the access code listed below, as well as some personal information. Please follow the directions to create your username and password.     Your access code is: [email or web address]  Expires: 2018  3:33 PM     Your access code will  in 90 days. If you need help or a new code, please call your [facility] clinic or [phone].        Care EveryWhere ID     This is your Care EveryWhere ID. This could be used by other organizations to access your [facility] medical records  [id number]        Equal Access to Services     [facility] Hospital[facility]ESSE : Dez Allen, teri bhakta, anabella lopez, jose king . So [facility] [phone].    ATENCIÓN: Si terrila vu, tiene a escobedo disposición servicios gratuitos de asistencia lingüística. Marilynn al [phone].    We comply with applicable federal civil rights laws and [place] laws. We do not discriminate on the basis of race, color, national origin, age, disability, sex, sexual orientation, or gender identity.               Review of your medicines      CONTINUE these medicines which may have CHANGED, or have new prescriptions. If we are uncertain of the size of tablets/capsules you have at home, strength may be listed as something that might have changed.        Dose / Directions    levothyroxine 100 MCG tablet   Commonly known as:  SYNTHROID/LEVOTHROID   This may have changed:    - medication strength  - how much to take   Used for:  Acquired hypothyroidism        Dose:  100 mcg   Start taking on:  2018   Take 1 tablet (100 mcg) by mouth daily   Quantity:  30 tablet   Refills:  3         CONTINUE these medicines " which have NOT CHANGED        Dose / Directions    Calcium-Vitamin D 600-400 MG-UNIT Tabs        Dose:  1 tablet   Take 1 tablet by mouth 2 times daily   Refills:  0       FLUoxetine 20 MG tablet        Dose:  20 mg   Take 20 mg by mouth every morning   Refills:  0       gabapentin 300 MG capsule   Commonly known as:  NEURONTIN        Dose:  600 mg   Take 600 mg by mouth 3 times daily   Refills:  0       KEPPRA 500 MG tablet   Generic drug:  levETIRAcetam        Dose:  500 mg   Take 500 mg by mouth 2 times daily   Refills:  0       simvastatin 20 MG tablet   Commonly known as:  ZOCOR        Dose:  20 mg   Take 20 mg by mouth At Bedtime   Refills:  0            Where to get your medicines      Some of these will need a paper prescription and others can be bought over the counter. Ask your nurse if you have questions.     You don't need a prescription for these medications     levothyroxine 100 MCG tablet                Protect others around you: Learn how to safely use, store and throw away your medicines at www.disposemymeds.org.             Medication List: This is a list of all your medications and when to take them. Check marks below indicate your daily home schedule. Keep this list as a reference.      Medications           Morning Afternoon Evening Bedtime As Needed    Calcium-Vitamin D 600-400 MG-UNIT Tabs   Take 1 tablet by mouth 2 times daily                                FLUoxetine 20 MG tablet   Take 20 mg by mouth every morning                                gabapentin 300 MG capsule   Commonly known as:  NEURONTIN   Take 600 mg by mouth 3 times daily   Last time this was given:  600 mg on 1/24/2018  4:07 PM                                KEPPRA 500 MG tablet   Take 500 mg by mouth 2 times daily   Last time this was given:  500 mg on 1/24/2018  8:01 AM   Generic drug:  levETIRAcetam                                levothyroxine 100 MCG tablet   Commonly known as:  SYNTHROID/LEVOTHROID   Take 1 tablet (100  mcg) by mouth daily   Start taking on:  1/25/2018   Last time this was given:  100 mcg on 1/24/2018  8:01 AM                                simvastatin 20 MG tablet   Commonly known as:  ZOCOR   Take 20 mg by mouth At Bedtime

## 2018-01-23 NOTE — ED NOTES
Pt arrives on a transport hold via EMS after she was found on the floor drenched in urine. It is reported that it appears that pt was there for quite some time, possibly a couple days. Pt has history of recent brain surgery and mental illness. Pt is not orientated to place (she believed she was in [place]), time (believes it is 2016) etc. It was reported that she was unable to move upon arrival.

## 2018-01-23 NOTE — CONSULTS
[facility]    Neurology Consultation Note     Carri Arias MRN# [id number]   YOB: 1950 Age: 67 year old    Code Status:DNR/DNI   Date of Admission: 1/23/2018  Date of Consult: 01/23/2018    _________________________________   Primary Care Physician   [facility]    Reason for consult: I was asked by Dr. Flores to evaluate this patient for confusion        ______________________________________________         Assessment & Plan   ______________________________________________  #. (R41.0) Confusion  (primary encounter diagnosis)  --likely multifactorial  --increasing over preceding weeks  --significant elevation in TSH, reported cognitive deficits following meningioma resection  --placed on keppra s/p meningioma resection  -----keppra level pending  --patient reports she often forgets to take her medications, needs assistance with medication management at home  ------may benefit from rehab stay instead of direct return to home  --will not get MRI brain at this time due to patient with minimal confusion and significant elevation in TSH, never returned to baseline after meningioma resection  #. (Z98.890) History of resection of meningioma  --resection at [facility] October 2017  --CT with postoperative changes, no acute abnormalities  #. (Z86.79) History of subarachnoid hemorrhage  --March 2017 related to a fall  #. (G62.9) Peripheral polyneuropathy  --on gabapentin  mg TID  #. (E03.9) Acquired hypothyroidism  --significant elevation in TSH  ----hypothyroidism can cause confusion and memory loss  #. DVT Prophylaxis  --per primary service  #. PT/OT/Speech  --Start evaluations  #. Nutrition / GI Prophylaxis  --Per recommendations of speech therapy      #. Code Status: DNR / DNI      Chief Complaint   ______________________________________________  Confusion   History is obtained from the patient and electronic health record    History of  Present Illness   ______________________________________________  Carri Arias is a 67 year old female who presented with confusion. History of meningioma, resected in October 2017 at [facility], previous TBI and reported cognitive defects. She had a fall in March 2013 with subarachnoid hemorrhage.  She was placed on keppra following meningioma resection. Friend was concerned the day prior to admission due to finding the patient confused on the phone. EMS was called for a welfare check. EMS found her on the floor smelling of urine and she was brought to the [facility] ED for evaluation. CT showed postoperative changes but no acute abnormalities. There were no available beds, so patient was transferred to [facility] for monitoring and further evaluation. Sister who lives in [place] reports increasing confusion noted in the past few weeks over the phone. She has had falls at home recently and family is concerned over her safety.    On exam, patient is mildly confused. She provides the incorrect hospital name, but knows she is in a hospital, and is off by one day on orientation. She is able to answer current events and historical recall questions and abstract thinking is intact. No focal weakness, sensation intact other than baseline neuropathy in feet. Reports falls a couple times per month, once with injury to her back. She reports she often forgets to take her medications. Memory is not quite back to what it used to be prior to surgery. She reports she was to get a machine to help her remember to take her medications, but she has not received it.     Past Medical History    ______________________________________________  Past Medical History:   Diagnosis Date     Arthritis     knees and hips     Hypercholesterolemia      Hypothyroidism      Tremors     from thyroid?     Past Surgical History   ______________________________________________  Past Surgical History:   Procedure Laterality Date      APPENDECTOMY       HYSTERECTOMY TOTAL ABDOMINAL, BILATERAL SALPINGO-OOPHORECTOMY, COMBINED       LUMPECTOMY BREAST       THYROIDECTOMY  8/31/2012    Procedure: THYROIDECTOMY;  Total Thyroidectomy;  Surgeon: Melany Arellano MD;  Location: [facility] OR     TONSILLECTOMY       Prior to Admission Medications   ______________________________________________  Prior to Admission Medications   Prescriptions Last Dose Informant Patient Reported? Taking?   Calcium Carb-Cholecalciferol (CALCIUM-VITAMIN D) 600-400 MG-UNIT TABS 1/19/2018 Other Yes Yes   Sig: Take 1 tablet by mouth 2 times daily   FLUoxetine 20 MG tablet 1/19/2018 Other Yes Yes   Sig: Take 20 mg by mouth every morning   gabapentin (NEURONTIN) 300 MG capsule 1/19/2018 Other Yes Yes   Sig: Take 600 mg by mouth 3 times daily   levETIRAcetam (KEPPRA) 500 MG tablet Few weeks ago at Unknown time Other Yes No   Sig: Take 500 mg by mouth 2 times daily   levothyroxine (SYNTHROID/LEVOTHROID) 88 MCG tablet 1/19/2018 Other Yes Yes   Sig: Take 88 mcg by mouth daily   simvastatin (ZOCOR) 20 MG tablet 1/19/2018 Other Yes Yes   Sig: Take 20 mg by mouth At Bedtime      Facility-Administered Medications: None     Allergies   Allergies   Allergen Reactions     Penicillins Shortness Of Breath and Hives     Chest heaviness     Contrast Dye Hives       Social History   ______________________________________________  Social History     Social History     Marital status: Single     Spouse name: N/A     Number of children: N/A     Years of education: N/A     Social History Main Topics     Smoking status: Current Every Day Smoker     Smokeless tobacco: Not on file      Comment: 4-5 daily     Alcohol use Yes      Comment: rarely     Drug use: No     Sexual activity: Not on file     Other Topics Concern     Not on file     Social History Narrative     No narrative on file       Family History   ______________________________________________  Reviewed and not felt to be contributory.     Review  of Systems   ______________________________________________  A comprehensive review of  10 systems was performed and found to be negative except as described in this note  CONSTITUTIONAL: negative for fever, chills, change in weight  INTEGUMENTARY/SKIN: no rash or obvious new lesions  ENT/MOUTH: no sore throat, new sinus pain or nasal drainage, no neck mass noted  RESP: No pleuretic pain, No cough, no hemoptysis, No SOB   CV: negative for chest pain, palpitations or peripheral edema  GI: no nausea, vomiting, change in stools  : no dysuria or hematuria  MUSCULOSKELETAL: no myalgias, arthralgias or join efffusion  ENDOCRINE: no history of polyuria, polydyspsia or symptoms of thyroid dysfunction  PSYCHIATRIC: no change in mood stable  LYMPHATIC: no new lymphadenopathy  HEME: no bleeding or easy bruisability  NEURO: see HPI    Physical Exam   ______________________________________________  Weight:128 lbs 15.51 oz; Height:Data Unavailable  Temp: 98  F (36.7  C) Temp src: Oral BP: 122/67 Pulse: 57 Heart Rate: 49 Resp: 18 SpO2: 98 % O2 Device: None (Room air)    General Appearance:  No acute distress  Neuro:       Mental Status Exam:   Awake, alert, oriented X2. Intermittent mild slurred speech, no clear aphasia. Oriented to current/historical events and recall, abstract thinking intact. Mental status is mildly confused       Cranial Nerves:  Pupils 3 mm, reactive. EOMI. Face sensation is normal. Face is symmetric. Tongue and uvula are midline. Other CN are normal           Motor:  Generalized weakness, symmetric. Tone and bulk are normal           Reflexes:  Normal DTR. Toes downgoing.        Sensory:  Normal to light touch               Coordination:   Intact finger-to-nose        Gait:  Up with assistance  Neck: no nuchal rigidity, normal thyroid. No carotid bruits.    Cardiovascular: Regular rate and rhythm, no m/r/g  Lungs: Clear to auscultation  Abdomen: Soft, not tender, not distended  Extremities: No clubbing, no  cyanosis, no edema    Data   ______________________________________________  All Data personally reviewed:       Labs:   CBC RESULTS:     Recent Labs  Lab 01/22/18 2257   WBC 9.9   RBC 5.42*   HGB 15.7   HCT 48.9*        Basic Metabolic Panel:   Recent Labs   Lab Test  01/23/18   0910  01/22/18 2257 04/17/14   0745  04/12/14   0630   NA   --   141  140  143   POTASSIUM  3.7  3.3*  4.2  4.1   CHLORIDE   --   104  105  108   CO2   --   25  30  29   BUN   --   24  13  13   CR   --   0.80  0.61  0.70   GLC   --   104*  94  88   VICKY   --   8.8  8.2*  8.3*     Liver panel:  Recent Labs   Lab Test  01/22/18 2257   PROTTOTAL  7.3   ALBUMIN  3.3*   BILITOTAL  0.5   ALKPHOS  95   AST  18   ALT  16     Troponin I:   Recent Labs   Lab Test  01/22/18 2257   TROPI  <0.015     Ammonia:   Recent Labs   Lab Test  01/22/18 2257   FLASH  18     CK:   Recent Labs   Lab Test  01/22/18 2257   CKT  192          Drug Screen: Recent Labs   Lab Test  01/23/18   0057   UAMP  Negative   UBARB  Negative   BENZODIAZEUR  Negative   UCANN  Negative   UCOC  Negative   OPIT  Negative   UPCP  Negative     Alcohol level:  Recent Labs   Lab Test  01/22/18 2257   ETOH  <0.01     UA Results:  Recent Labs   Lab Test  01/23/18   0142   COLOR  Yellow   APPEARANCE  Clear   URINEGLC  Negative   URINEBILI  Negative   URINEKETONE  80*   SG  1.024   UBLD  Small*   URINEPH  5.0   PROTEIN  Negative   NITRITE  Negative   LEUKEST  Negative   RBCU  4*   WBCU  2     Most Recent 6 Bacteria Isolates From Any Culture (See EPIC Reports for Culture Details):  Recent Labs   Lab Test  04/08/14   1328   CULT  >100,000 colonies/mL Escherichia coli*        Cardiac US:   --       Neurophysiology:   --       Imaging:   All imaging studies were reviewed personally  CT head 1/22/18:   1. No acute findings.  2. Interval right frontal craniotomy with resection of the previously seen mass along the anterior falx.  3. Thin linear hyperdensity beneath the craniotomy  site likely represents some dural thickening/calcification and potentially some minimal residual subdural blood. This is likely chronic, postoperative change. Comparison with more recent postoperative study would be helpful to confirm this.        Text Page    Coco Jacob, KAREN, FNP-BC, RN CNRN

## 2018-01-23 NOTE — IP AVS SNAPSHOT
` `     [place] [address] [facility]: [phone]            Medication Administration Report for Carri Arias as of 01/24/18 1626   Legend:    Given Hold Not Given Due Canceled Entry Other Actions    Time Time (Time) Time  Time-Action       Inactive    Active    Linked        Medications 01/18/18 01/19/18 01/20/18 01/21/18 01/22/18 01/23/18 01/24/18    acetaminophen (TYLENOL) Suppository 650 mg  Dose: 650 mg  Freq: EVERY 4 HOURS PRN Route: RE  PRN Reason: mild pain  Start: 01/23/18 0607   Admin Instructions: Alternate ibuprofen (if ordered) with acetaminophen.  Maximum acetaminophen dose from all sources = 75 mg/kg/day not to exceed 4 grams/day.               acetaminophen (TYLENOL) tablet 650 mg  Dose: 650 mg  Freq: EVERY 4 HOURS PRN Route: PO  PRN Reason: mild pain  Start: 01/23/18 0607   Admin Instructions: Alternate ibuprofen (if ordered) with acetaminophen.  Maximum acetaminophen dose from all sources = 75 mg/kg/day not to exceed 4 grams/day.          1202 (650 mg)-Given            bisacodyl (DULCOLAX) Suppository 10 mg  Dose: 10 mg  Freq: DAILY PRN Route: RE  PRN Reason: constipation  Start: 01/23/18 0608   Admin Instructions: Hold for loose stools.  This is the third step of a three step constipation treatment.               FLUoxetine (PROzac) capsule 20 mg  Dose: 20 mg  Freq: EVERY MORNING Route: PO  Start: 01/23/18 1045         1106 (20 mg)-Given        0801 (20 mg)-Given           gabapentin (NEURONTIN) capsule 600 mg  Dose: 600 mg  Freq: 3 TIMES DAILY Route: PO  Start: 01/23/18 1045         1106 (600 mg)-Given       1648 (600 mg)-Given       2135 (600 mg)-Given        0801 (600 mg)-Given       1607 (600 mg)-Given       [ ] 2200           levETIRAcetam (KEPPRA) tablet 500 mg  Dose: 500 mg  Freq: 2 TIMES DAILY Route: PO  Start: 01/23/18 1045         1256 (500 mg)-Given       2135 (500 mg)-Given        0801 (500 mg)-Given       [ ] 2100           levothyroxine  (SYNTHROID/LEVOTHROID) tablet 100 mcg  Dose: 100 mcg  Freq: DAILY Route: PO  Start: 01/24/18 0900   Admin Instructions: Separate oral administration of iron- or calcium-containing products and levothyroxine by at least 4 hours.           0801 (100 mcg)-Given           naloxone (NARCAN) injection 0.1-0.4 mg  Dose: 0.1-0.4 mg  Freq: EVERY 2 MIN PRN Route: IV  PRN Reason: opioid reversal  Start: 01/23/18 0607   Admin Instructions: For respiratory rate LESS than or EQUAL to 8.  Partial reversal dose:  0.1 mg titrated q 2 minutes for Analgesia Side Effects Monitoring Sedation Level of 3 (frequently drowsy, arousable, drifts to sleep during conversation).Full reversal dose:  0.4 mg bolus for Analgesia Side Effects Monitoring Sedation Level of 4 (somnolent, minimal or no response to stimulation).  For ordered doses up to 2mg give IVP. Give each 0.4mg over 15 seconds in emergency situations. For non-emergent situations further dilute in 9mL of NS to facilitate titration of response.               ondansetron (ZOFRAN-ODT) ODT tab 4 mg  Dose: 4 mg  Freq: EVERY 6 HOURS PRN Route: PO  PRN Reasons: nausea,vomiting  Start: 01/23/18 0607   Admin Instructions: This is Step 1 of nausea and vomiting management.  If nausea not resolved in 15 minutes, go to Step 2 prochlorperazine (COMPAZINE). Do not push through foil backing. Peel back foil and gently remove. Place on tongue immediately. Administration with liquid unnecessary  With dry hands, peel back foil backing and gently remove tablet; do not push oral disintegrating tablet through foil backing; administer immediately on tongue and oral disintegrating tablet dissolves in seconds; then swallow with saliva; liquid not required.              Or  ondansetron (ZOFRAN) injection 4 mg  Dose: 4 mg  Freq: EVERY 6 HOURS PRN Route: IV  PRN Reasons: nausea,vomiting  Start: 01/23/18 0607   Admin Instructions: This is Step 1 of nausea and vomiting management.  If nausea not resolved in 15  minutes, go to Step 2 prochlorperazine (COMPAZINE).  Irritant. For ordered doses up to 4 mg, give IV Push undiluted over 2-5 minutes.               polyethylene glycol (MIRALAX/GLYCOLAX) Packet 17 g  Dose: 17 g  Freq: DAILY PRN Route: PO  PRN Reason: constipation  Start: 01/23/18 0608   Admin Instructions: Give in 8oz of  water, juice, or soda. Hold for loose stools.  This is the second step of a three step constipation treatment.  1 Packet = 17 grams. Mixed prescribed dose in 8 ounces of water. Follow with 8 oz. of water.               potassium chloride (KLOR-CON) Packet 20-40 mEq  Dose: 20-40 mEq  Freq: EVERY 2 HOURS PRN Route: ORAL OR FEED  PRN Reason: potassium supplementation  Start: 01/23/18 0610   Admin Instructions: Use if unable to tolerate tablets.  If Serum K+ 3.0-3.3, dose = 60 mEq po total dose (40 mEq x1 followed in 2 hours by 20 mEq x1). Recheck K+ level 4 hours after dose and the next AM.  If Serum K+ 2.5-2.9, dose = 80 mEq po total dose (40 mEq Q2H x2). Recheck K+ level 4 hours after dose and the next AM.  If Serum K+ less than 2.5, See IV order.  Dissolve packet contents in 4-8 ounces of cold water or juice.               potassium chloride 10 mEq in 100 mL intermittent infusion with 10 mg lidocaine  Dose: 10 mEq  Freq: EVERY 1 HOUR PRN Route: IV  PRN Reason: potassium supplementation  Start: 01/23/18 0610   Admin Instructions: Infuse via PERIPHERAL LINE. Use potassium with lidocaine for pain with peripheral administration.  If Serum K+ 3.0-3.3, dose = 10 mEq/hr x4 doses (40 mEq IV total dose). Recheck K+ level 2 hours after dose and the next AM.  If Serum K+ less than 3.0, dose = 10 mEq/hr x6 doses (60 mEq IV total dose). Recheck K+ level 2 hours after dose and the next AM.               potassium chloride 10 mEq in 100 mL sterile water intermittent infusion (premix)  Dose: 10 mEq  Freq: EVERY 1 HOUR PRN Route: IV  PRN Reason: potassium supplementation  Start: 01/23/18 0610   Admin Instructions:  Infuse via PERIPHERAL LINE or CENTRAL LINE. Use for central line replacement if patient weight less than 65 kg, if patient is on TPN with high potassium content or if unit does not stock 20 mEq bags.   If Serum K+ 3.0-3.3, dose = 10 mEq/hr x4 doses (40 mEq IV total dose). Recheck K+ level 2 hours after dose and the next AM.   If Serum K+ less than 3.0, dose = 10 mEq/hr x6 doses (60 mEq IV total dose). Recheck K+ level 2 hours after dose and the next AM.               potassium chloride 20 mEq in 50 mL intermittent infusion  Dose: 20 mEq  Freq: EVERY 1 HOUR PRN Route: IV  PRN Reason: potassium supplementation  Start: 01/23/18 0610   Admin Instructions: Infuse via CENTRAL LINE Only. May need EKG if less than 65 kg or on TPN - Max rate is 0.3 mEq/kg/hr for patients not on EKG monitoring.   If Serum K+ 3.0-3.3, dose = 20 mEq/hr x2 doses (40 mEq IV total dose). Recheck K+ level 2 hours after dose and the next AM.  If Serum K+ less than 3.0, dose = 20 mEq/hr x3 doses (60 mEq IV total dose). Recheck K+ level 2 hours after dose and the next AM.               potassium chloride SA (K-DUR/KLOR-CON M) CR tablet 20-40 mEq  Dose: 20-40 mEq  Freq: EVERY 2 HOURS PRN Route: PO  PRN Reason: potassium supplementation  Start: 01/23/18 0610   Admin Instructions: Use if able to take PO.   If Serum K+ 3.0-3.3, dose = 60 mEq po total dose (40 mEq x1 followed in 2 hours by 20 mEq x1). Recheck K+ level 4 hours after dose and the next AM.  If Serum K+ 2.5-2.9, dose = 80 mEq po total dose (40 mEq Q2H x2). Recheck K+ level 4 hours after dose and the next AM.  If Serum K+ less than 2.5, See IV order.  DO NOT CRUSH               senna-docusate (SENOKOT-S;PERICOLACE) 8.6-50 MG per tablet 1 tablet  Dose: 1 tablet  Freq: 2 TIMES DAILY PRN Route: PO  PRN Reason: constipation  Start: 01/23/18 0608   Admin Instructions: If no bowel movement in 24 hours, increase to 2 tablets PO.  Hold for loose stools.  This is the first step of a three step  constipation treatment.              Or  senna-docusate (SENOKOT-S;PERICOLACE) 8.6-50 MG per tablet 2 tablet  Dose: 2 tablet  Freq: 2 TIMES DAILY PRN Route: PO  PRN Reason: constipation  Start: 01/23/18 0608   Admin Instructions: Hold for loose stools.  This is the first step of a three step constipation treatment.              Discontinued Medications  Medications 01/18/18 01/19/18 01/20/18 01/21/18 01/22/18 01/23/18 01/24/18         Dose: 88 mcg  Freq: DAILY Route: PO  Start: 01/23/18 1045   End: 01/23/18 1254   Admin Instructions: Separate oral administration of iron- or calcium-containing products and levothyroxine by at least 4 hours.          1106 (88 mcg)-Given       1254-Med Discontinued
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
## 2018-01-23 NOTE — PROGRESS NOTES
[facility]    Hospitalist Progress Note  Name: Carri Arias    MRN: [id number]  Provider:  Rubén Flores DO, FHM (Text Page)    Assessment & Plan   Summary of Stay:  Ms. 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   She was then sent to [facility].    1.  Altered mentation with complex 2017 neuro/neurosurg history:  -  I spoke with the patients sister Yudy and her home care RN Cortney Pereyra.  The patients sister who lives in [place] reports increasing confusion noted on the phone the last few weeks.  Her home care RN reports the patient has not been answering her phone/door well and has had overt medication confusion lately.  They did a welfare check just a week ago and more home visits lately.  There is a lot of concern about home safety.  The patient has also had some falls though it is unclear how often the past few weeks.  The patient feels everyone is over concerned and she is fine.  Given the concerns in the context of her history, I will ask neurology to see her.  I also am checking keppra levels.  PT/OT for home safety assessments also pending. Continue monitoring for now.  There is no overt infection or other clear cause of acute confusion.    2.  Neuropathy:  -  Resume gabapentin    3.  TBI, meningioma resection fairly recently, subarach hem last year:  -  Keppra level  -  Neuro checks    4.  Hypothyroidism:  -  TSH high but free T4 normal range.  Levothyroxine to continue.  Consider slight dose adjustment with outpatient f/u.  This does not appear the cause of he significant confusion.  (Addendum:  Neurology feels this may be contributing.  Her levothyroxine was increased here, recheck outpatient  needed in 4 weeks.)    DVT Prophylaxis: Low Risk/Ambulatory with no VTE prophylaxis indicated  Code Status: DNR/DNI    Disposition Plan   Expected discharge in 1-2 days to unclear, depends on further safety eval/neuro eval.     Entered: Rubén Flores 01/23/2018, 10:34 AM     Interval History   Assumed care for the day, following up from her arrival this AM.  Patient feels well now, denies acute complaints.  No pain, sob, nausea.  She chronically has occasional leg pain and would like her neurontin.    -Data reviewed today: I reviewed all new labs and imaging reports over the last 24 hours. I personally reviewed no images or EKG's today.    Physical Exam   Temp: 98  F (36.7  C) Temp src: Oral BP: 122/67 Pulse: 57 Heart Rate: 49 Resp: 18 SpO2: 98 % O2 Device: None (Room air)    Vitals:    01/23/18 0528   Weight: 58.5 kg (128 lb 15.5 oz)     Vital Signs with Ranges  Temp:  [98  F (36.7  C)-99  F (37.2  C)] 98  F (36.7  C)  Pulse:  [57-60] 57  Heart Rate:  [44-56] 49  Resp:  [11-19] 18  BP: ()/(32-90) 122/67  SpO2:  [74 %-100 %] 98 %       GEN:  Alert, oriented x to self and place, confused with lots of details but answers basic questions well, appears comfortable, NAD.   Appeared somewhat unsteady when up transferring to chair with RN assistance during my visit.  Stable exam otherwise from earlier today.    Medications        FLUoxetine  20 mg Oral QAM     gabapentin  600 mg Oral TID     levETIRAcetam  500 mg Oral BID     levothyroxine  88 mcg Oral Daily     Data       Recent Labs  Lab 01/22/18  2257   WBC 9.9   HGB 15.7   HCT 48.9*   MCV 90          Recent Labs  Lab 01/23/18  0910 01/22/18  2257   NA  --  141   POTASSIUM 3.7 3.3*   CHLORIDE  --  104   CO2  --  25   ANIONGAP  --  12   GLC  --  104*   BUN  --  24   CR  --  0.80   GFRESTIMATED  --  72   GFRESTBLACK  --  87   VICKY  --  8.8   MAG  --  2.0   PROTTOTAL  --  7.3   ALBUMIN  --  3.3*   BILITOTAL  --  0.5   ALKPHOS  --  95   AST  --  18   ALT  --   16       Recent Labs  Lab 01/22/18  2257   TROPI <0.015       Recent Labs  Lab 01/23/18  0142   COLOR Yellow   APPEARANCE Clear   URINEGLC Negative   URINEBILI Negative   URINEKETONE 80*   SG 1.024   UBLD Small*   URINEPH 5.0   PROTEIN Negative   NITRITE Negative   LEUKEST Negative   RBCU 4*   WBCU 2       Recent Results (from the past 24 hour(s))   Head CT w/o contrast    Narrative    CT HEAD W/O CONTRAST  1/22/2018 11:23 PM     HISTORY: AMS.    TECHNIQUE: Axial images of the head and coronal reformations without  IV contrast material. Radiation dose for this scan was reduced using  automated exposure control, adjustment of the mA and/or kV according  to patient size, or iterative reconstruction technique.    COMPARISON: 5/30/2017.    FINDINGS: Interval right frontal craniotomy with resection of the  previously seen partially calcified mass along the anterior falx which  was likely a meningioma. Just beneath the craniotomy site there is a  small amount of linear high density along the dura which is likely  postoperative change or dural calcification. Some minimal residual  subdural blood in this region is difficult to exclude but this likely  represents chronic, postoperative change. This measures up to 0.4 cm  in thickness. No associated mass effect. Otherwise no intracranial  hemorrhage. No acute infarct identified. Low attenuation areas are  present in white matter of the cerebral hemispheres that are  nonspecific but consistent with chronic small vessel ischemic changes  in this age patient.      Impression    IMPRESSION:  1. No acute findings.  2. Interval right frontal craniotomy with resection of the previously  seen mass along the anterior falx.  3. Thin linear hyperdensity beneath the craniotomy site likely  represents some dural thickening/calcification and potentially some  minimal residual subdural blood. This is likely chronic, postoperative  change. Comparison with more recent postoperative study  would be  helpful to confirm this.    Findings discussed with the ER physician at 11:40 p.m.    KACI HOLDEN MD

## 2018-01-23 NOTE — PHARMACY-ADMISSION MEDICATION HISTORY
"Admission medication history interview status for the 1/23/2018  admission is complete. See EPIC admission navigator for prior to admission medications     Medication history source reliability:Good    Actions taken by pharmacist (provider contacted, etc): [facility] contacted ([phone]). PTA med list updated using [facility]    Additional medication history information not noted on PTA med list : Patient's home care nurse ([place]: [phone]) states the patient refused any home care services for the past 2 weeks. Patient states the last time she took her medications was on 01/19/2018; she states she has not taken her Keppra in \"few weeks.\" Patient does not seem compliant with her medications.    Medication reconciliation/reorder completed by provider prior to medication history? Yes    Time spent in this activity: 45 minutes    Prior to Admission medications    Medication Sig Last Dose Taking? Auth Provider   gabapentin (NEURONTIN) 300 MG capsule Take 600 mg by mouth 3 times daily 1/19/2018 Yes Unknown, Entered By History   simvastatin (ZOCOR) 20 MG tablet Take 20 mg by mouth At Bedtime 1/19/2018 Yes Unknown, Entered By History   levothyroxine (SYNTHROID/LEVOTHROID) 88 MCG tablet Take 88 mcg by mouth daily 1/19/2018 Yes Unknown, Entered By History   Calcium Carb-Cholecalciferol (CALCIUM-VITAMIN D) 600-400 MG-UNIT TABS Take 1 tablet by mouth 2 times daily 1/19/2018 Yes Unknown, Entered By History   FLUoxetine 20 MG tablet Take 20 mg by mouth every morning 1/19/2018 Yes Unknown, Entered By History   levETIRAcetam (KEPPRA) 500 MG tablet Take 500 mg by mouth 2 times daily Few weeks ago at Unknown time  Unknown, Entered By History       "

## 2018-01-23 NOTE — H&P
Admitted:     01/23/2018      MEDICINE ADMISSION:        DATE OF ADMISSION:  01/23/2018      PRIMARY CARE PHYSICIAN:  [facility]      CODE STATUS:  DNR/DNI.      CHIEF COMPLAINT:  Concern for confusion.      HISTORY OF PRESENT ILLNESS:  Ms. Arias is a 67-year-old female with a past medical history significant for meningioma, status post resection end of 2017, hypothyroidism, dyslipidemia, peripheral neuropathy, anxiety, previous TBI and reported cognitive defects, who presents to the Emergency Department at [facility] when a friend that she was confused.  History is obtained through discussion with the patient and chart review.  Per report, the patient was called by a friend yesterday who was concerned when the patient was making statements about being in [place] when she was clearly in [place].  The friend called EMS to have a welfare check on the patient.  When EMS arrived to the patient's home, they found her sitting on the floor and smelling of urine.  They brought her into the Emergency Department for further evaluation.        When I saw the patient here on the 6th floor at [facility], the patient could tell me what happened and that the police officers came into her house and asked her why she was sitting on the floor, to which she told them that she simply sat down there.  She however notes that she cannot recall how she got to be in [place].  The patient has not had any other recent symptoms leading up to today.  She is a bit confused on timelines, as she said she had her surgery about 6 weeks ago though it was roughly 4 months ago.  She however does remember going to a rehab center near [facility].  She denies any chest discomfort, shortness of breath, fevers or chills, cough, abdominal discomfort, nausea or diarrhea.  She does have some back discomfort which she states is chronic.  There is no recent exacerbation of this.      While she was in the ED at [facility], she had a workup which was unremarkable.  Head CT showed chronic postoperative changes but nothing acute.  UA was bland.  Drug of abuse screen, ethanol level were negative.  CBC, BMP and LFTs also all unremarkable, save for a mildly low potassium at 3.3.  TSH was elevated to 17.4.  ABG was negative.  The hospital at [facility] had no beds and so she was sent to [facility] for further evaluation.      I did review the records from her hospitalization at [facility] for her meningioma resection.  It sounds like she has had a meningioma diagnosed since 2012, but it had been growing.  When she initially presented to [facility] in 03/2017 after a fall she had a subarachnoid hemorrhage.  She was scheduled for surgery late September but fell on the day of her surgery and again, had a subarachnoid hemorrhage, though they went ahead with evacuation of this and the meningioma resection.  Postoperatively, she had some issues with cognitive deficits, hallucinations potentially and was ultimately discharged to rehab.  There is cognitive deficits listed as one of her medical issues, though it is unclear exactly how severe these might be.  The patient has been getting home care per all the documentation in Care Everywhere, though we cannot see any actual records of the home care visits.      PAST MEDICAL HISTORY:   1.  Hypothyroidism:  The patient has previously had a thyroid resection due to multinodular goiter.   2.  Dyslipidemia.   3.  Sacral fracture.   4.  Bradycardia.   5.  Meningioma, status post resection in 09/2017.   6.  Subarachnoid hemorrhage following a fall in 03/2017 and again in 09/2017.   7.  Peripheral neuropathy.   8.  Anxiety and depression.   9.  Insomnia.   10.  Osteopenia.   11.  Traumatic brain injury.      MEDICATIONS:    Prior to Admission medications    Medication Sig Last Dose Taking? Auth Provider   gabapentin (NEURONTIN) 300 MG capsule Take 600 mg by mouth 3 times daily 1/19/2018 Yes Unknown, Entered By  History   simvastatin (ZOCOR) 20 MG tablet Take 20 mg by mouth At Bedtime 1/19/2018 Yes Unknown, Entered By History   levothyroxine (SYNTHROID/LEVOTHROID) 88 MCG tablet Take 88 mcg by mouth daily 1/19/2018 Yes Unknown, Entered By History   Calcium Carb-Cholecalciferol (CALCIUM-VITAMIN D) 600-400 MG-UNIT TABS Take 1 tablet by mouth 2 times daily 1/19/2018 Yes Unknown, Entered By History   FLUoxetine 20 MG tablet Take 20 mg by mouth every morning 1/19/2018 Yes Unknown, Entered By History   levETIRAcetam (KEPPRA) 500 MG tablet Take 500 mg by mouth 2 times daily Few weeks ago at Unknown time  Unknown, Entered By History           SOCIAL HISTORY:  The patient denies any use of tobacco or alcohol.      FAMILY HISTORY:  Mother had lung cancer.      ALLERGIES:  PENICILLIN AND CONTRAST DYE.      REVIEW OF SYSTEMS:  The complete review of systems reviewed and negative, save for the pertinent positives recorded in the HPI.      PHYSICAL EXAMINATION:   VITAL SIGNS:  Show a blood pressure of 131/65, heart rate of 60, respirations 18, satting 94% on room air with a temperature of 98.8 degrees Fahrenheit.   GENERAL:  The patient is lying in bed, smelling of urine but appears comfortable.   HEENT:  Pupils equal, round, reactive to light, extraocular muscle function intact.  No scleral icterus.  Oropharynx is clear.   NECK:  No lymphadenopathy or thyromegaly.   CARDIOVASCULAR:  Heart is regular rate and rhythm without any murmur, rub or gallop.   PULMONARY:  Lungs are clear to auscultation bilaterally without wheeze or rhonchi.   GASTROINTESTINAL:  Positive bowel sounds, soft, nontender and nondistended.   SKIN:  No rash or lesion.   LYMPHATICS:  No peripheral edema.   PSYCHIATRIC:  The patient is alert and oriented x 3.  She was slightly off in the year, 2016, but otherwise got everything else right, in terms of date.   NEUROLOGIC:  Cranial nerves II-XII are grossly intact.  She has good muscle strength bilaterally.  No focal  deficits noted.  Good coordination.      LABORATORY DATA:  CBC, BMP, LFTs unremarkable, save for a potassium of 3.3.  ABG unremarkable.  TSH is 17.43 with a free T4 of 1.05.  UA has 2 WBCs, 4 RBCs, small blood, but negative nitrites and leuk/esterase.      IMAGING:  Head CT shows chronic postoperative change with no acute findings.      ASSESSMENT AND PLAN:  Ms. Arias is a female with a past medical history significant for meningioma resection in 09/2017, hypothyroidism, dyslipidemia, cognitive deficits, subarachnoid hemorrhage and peripheral neuropathy, who presents to the Emergency Department at [facility] with concerns for confusion.   1.  Mild confusion with a history of cognitive deficits:  It is difficult for me to assess at this point how confused the patient is, as she seems to be doing fairly well and answering questions appropriately.  She does not have any signs of infection, no new acute issues, per her report and no neuro deficits.  I note she is on Keppra following her surgery and unclear if she is still on this but does not seem postictal and has not had any reported seizure activity.  Synthroid could be adjusted, but it is certainly not low enough that this though would be causing significant confusion.  Ativan and hydroxyzine certainly could be causing some issues if she is taking these though again, this is unclear.  We will have OT assess her for cognitive deficits as well as PT to ensure she is safe to go home or any other additional resources that may be needed.  Social work to consult and help with disposition plan.  Could always consider a neurology or psych consult, but I do not know how much utility that would be at this point.   2.  Status post meningioma resection:  Head CT shows what appears to be chronic  postoperative change.  Could always consider a neurosurgery consult to look at the images, but the report does not look that impressive.   3.  Hypothyroidism:  TSH is elevated  to 17.  Once we know her dose, I will increase this slightly and have her recheck with her PCP in 3-4 weeks.   4.  Mild hypokalemia:  Replace per protocol.   5.  Depression with anxiety:  Continue with SSRI, once confirmed.   6.  Back pain:  This is chronic.  I will have her get up and walking with nursing and therapies.  If she has a significantly worse pain, could consider spine imaging.   7.  Bradycardia:  Monitor for any hypotension or symptoms of bradycardia.   8.  Peripheral neuropathy.   9.  Disposition:  She will be under observation status for now, as I do not really see anything acute at this point that would make her inpatient.         CHANDRA PRETTY DO             D: 2018   T: 2018   MT: MEE      Name:     RUTHANN FRANCIS   MRN:      [id number]        Account:      [id number]   :      1950        Admitted:     2018                   Document: [id number]

## 2018-01-23 NOTE — IP AVS SNAPSHOT
[place] [address] MEDICAL SPECIALTY UNIT: [phone]                                              INTERAGENCY TRANSFER FORM - PHYSICIAN ORDERS   2018                    Hospital Admission Date: 2018  RUTHANN FRANCIS   : 1950  Sex: Female        Attending Provider: Bassam Arana DO     Allergies:  Penicillins, Contrast Dye    Infection:  None   Service:  INTERNAL MED    Ht:  --   Wt:  58.5 kg (128 lb 15.5 oz)   Admission Wt:  58.5 kg (128 lb 15.5 oz)    BMI:  --   BSA:  --            Patient PCP Information     Provider PCP Type    [facility]      ED Clinical Impression     Diagnosis Description Comment Added By Time Added    Confusion [R41.0] Confusion [R41.0]  Coco Jacob APRN CNP 2018 11:24 AM    History of resection of meningioma [Z98.890] History of resection of meningioma [Z98.890]  Coco Jacob APRN CNP 2018 11:24 AM    History of subarachnoid hemorrhage [Z86.79] History of subarachnoid hemorrhage [Z86.79]  Coco Jacob APRN CNP 2018 11:24 AM    Peripheral polyneuropathy [G62.9] Peripheral polyneuropathy [G62.9]  Coco Jacob APRN CNP 2018 11:24 AM    Acquired hypothyroidism [E03.9] Acquired hypothyroidism [E03.9]  Coco Jacob APRN CNP 2018 11:25 AM      Hospital Problems as of 2018              Priority Class Noted POA    Confusion Medium  2018 Yes      Non-Hospital Problems as of 2018              Priority Class Noted    Multinodular goiter Medium  2012    S/P thyroidectomy Medium  2012    Sacral fracture (H) Medium  2014      Code Status History     Date Active Date Inactive Code Status Order ID Comments User Context    2018 12:34 PM  DNR/DNI [id number]  Rai Melgoza MD Outpatient    2018  6:08 AM 2018 12:34 PM DNR/DNI [id number]  Bassam Arana DO Inpatient    2014  7:27 PM 2014  2:47 PM Full Code [id number]   Cortney Mederos RN Inpatient         Medication Review      CONTINUE these medications which may have CHANGED, or have new prescriptions. If we are uncertain of the size of tablets/capsules you have at home, strength may be listed as something that might have changed.        Dose / Directions Comments    levothyroxine 100 MCG tablet   Commonly known as:  SYNTHROID/LEVOTHROID   This may have changed:    - medication strength  - how much to take   Used for:  Acquired hypothyroidism        Dose:  100 mcg   Start taking on:  1/25/2018   Take 1 tablet (100 mcg) by mouth daily   Quantity:  30 tablet   Refills:  3          CONTINUE these medications which have NOT CHANGED        Dose / Directions Comments    Calcium-Vitamin D 600-400 MG-UNIT Tabs        Dose:  1 tablet   Take 1 tablet by mouth 2 times daily   Refills:  0        FLUoxetine 20 MG tablet        Dose:  20 mg   Take 20 mg by mouth every morning   Refills:  0        gabapentin 300 MG capsule   Commonly known as:  NEURONTIN        Dose:  600 mg   Take 600 mg by mouth 3 times daily   Refills:  0        KEPPRA 500 MG tablet   Generic drug:  levETIRAcetam        Dose:  500 mg   Take 500 mg by mouth 2 times daily   Refills:  0        simvastatin 20 MG tablet   Commonly known as:  ZOCOR        Dose:  20 mg   Take 20 mg by mouth At Bedtime   Refills:  0                  Further instructions from your care team       [facility] phone, [phone]    Summary of Visit     Reason for your hospital stay       You presented with confusion. No clear source of your confusion was found other than an elevated TSH which we will attempt to correct by increasing your Synthroid dose.             After Care     Activity - Up with nursing assistance           Advance Diet as Tolerated       Follow this diet upon discharge: Orders Placed This Encounter        Regular Diet Adult       Daily weights       Call Provider for weight gain of more than 2 pounds per day or 5 pounds  per week.       Fall precautions           General info for SNF       Length of Stay Estimate: Short Term Care: Estimated # of Days <30  Condition at Discharge: Improving  Level of care:skilled   Rehabilitation Potential: Good  Admission H&P remains valid and up-to-date: Yes  Recent Chemotherapy: N/A  Use Nursing Home Standing Orders: Yes       Intake and output       Every shift       Mantoux instructions       Give two-step Mantoux (PPD) Per Facility Policy Yes             Referrals     Occupational Therapy Adult Consult       Evaluate and treat as clinically indicated.    Reason:  Deconditioning and Cognitive impairment.       Physical Therapy Adult Consult       Evaluate and treat as clinically indicated.    Reason:  Deconditioning             Follow-Up Appointment Instructions     Future Labs/Procedures    Follow Up and recommended labs and tests     Comments:    Follow up with alf physician.  The following labs/tests are recommended: Will need to follow up with a repeat Keppra level in 1 week. This was subtherapeutic on admission and suspect non-compliance.     - Will need to ensure follow up with her pcp in 4-6 weeks to re-check her TFT's to ensure TSH is down trending.      Follow-Up Appointment Instructions     Follow Up and recommended labs and tests       Follow up with alf physician.  The following labs/tests are recommended: Will need to follow up with a repeat Keppra level in 1 week. This was subtherapeutic on admission and suspect non-compliance.     - Will need to ensure follow up with her pcp in 4-6 weeks to re-check her TFT's to ensure TSH is down trending.             Statement of Approval     Ordered          01/24/18 1235  I have reviewed and agree with all the recommendations and orders detailed in this document.  EFFECTIVE NOW     Approved and electronically signed by:  Rai Melgoza MD

## 2018-01-23 NOTE — IP AVS SNAPSHOT
[place] [address] MEDICAL SPECIALTY UNIT: [phone]                                              INTERAGENCY TRANSFER FORM - LAB / IMAGING / EKG / EMG RESULTS   2018                    Hospital Admission Date: 2018  RUTHANN FRANCIS   : 1950  Sex: Female        Attending Provider: Bassam Arana DO     Allergies:  Penicillins, Contrast Dye    Infection:  None   Service:  INTERNAL MED    Ht:  --   Wt:  58.5 kg (128 lb 15.5 oz)   Admission Wt:  58.5 kg (128 lb 15.5 oz)    BMI:  --   BSA:  --            Patient PCP Information     Provider PCP Type    [facility]         Lab Results - 3 Days      Keppra (Levetiracetam) Level [[id number]] (Abnormal)  Resulted: 18 1518, Result status: Final result    Ordering provider: Rubén Flores DO  18 1026 Resulting lab: [facility]    Specimen Information    Type Source Collected On   Blood  18 0910          Components       Value Reference Range Flag Lab   Keppra (Levetiracetam) Level <2 12 - 46 ug/mL L FrStHsLb   Comment:         (Note)  INTERPRETIVE INFORMATION: Keppra (Levetiracetam)  Therapeutic Range:  12-46 ug/mL             Toxic:  Not well Established  Pharmacokinetics of levetiracetam are affected by renal   function. Adverse effects may include somnolence, weakness,   headache and vomiting.  Performed by [facility],  [address] [place] [id number] [phone]  [email or web address], Tim Cook MD, Lab. Director              Folate [[id number]]  Resulted: 18 1513, Result status: Final result    Ordering provider: Rubén Flores DO  18 1036 Resulting lab: [facility]    Specimen Information    Type Source Collected On   Blood  18 0910          Components       Value Reference Range Flag Lab   Folate 16.2 >5.4 ng/mL  51            Vitamin B12 [[id number]] (Abnormal)  Resulted: 18 1437, Result status: Final  result    Ordering provider: Rubén Flores, DO  01/23/18 1036 Resulting lab: [facility]    Specimen Information    Type Source Collected On   Blood  01/23/18 0910          Components       Value Reference Range Flag Lab   Vitamin B12 1704 193 - 986 pg/mL H 51            Potassium [[id number]]  Resulted: 01/23/18 0937, Result status: Final result    Ordering provider: Bassam Arana,   01/23/18 0853 Resulting lab: [facility]    Specimen Information    Type Source Collected On   Blood  01/23/18 0910          Components       Value Reference Range Flag Lab   Potassium 3.7 3.4 - 5.3 mmol/L  FrStHsLb            CK total [[id number]]  Resulted: 01/23/18 0406, Result status: Final result    Ordering provider: Mark Steward MD  01/22/18 2257 Resulting lab: [facility]    Specimen Information    Type Source Collected On     01/22/18 2257          Components       Value Reference Range Flag Lab   CK Total 192 30 - 225 U/L  FrRdHs            UA with Microscopic [[id number]] (Abnormal)  Resulted: 01/23/18 0206, Result status: Final result    Ordering provider: Mark Steward MD  01/23/18 0146 Resulting lab: [facility]    Specimen Information    Type Source Collected On   Catheterized Urine Urine catheter 01/23/18 0142          Components       Value Reference Range Flag Lab   Color Urine Yellow   FrRdHs   Appearance Urine Clear   FrRdHs   Glucose Urine Negative NEG^Negative mg/dL  FrRdHs   Bilirubin Urine Negative NEG^Negative  FrRdHs   Ketones Urine 80 NEG^Negative mg/dL A FrRd   Specific Gravity Urine 1.024 1.003 - 1.035  FrRdHs   Blood Urine Small NEG^Negative A FrRdHs   pH Urine 5.0 5.0 - 7.0 pH  FrRdHs   Protein Albumin Urine Negative NEG^Negative mg/dL  FrRdHs   Urobilinogen mg/dL 2.0 0.0 - 2.0 mg/dL  FrRdHs   Nitrite Urine Negative NEG^Negative  FrRdHs   Leukocyte Esterase Urine Negative NEG^Negative  FrRdHs   Source  Catheterized Urine   FrRdHs   WBC Urine 2 0 - 2 /HPF  FrRdHs   RBC Urine 4 0 - 2 /HPF H FrRdHs   Mucous Urine Present NEG^Negative /LPF A FrRdHs            Drug abuse screen 77 urine [[id number]]  Resulted: 01/23/18 0124, Result status: Final result    Ordering provider: Mark Steward MD  01/22/18 2252 Resulting lab: [facility]    Specimen Information    Type Source Collected On   Urine Urine clean catch 01/23/18 0057          Components       Value Reference Range Flag Lab   Amphetamine Qual Urine Negative NEG^Negative  FrRdHs   Comment:  Cutoff for a negative amphetamine is 500 ng/mL or less.   Barbiturates Qual Urine Negative NEG^Negative  FrRdHs   Comment:  Cutoff for a negative barbiturate is 200 ng/mL or less.   Benzodiazepine Qual Urine Negative NEG^Negative  FrRdHs   Comment:  Cutoff for a negative benzodiazepine is 200 ng/mL or less.   Cannabinoids Qual Urine Negative NEG^Negative  FrRdHs   Comment:  Cutoff for a negative cannabinoid is 50 ng/mL or less.   Cocaine Qual Urine Negative NEG^Negative  FrRdHs   Comment:  Cutoff for a negative cocaine is 300 ng/mL or less.   Opiates Qualitative Urine Negative NEG^Negative  FrRdHs   Comment:  Cutoff for a negative opiate is 300 ng/mL or less.   PCP Qual Urine Negative NEG^Negative  FrRdHs   Comment:  Cutoff for a negative PCP is 25 ng/mL or less.            UA with Microscopic [[id number]] (Abnormal)  Resulted: 01/23/18 0117, Result status: Final result    Ordering provider: Mark Steward MD  01/22/18 2252 Resulting lab: [facility]    Specimen Information    Type Source Collected On   Midstream Urine Urine clean catch 01/23/18 0057          Components       Value Reference Range Flag Lab   Color Urine Yellow   FrRdHs   Appearance Urine Cloudy   FrRdHs   Glucose Urine Negative NEG^Negative mg/dL  FrRdHs   Bilirubin Urine Negative NEG^Negative  FrRdHs   Ketones Urine 80 NEG^Negative mg/dL A FrRdHs   Specific Gravity Urine  1.027 1.003 - 1.035  FrRdHs   Blood Urine Moderate NEG^Negative A FrRdHs   pH Urine 5.0 5.0 - 7.0 pH  FrRdHs   Protein Albumin Urine 30 NEG^Negative mg/dL A FrRdHs   Urobilinogen mg/dL 4.0 0.0 - 2.0 mg/dL H FrRdHs   Nitrite Urine Negative NEG^Negative  FrRdHs   Leukocyte Esterase Urine Trace NEG^Negative A FrRdHs   Source Midstream Urine   FrRdHs   WBC Urine 33 0 - 2 /HPF H FrRdHs   RBC Urine 7 0 - 2 /HPF H FrRdHs   Squamous Epithelial /HPF Urine 14 0 - 1 /HPF H FrRdHs   Mucous Urine Present NEG^Negative /LPF A FrRdHs            T4 free [[id number]]  Resulted: 01/22/18 2349, Result status: Final result    Ordering provider: Mark Steward MD  01/22/18 2257 Resulting lab: [facility]    Specimen Information    Type Source Collected On     01/22/18 2257          Components       Value Reference Range Flag Lab   T4 Free 1.05 0.76 - 1.46 ng/dL  FrRdHs            TSH with free T4 reflex [[id number]] (Abnormal)  Resulted: 01/22/18 2336, Result status: Final result    Ordering provider: Mark Steward MD  01/22/18 2252 Resulting lab: [facility]    Specimen Information    Type Source Collected On   Blood  01/22/18 2257          Components       Value Reference Range Flag Lab   TSH 17.43 0.40 - 4.00 mU/L H FrRdHs            Comprehensive metabolic panel [[id number]] (Abnormal)  Resulted: 01/22/18 2332, Result status: Final result    Ordering provider: Mark Steward MD  01/22/18 2252 Resulting lab: [facility]    Specimen Information    Type Source Collected On   Blood  01/22/18 2257          Components       Value Reference Range Flag Lab   Sodium 141 133 - 144 mmol/L  FrRdHs   Potassium 3.3 3.4 - 5.3 mmol/L L FrRdHs   Chloride 104 94 - 109 mmol/L  FrRdHs   Carbon Dioxide 25 20 - 32 mmol/L  FrRdHs   Anion Gap 12 3 - 14 mmol/L  FrRdHs   Glucose 104 70 - 99 mg/dL H FrRdHs   Urea Nitrogen 24 7 - 30 mg/dL  FrRdHs   Creatinine 0.80 0.52 - 1.04 mg/dL  [facility]   GFR Estimate  72 >60 mL/min/1.7m2  FrRdHs   Comment:  Non  GFR Calc   GFR Estimate If Black 87 >60 mL/min/1.7m2  FrRdHs   Comment:  African American GFR Calc   Calcium 8.8 8.5 - 10.1 mg/dL  FrRdHs   Bilirubin Total 0.5 0.2 - 1.3 mg/dL  FrRdHs   Albumin 3.3 3.4 - 5.0 g/dL L FrRdHs   Protein Total 7.3 6.8 - 8.8 g/dL  FrRdHs   Alkaline Phosphatase 95 40 - 150 U/L  FrRdHs   ALT 16 0 - 50 U/L  FrRdHs   AST 18 0 - 45 U/L  FrRdHs            Troponin I [[id number]]  Resulted: 01/22/18 2332, Result status: Final result    Ordering provider: Mark Steward MD  01/22/18 2252 Resulting lab: [facility]    Specimen Information    Type Source Collected On   Blood  01/22/18 2257          Components       Value Reference Range Flag Lab   Troponin I ES <0.015 0.000 - 0.045 ug/L  FrRd   Comment:         The 99th percentile for upper reference range is 0.045 ug/L.  Troponin values   in the range of 0.045 - 0.120 ug/L may be associated with risks of adverse   clinical events.              Magnesium [[id number]]  Resulted: 01/22/18 2332, Result status: Final result    Ordering provider: Mark Steward MD  01/22/18 2252 Resulting lab: [facility]    Specimen Information    Type Source Collected On   Blood  01/22/18 2257          Components       Value Reference Range Flag Lab   Magnesium 2.0 1.6 - 2.3 mg/dL  FrRdHs            Alcohol level blood [[id number]]  Resulted: 01/22/18 2332, Result status: Final result    Ordering provider: Mark Steward MD  01/22/18 2252 Resulting lab: [facility]    Specimen Information    Type Source Collected On   Blood  01/22/18 2257          Components       Value Reference Range Flag Lab   Ethanol g/dL <0.01 <0.01 g/dL  FrRdHs            Ammonia (on ice) [[id number]]  Resulted: 01/22/18 2324, Result status: Final result    Ordering provider: Mark Steward MD  01/22/18 2252 Resulting lab: [facility]    Specimen Information     Type Source Collected On   Blood  01/22/18 2257          Components       Value Reference Range Flag Lab   Ammonia 18 10 - 50 umol/L  FrRdHs            Blood gas venous and oxyhgb [[id number]]  Resulted: 01/22/18 2312, Result status: Final result    Ordering provider: Mark Steward MD  01/22/18 2252 Resulting lab: [facility]    Specimen Information    Type Source Collected On   Blood  01/22/18 2300          Components       Value Reference Range Flag Lab   Ph Venous 7.36 7.32 - 7.43 pH  FrRdHs   PCO2 Venous 47 40 - 50 mm Hg  FrRdHs   PO2 Venous 25 25 - 47 mm Hg  FrRdHs   Bicarbonate Venous 26 21 - 28 mmol/L  FrRdHs   FIO2 Room Air   FrRdHs   Oxyhemoglobin Venous 41 %  FrRdHs   Base Excess Venous 0.2 mmol/L  FrRdHs   Comment:  Reference range:  -7.7 to 1.9            CBC + differential [[id number]] (Abnormal)  Resulted: 01/22/18 2310, Result status: Final result    Ordering provider: Mark Steward MD  01/22/18 2252 Resulting lab: [facility]    Specimen Information    Type Source Collected On   Blood  01/22/18 2257          Components       Value Reference Range Flag Lab   WBC 9.9 4.0 - 11.0 10e9/L  FrRdHs   RBC Count 5.42 3.8 - 5.2 10e12/L H FrRdHs   Hemoglobin 15.7 11.7 - 15.7 g/dL  FrRdHs   Hematocrit 48.9 35.0 - 47.0 % H FrRdHs   MCV 90 78 - 100 fl  FrRdHs   MCH 29.0 26.5 - 33.0 pg  FrRdHs   MCHC 32.1 31.5 - 36.5 g/dL  FrRdHs   RDW 13.4 10.0 - 15.0 %  FrRdHs   Platelet Count 281 150 - 450 10e9/L  FrRdHs   Diff Method Automated Method   FrRdHs   % Neutrophils 56.0 %  FrRdHs   % Lymphocytes 36.4 %  FrRdHs   % Monocytes 6.4 %  FrRdHs   % Eosinophils 0.4 %  FrRdHs   % Basophils 0.3 %  FrRdHs   % Immature Granulocytes 0.5 %  FrRdHs   Nucleated RBCs 0 0 /100  FrRdHs   Absolute Neutrophil 5.5 1.6 - 8.3 10e9/L  FrRdHs   Absolute Lymphocytes 3.6 0.8 - 5.3 10e9/L  FrRdHs   Absolute Monocytes 0.6 0.0 - 1.3 10e9/L  FrRdHs   Absolute Eosinophils 0.0 0.0 - 0.7 10e9/L  FrRdHs  "  Absolute Basophils 0.0 0.0 - 0.2 10e9/L  FrRdHs   Abs Immature Granulocytes 0.1 0 - 0.4 10e9/L  FrRdHs   Absolute Nucleated RBC 0.0   FrRdHs            Testing Performed By     Lab - Abbreviation Name Director Address Valid Date Range    12 - FrRdHs [facility] Unknown [address] [place] [address] 05/08/15 1057 - Present    14 - FrStHsLb [facility] Unknown [address] [place] [address] 05/08/15 1057 - Present    51 - Unknown [facility] Unknown [address] [facility] [address] 12/31/14 1010 - Present            Unresulted Labs (24h ago through future)    Start       Ordered    Unscheduled  Potassium  (Potassium Replacement - \"Standard\" - For K levels less than 3.4 mmol/L - UU,UR,UA,RH,SH,PH,WY )  CONDITIONAL (SPECIFY),   Routine     Comments:  Obtain Potassium Level for these conditions:  *IF no potassium result within 24 hours before initiation of order set, draw potassium level with next lab collect.    *2 HOURS AFTER last IV potassium replacement dose and 4 hours after an oral replacement dose.  *Next morning after potassium dose.     Repeat Potassium Replacement if necessary.    01/23/18 0610         Imaging Results - 3 Days      Head CT w/o contrast [[id number]]  Resulted: 01/23/18 0026, Result status: Final result    Ordering provider: Mark Steward MD  01/22/18 225 Resulted by: Lance Mack MD    Performed: 01/22/18 2316 - 01/22/18 2323 Resulting lab: RADIOLOGY RESULTS    Narrative:       CT HEAD W/O CONTRAST  1/22/2018 11:23 PM     HISTORY: AMS.    TECHNIQUE: Axial images of the head and coronal reformations without  IV contrast material. Radiation dose for this scan was reduced using  automated exposure control, adjustment of the mA and/or kV according  to patient size, or iterative reconstruction technique.    COMPARISON: 5/30/2017.    FINDINGS: Interval right frontal craniotomy with resection of " the  previously seen partially calcified mass along the anterior falx which  was likely a meningioma. Just beneath the craniotomy site there is a  small amount of linear high density along the dura which is likely  postoperative change or dural calcification. Some minimal residual  subdural blood in this region is difficult to exclude but this likely  represents chronic, postoperative change. This measures up to 0.4 cm  in thickness. No associated mass effect. Otherwise no intracranial  hemorrhage. No acute infarct identified. Low attenuation areas are  present in white matter of the cerebral hemispheres that are  nonspecific but consistent with chronic small vessel ischemic changes  in this age patient.      Impression:       IMPRESSION:  1. No acute findings.  2. Interval right frontal craniotomy with resection of the previously  seen mass along the anterior falx.  3. Thin linear hyperdensity beneath the craniotomy site likely  represents some dural thickening/calcification and potentially some  minimal residual subdural blood. This is likely chronic, postoperative  change. Comparison with more recent postoperative study would be  helpful to confirm this.    Findings discussed with the ER physician at 11:40 p.m.    KACI HOLDEN MD      Testing Performed By     Lab - Abbreviation Name Director Address Valid Date Range    104 - Rad Rslts RADIOLOGY RESULTS Unknown Unknown 02/16/05 1553 - Present            Encounter-Level Documents:     There are no encounter-level documents.      Order-Level Documents:     There are no order-level documents.

## 2018-01-24 ENCOUNTER — APPOINTMENT (OUTPATIENT)
Dept: OCCUPATIONAL THERAPY | Facility: CLINIC | Age: 68
End: 2018-01-24
Attending: INTERNAL MEDICINE
Payer: MEDICARE

## 2018-01-24 VITALS
BODY MASS INDEX: 20.2 KG/M2 | TEMPERATURE: 98.8 F | WEIGHT: 128.97 LBS | RESPIRATION RATE: 16 BRPM | OXYGEN SATURATION: 95 % | DIASTOLIC BLOOD PRESSURE: 52 MMHG | SYSTOLIC BLOOD PRESSURE: 118 MMHG | HEART RATE: 60 BPM

## 2018-01-24 LAB — LEVETIRACETAM SERPL-MCNC: <2 UG/ML (ref 12–46)

## 2018-01-24 PROCEDURE — 97535 SELF CARE MNGMENT TRAINING: CPT | Mod: GO

## 2018-01-24 PROCEDURE — 97166 OT EVAL MOD COMPLEX 45 MIN: CPT | Mod: GO

## 2018-01-24 PROCEDURE — G8989 SELF CARE D/C STATUS: HCPCS | Mod: GO,CJ

## 2018-01-24 PROCEDURE — 99217 ZZC OBSERVATION CARE DISCHARGE: CPT | Performed by: INTERNAL MEDICINE

## 2018-01-24 PROCEDURE — 25000132 ZZH RX MED GY IP 250 OP 250 PS 637: Mod: GY | Performed by: INTERNAL MEDICINE

## 2018-01-24 PROCEDURE — G0378 HOSPITAL OBSERVATION PER HR: HCPCS

## 2018-01-24 PROCEDURE — A9270 NON-COVERED ITEM OR SERVICE: HCPCS | Mod: GY | Performed by: INTERNAL MEDICINE

## 2018-01-24 PROCEDURE — G8987 SELF CARE CURRENT STATUS: HCPCS | Mod: GO,CJ

## 2018-01-24 PROCEDURE — 40000133 ZZH STATISTIC OT WARD VISIT

## 2018-01-24 PROCEDURE — 99207 ZZC CDG-CODE CATEGORY CHANGED: CPT | Performed by: INTERNAL MEDICINE

## 2018-01-24 PROCEDURE — G8988 SELF CARE GOAL STATUS: HCPCS | Mod: GO,CJ

## 2018-01-24 RX ORDER — LEVOTHYROXINE SODIUM 100 UG/1
100 TABLET ORAL DAILY
Qty: 30 TABLET | Refills: 3 | DISCHARGE
Start: 2018-01-25 | End: 2018-03-28 | Stop reason: DRUGHIGH

## 2018-01-24 RX ADMIN — GABAPENTIN 600 MG: 300 CAPSULE ORAL at 16:07

## 2018-01-24 RX ADMIN — FLUOXETINE 20 MG: 20 CAPSULE ORAL at 08:01

## 2018-01-24 RX ADMIN — LEVETIRACETAM 500 MG: 500 TABLET, FILM COATED ORAL at 08:01

## 2018-01-24 RX ADMIN — LEVOTHYROXINE SODIUM 100 MCG: 100 TABLET ORAL at 08:01

## 2018-01-24 RX ADMIN — GABAPENTIN 600 MG: 300 CAPSULE ORAL at 08:01

## 2018-01-24 NOTE — PROGRESS NOTES
" 01/24/18 1032   Quick Adds   Type of Visit Initial Occupational Therapy Evaluation   Living Environment   Lives With alone   Living Arrangements apartment   Living Environment Comment Pt reports living in apt, on 2nd floor with no elevator access. Pt reports no longer driving (since Oct) d/t MD telling her to stop. Pt reports having house keeper run errands. House keeper 1x every other week. Pt reports also having another provider to take her to appointments. Pt has standard toiltes with grab bars and vanity, tub/shower with shower chair and grab bars.    Self-Care   Usual Activity Tolerance moderate   Activity/Exercise/Self-Care Comment Pt reports sleeping a lot and watching TV. Pt uses [employer] in apt.    Functional Level Prior   Ambulation 1-->assistive equipment   Transferring 0-->independent   Toileting 0-->independent   Bathing 1-->assistive equipment   Dressing 0-->independent   Eating 0-->independent   Communication 0-->understands/communicates without difficulty   Swallowing 0-->swallows foods/liquids without difficulty   Cognition 1 - attention or memory deficits   Fall history within last six months yes   Number of times patient has fallen within last six months (\" I cant even count\" )   Prior Functional Level Comment Pt reports IND with dressing, toileting, bathing, making microwave meals, medication management (reports unable to complete safely), finances. Pt has A with cleaning and laundry. Pt reports multiple falls due to LE weakness and buckling. Pt reports hitting head, bruising UE and LE.    General Information   Onset of Illness/Injury or Date of Surgery - Date 01/23/18   Referring Physician Sumit   Patient/Family Goals Statement \"im not going to a crazy house\"   Additional Occupational Profile Info/Pertinent History of Current Problem Pt is a 67-year-old female with a past medical history significant for meningioma, status post resection end of 2017, hypothyroidism, dyslipidemia, peripheral " neuropathy, anxiety, previous TBI and reported cognitive defects. Pt admitted after reported confusion and fall and incontient.    Precautions/Limitations fall precautions   General Observations Pt agreeable to OT.    Cognitive Status Examination   Orientation person;place  (Month, day of week, date but not year. )   Level of Consciousness alert   Able to Follow Commands mild impairment   Personal Safety (Cognitive) decreased awareness, need for assist;decreased awareness, need for safety   Memory impaired   Attention Distractible during evaluation   Cognitive Comment Pt knows current President, reports prior President is Ignacio. Pt reports coming to hospital due to fall. Reports on phone with friend Soham who was attempting to bring over meal- reports he called Police and stated that she was confused. Presents with impaired problem solving, attention to task, following directions. .    Visual Perception   Visual Perception Wears glasses   Sensory Examination   Sensory Comments B LE numbness at baseline   Pain Assessment   Patient Currently in Pain No   Range of Motion (ROM)   ROM Quick Adds No deficits were identified   Mobility   Bed Mobility Comments MOD I with use of bd rail   Transfer Skill: Bed to Chair/Chair to Bed   Level of [place]: Bed to Chair contact guard   Physical Assist/Nonphysical Assist: Bed to Chair supervision   Assistive Device - Transfer Skill Bed to Chair Chair to Bed Rehab Eval rolling walker   Transfer Skill: Sit to Stand   Level of [place]: Sit/Stand stand-by assist   Physical Assist/Nonphysical Assist: Sit/Stand supervision   Assistive Device for Transfer: Sit/Stand rolling walker   Lower Body Dressing   Level of [place]: Dress Lower Body stand-by assist   Grooming   Level of [place]: Grooming stand-by assist   Activities of Daily Living Analysis   Impairments Contributing to Impaired Activities of Daily Living cognition impaired;balance impaired   Clinical Impression  "  Criteria for Skilled Therapeutic Interventions Met yes, treatment indicated   OT Diagnosis Impaired ADLs/IADLs and functional mobility   Influenced by the following impairments Impaired cognition/safety, impaired balance with hx of multiple falls   Assessment of Occupational Performance 3-5 Performance Deficits   Identified Performance Deficits medication management, financial management, ADLs, functional mobility   Clinical Decision Making (Complexity) Moderate complexity   Predicted Duration of Therapy Intervention (days/wks) 1x   Anticipated Discharge Disposition Transitional Care Facility   Risks and Benefits of Treatment have been explained. Yes   Patient, Family & other staff in agreement with plan of care Yes   [facility] TM \"6 Clicks\"   2016, Trustees of [facility], under license to [employer].  All rights reserved.   6 Clicks Short Forms Daily Activity Inpatient Short Form   [facility]  \"6 Clicks\" Daily Activity Inpatient Short Form   1. Putting on and taking off regular lower body clothing? 3 - A Little   2. Bathing (including washing, rinsing, drying)? 3 - A Little   3. Toileting, which includes using toilet, bedpan or urinal? 3 - A Little   4. Putting on and taking off regular upper body clothing? 4 - None   5. Taking care of personal grooming such as brushing teeth? 3 - A Little   6. Eating meals? 4 - None   Daily Activity Raw Score (Score out of 24.Lower scores equate to lower levels of function) 20   Total Evaluation Time   Total Evaluation Time (Minutes) 15     "

## 2018-01-24 NOTE — PLAN OF CARE
Problem: Patient Care Overview  Goal: Plan of Care/Patient Progress Review  Outcome: Improving  A&Ox2-3, calm and coop, calls approp. Up with assist of 1 to BSC and chair, needs enc to get OOB. VSS. Denies pain. Jose diet, good appetite. Occ inc urine. OT saw pt. DC to TCU today, SW involved, pt agrees with plan.

## 2018-01-24 NOTE — PROGRESS NOTES
MD Notification    Notified Person:     Notified Persons Name:Coco Jacob APRN CNP    Notification Date/Time:1/24/2018, 3:54 PM    Notification Interaction:  Talked with Physician via phone    Purpose of Notification:keppra level <2    Orders Received: orders to d/c on current plan BID 500mg and recheck level in 1 month    Comments: paged with that info

## 2018-01-24 NOTE — PROGRESS NOTES
Care Transition Initial Assessment - RELL  Reason For Consult: discharge planning  Met with: Patient   Active Problems:    Confusion         DATA  Lives With: alone  Living Arrangements: apartment       Identified issues/concerns regarding health management: SW consult noted for discharge planning. Patient is a 67 year old female here with OBS status and is expected to be ready for D/C today. Met with patient. She was living alone and getting services which included housekeeping every other week, I meal per day and med set up. She has Medicare/Medicaid coverage. Patient has not yet seen therapies but she believes she is weak. Confusion is noted by RNs. Patient agrees to a referral to [facility] and other [place] facilities. Called [facility] and spoke with Bryan. He will assess patient, so a referral was sent via the [employer] process. Also called Susan at [facility] and sent a referral via [employer] process.      ASSESSMENT  Cognitive Status: Patient was alert, answered questions and was able to state her wishes  Concerns to be addressed: SW will follow for D/C planning     PLAN  Financial costs for the patient includes: TBD. Hopefully she has MA coverage for a TCU stay and transport.  Patient given options and choices for discharge: Yes  Patient/family is agreeable to the plan? Yes  Patient Goals and Preferences: TCU  Patient anticipates discharging to: TCU    Addendum  Patient was accepted at [facility], per Susan in Admissions. She has MA coverage, she reports and would need therapies ordered.   PAS-RR  Per DHS regulation, RELL completed and submitted PAS-RR to [place] Board on Aging Direct Connect via the Senior LinkAge Line.  PAS-RR confirmation # is : [id number]  Further questions may be directed to [facility] LinkAge Line at #[phone], option #4 for PAS-RR staff.  Update  MD has completed discharge orders which have been faxed to [facility] with the PAS. Updated patient who agrees to this  plan. She requests wheelchair transport and was advised this is typically covered under her Medicaid, though this can not be guaranteed. [facility] wheelchair ride set at 1700. Patent asked that her sister be udpdated. Was able to speak with Yudy about this plan. She is in agreement and plans to keep involved with the discharge planning process from the TCU.  D/C to [facility] at 1700 today.

## 2018-01-24 NOTE — DISCHARGE SUMMARY
[facility]    Discharge Summary  Hospitalist    Date of Admission:  1/23/2018  Date of Discharge:  1/24/2018  Discharging Provider: Rai Melgoza  Date of Service (when I saw the patient): 01/24/18    Discharge Diagnoses      Acute Metabolic Encephalopathy  Hypothyroidism     History of Present Illness   Carri Arias is a 67-year-old female with a past medical history significant for meningioma, status post bleed and resection end of 2017, hypothyroidism, dyslipidemia, peripheral neuropathy, anxiety, previous TBI and reported cognitive defects, who presents to the Emergency Department at [facility] when a friend that she was confused.   She was then sent to [facility] for ongoing management.      Hospital Course   Carri Arias was admitted on 1/23/2018.  The following problems were addressed during her hospitalization:    Acute Metabolic Encephalopathy  The patients sister who lives in [place] reports increasing confusion noted on the phone the last few weeks.  Her home care RN reports the patient has not been answering her phone/door well and has had overt medication confusion lately. They did a welfare check just a week ago and more home visits lately.  There is a lot of concern about home safety.  The patient has also had some falls though it is unclear how often the past few weeks.  The patient feels everyone is over concerned and she is fine.  Given the concerns in the context of her history, neurology was consulted. There was no concern for infection at this time and her head CT was without acute findings.  There was a linear hyperdensity beneath the craniotomy site likely representing some dural thickening/calcification and potentially some residual subdural blood but not felt to be playing a role. She reported sx's have been present since her meningioma resection and often forgets to take her medications. This was confirmed with her RN and a subtherapeutic keppra level  (<2). Her TSH was also noted to be elevated at 17.43 with a FT4 level of 1.05. It was felt that her confusion was most likely multifactorial and that ensuring her keppra was administered and that correcting her thyroid function would likely improve her sx's.  She was discharged to a TCU to ensure a more supervised environment and was to follow up with her Neurologist with repeat keppra levels.  She was also to f/u with her pcp for repeat TFT's in 6 weeks. She was oriented and appropriate at time of d/c.            Meningioma resection 10/2017 fairly  H/o subarachnoid hemmorhage 3/2017  TBI  Resection at [facility] 10/2017. Also suffered a SAH after a fall earlier last year as well. Keppra levels as above.      Hypothyroidism:  TSH high at 17.43 but free T4 normal range at 1.05.  Her levothyroxine was increased and a recheck as an outpatient needed in 6 weeks.    Neuropathy:  Resume gabapentin      Rai Melgoza    Significant Results and Procedures   See below    Pending Results   These results will be followed up by NH MD, PCP and Neurology  Unresulted Labs Ordered in the Past 30 Days of this Admission     Date and Time Order Name Status Description    1/23/2018 1026 Keppra (Levetiracetam) Level In process           Code Status   DNR / DNI       Primary Care Physician   [facility]    Physical Exam   Temp: 97.9  F (36.6  C) Temp src: Oral BP: 97/52 Pulse: (!) 46 Heart Rate: 45 Resp: 16 SpO2: 94 % O2 Device: None (Room air)    Vitals:    01/23/18 0528   Weight: 58.5 kg (128 lb 15.5 oz)     Vital Signs with Ranges  Temp:  [97.9  F (36.6  C)-98.9  F (37.2  C)] 97.9  F (36.6  C)  Pulse:  [46] 46  Heart Rate:  [45-54] 45  Resp:  [16] 16  BP: ()/(48-55) 97/52  SpO2:  [94 %-95 %] 94 %  I/O last 3 completed shifts:  In: 700 [P.O.:700]  Out: 200 [Urine:200]    Gen: Patient in no acute distress.  Appears comfortable.  Heart:  S1S2+, regular rate and rhythm, No murmurs.  Lungs:  Clear  to auscultation, no wheezing, no rales.   Abdomen:  Soft, non tender, non distended, bowel sounds positive.  Extremities:  No edema.    Discharge Disposition   Discharged to nursing home  Condition at discharge: Stable    Consultations This Hospital Stay   OCCUPATIONAL THERAPY ADULT IP CONSULT  PHYSICAL THERAPY ADULT IP CONSULT  SOCIAL WORK IP CONSULT  NEUROLOGY IP CONSULT  PHYSICAL THERAPY ADULT IP CONSULT  OCCUPATIONAL THERAPY ADULT IP CONSULT    Time Spent on this Encounter   I, Rai Leealexander Melgoza, personally saw the patient today and spent greater than 30 minutes discharging this patient.    Discharge Orders     General info for SNF   Length of Stay Estimate: Short Term Care: Estimated # of Days <30  Condition at Discharge: Improving  Level of care:skilled   Rehabilitation Potential: Good  Admission H&P remains valid and up-to-date: Yes  Recent Chemotherapy: N/A  Use Nursing Home Standing Orders: Yes     Mantoux instructions   Give two-step Mantoux (PPD) Per Facility Policy Yes     Reason for your hospital stay   You presented with confusion. No clear source of your confusion was found other than an elevated TSH which we will attempt to correct by increasing your Synthroid dose.     Intake and output   Every shift     Daily weights   Call Provider for weight gain of more than 2 pounds per day or 5 pounds per week.     Follow Up and recommended labs and tests   Follow up with jail physician.  The following labs/tests are recommended: Will need to follow up with her Keppra level. This was still pending on d/c.  - Will need to ensure follow up with her pcp in 4-6 weeks to re-check her TFT's to ensure TSH is down trending.     Activity - Up with nursing assistance     DNR/DNI     Physical Therapy Adult Consult   Evaluate and treat as clinically indicated.    Reason:  Deconditioning     Occupational Therapy Adult Consult   Evaluate and treat as clinically indicated.    Reason:  Deconditioning and  Cognitive impairment.     Fall precautions     Advance Diet as Tolerated   Follow this diet upon discharge: Orders Placed This Encounter       Regular Diet Adult       Discharge Medications   Current Discharge Medication List      CONTINUE these medications which have CHANGED    Details   levothyroxine (SYNTHROID/LEVOTHROID) 100 MCG tablet Take 1 tablet (100 mcg) by mouth daily  Qty: 30 tablet, Refills: 3    Associated Diagnoses: Acquired hypothyroidism         CONTINUE these medications which have NOT CHANGED    Details   gabapentin (NEURONTIN) 300 MG capsule Take 600 mg by mouth 3 times daily      simvastatin (ZOCOR) 20 MG tablet Take 20 mg by mouth At Bedtime      Calcium Carb-Cholecalciferol (CALCIUM-VITAMIN D) 600-400 MG-UNIT TABS Take 1 tablet by mouth 2 times daily      FLUoxetine 20 MG tablet Take 20 mg by mouth every morning      levETIRAcetam (KEPPRA) 500 MG tablet Take 500 mg by mouth 2 times daily         STOP taking these medications       FLUoxetine (PROZAC) 20 MG capsule Comments:   Reason for Stopping:             Allergies   Allergies   Allergen Reactions     Penicillins Shortness Of Breath and Hives     Chest heaviness     Contrast Dye Hives     Data   Most Recent 3 CBC's:  Recent Labs   Lab Test  01/22/18 2257 04/17/14   0745  04/14/14   0700  04/12/14   0630   04/05/14   0605   WBC  9.9   --    --   9.0   --   7.9   HGB  15.7  14.2   --   14.0   --   12.0   MCV  90   --    --   92   --   89   PLT  281  400  462*  432   < >  240    < > = values in this interval not displayed.      Most Recent 3 BMP's:  Recent Labs   Lab Test  01/23/18   0910  01/22/18 2257 04/17/14   0745  04/12/14   0630   NA   --   141  140  143   POTASSIUM  3.7  3.3*  4.2  4.1   CHLORIDE   --   104  105  108   CO2   --   25  30  29   BUN   --   24  13  13   CR   --   0.80  0.61  0.70   ANIONGAP   --   12  4*  7   VICKY   --   8.8  8.2*  8.3*   GLC   --   104*  94  88     Most Recent 2 LFT's:  Recent Labs   Lab Test   01/22/18 2257   AST  18   ALT  16   ALKPHOS  95   BILITOTAL  0.5     Most Recent INR's and Anticoagulation Dosing History:  Anticoagulation Dose History     There is no flowsheet data to display.        Most Recent 3 Troponin's:  Recent Labs   Lab Test  01/22/18 2257   TROPI  <0.015     Most Recent Cholesterol Panel:No lab results found.  Most Recent 6 Bacteria Isolates From Any Culture (See EPIC Reports for Culture Details):  Recent Labs   Lab Test  04/08/14   1328   CULT  >100,000 colonies/mL Escherichia coli*     Most Recent TSH, T4 and A1c Labs:  Recent Labs   Lab Test  01/22/18 2257   TSH  17.43*   T4  1.05       Results for orders placed or performed during the hospital encounter of 01/22/18   Head CT w/o contrast    Narrative    CT HEAD W/O CONTRAST  1/22/2018 11:23 PM     HISTORY: AMS.    TECHNIQUE: Axial images of the head and coronal reformations without  IV contrast material. Radiation dose for this scan was reduced using  automated exposure control, adjustment of the mA and/or kV according  to patient size, or iterative reconstruction technique.    COMPARISON: 5/30/2017.    FINDINGS: Interval right frontal craniotomy with resection of the  previously seen partially calcified mass along the anterior falx which  was likely a meningioma. Just beneath the craniotomy site there is a  small amount of linear high density along the dura which is likely  postoperative change or dural calcification. Some minimal residual  subdural blood in this region is difficult to exclude but this likely  represents chronic, postoperative change. This measures up to 0.4 cm  in thickness. No associated mass effect. Otherwise no intracranial  hemorrhage. No acute infarct identified. Low attenuation areas are  present in white matter of the cerebral hemispheres that are  nonspecific but consistent with chronic small vessel ischemic changes  in this age patient.      Impression    IMPRESSION:  1. No acute findings.  2.  Interval right frontal craniotomy with resection of the previously  seen mass along the anterior falx.  3. Thin linear hyperdensity beneath the craniotomy site likely  represents some dural thickening/calcification and potentially some  minimal residual subdural blood. This is likely chronic, postoperative  change. Comparison with more recent postoperative study would be  helpful to confirm this.    Findings discussed with the ER physician at 11:40 p.m.    KACI HOLDEN MD

## 2018-01-24 NOTE — PROGRESS NOTES
Observational goals:    PT/OT completed-pending    Ambulating independently-still Assist one to commode    Safe dispo plan in place-not yet

## 2018-01-24 NOTE — PLAN OF CARE
Problem: Patient Care Overview  Goal: Plan of Care/Patient Progress Review  Outcome: No Change  Obs status.Pt remains alert to self only, knew month and day. calm and coop. Up with 1 assist to chair, would require more assist and walker to amb further. VSS, RA. C/O back pain, repos encouraged. Jose diet, fair appetite. Inc urine and also gets up to the BSC. Neuro saw and thinking hypothyroid issues may contribute to confusion and synthroid increased. Plan for PT/OT consult and SW.

## 2018-01-24 NOTE — PLAN OF CARE
Problem: Patient Care Overview  Goal: Plan of Care/Patient Progress Review  PT: Orders received, OBS status noted.  Per discussion with OT, OT is recommending discharge to TCU.  Per chart review, pt has been accepted to TCU.  Will defer full PT evaluation at this time.

## 2018-01-24 NOTE — PROGRESS NOTES
Observational goals:     PT/OT completed-pending     Ambulating independently-up with assist of 1 to BSC     Safe dispo plan in place-no, SW involved

## 2018-01-24 NOTE — PLAN OF CARE
Problem: Patient Care Overview  Goal: Plan of Care/Patient Progress Review  OT: Orders received, eval completed and treatment initiated. Pt is a 67-year-old female with a past medical history significant for meningioma, status post resection end of 2017, hypothyroidism, dyslipidemia, peripheral neuropathy, anxiety, previous TBI and reported cognitive defects. Pt reports living in [facility], on 2nd floor with no elevator access. Pt reports no longer driving (since Oct) d/t MD telling her to stop. Pt reports having house keeper run errands. House keeper 1x every other week. Pt reports also having another provider to take her to appointments. Pt has standard toiltes with grab bars and vanity, tub/shower with shower chair and grab bars. Pt reports IND with dressing, toileting, bathing, making microwave meals, medication management (reports unable to complete safely), finances. Pt has A with cleaning and laundry. Pt reports multiple falls due to LE weakness and buckling. Pt reports hitting head, bruising UE and LE.     Discharge Planner OT   Patient plan for discharge: hopeful to home, open to therapy  Current status: OT: Pt alert and oriented to date, day of week, month, but not to year. Pt knew current President but reports previous President to have been Ignacio. Pt able to complete functional transfers and mobility with FWW and SBA/CGA. Noted 1 LOB while standing at the sink to complete g/h task. Pt presents with impaired orientation, problem solving, attention to task and following directions noted with functional ADLs and cognitive screens. Attempted to complete SLUMS, pt declined to complete clock drawing as she stated she has completed in the past and has not done well. Pt Able to answer 3/3 home safety questions correctly with cues to elaborate answers. Pt able to complete maze drawing with increased time (over 2 mins) and with poor attention to task- requiring redirection. Offical SLUMS score unable to be attained due  to incompletion- does demonstrate impaired simple math, naming, backwards chaining, and recall. Pt also reports having trouble with managing her medications- often forgetting to take them and has had 10+ falls with injury to her head and body. Pt reports confusion has no changed but balance deficits have.   Barriers to return to prior living situation: Impaired attention to task, impaired following directions, impaired problem solving, impaired recall, impaired balance with multiple falls at home.   Recommendations for discharge: TCU  Rationale for recommendations: Due to functional performance as well as cognitive screen, recommend TCU for continued skilled OT/PT to address deficits listed above. Pt may benefit from higher level support at d/c.        Entered by: Evonne Ceron 01/24/2018 11:30 AM     Defer further assessment to TCU.

## 2018-01-24 NOTE — PLAN OF CARE
Problem: Patient Care Overview  Goal: Plan of Care/Patient Progress Review  Outcome: No Change  A&O x 3. Disoriented to time. Stated it was 2016.  Bradycardic at times. VSS otherwise. Lung sounds clear. Denied pain. IV SL. Incontinent of urine. Repositions self independently. Slept well throughout the night. Continue to monitor.

## 2018-01-24 NOTE — PROGRESS NOTES
[facility]      OUTPATIENT OCCUPATIONAL THERAPY  EVALUATION  PLAN OF TREATMENT FOR OUTPATIENT REHABILITATION  (COMPLETE FOR INITIAL CLAIMS ONLY)  Patient's Last Name, First Name, M.I.  YOB: 1950  Straff[place]anna spencercarmen NEELY                          Provider's Name  [facility] Medical Record No.  [id number]                               Onset Date:  01/23/18   Start of Care Date:  01/24/18     Type:     ___PT   _X_OT   ___SLP Medical Diagnosis:  confusion with fall                        OT Diagnosis:  Impaired ADLs/IADLs and functional mobility   Visits from SOC:  1   _________________________________________________________________________________  Plan of Treatment/Functional Goals    Planned Interventions:  ,       Goals: See Occupational Therapy Goals on Care Plan in [facility] electronic health record.    Therapy Frequency:    Predicted Duration of Therapy Intervention: 1x  _________________________________________________________________________________    I CERTIFY THE NEED FOR THESE SERVICES FURNISHED UNDER        THIS PLAN OF TREATMENT AND WHILE UNDER MY CARE .             Physician Signature               Date    X_____________________________________________________                      Certification date from: 01/24/18, Certification date to: 01/24/18    Referring Physician: Sumit            Initial Assessment        See Occupational Therapy evaluation dated 01/24/18 in Epic electronic health record.

## 2018-01-29 VITALS
RESPIRATION RATE: 18 BRPM | DIASTOLIC BLOOD PRESSURE: 51 MMHG | OXYGEN SATURATION: 97 % | HEART RATE: 78 BPM | TEMPERATURE: 97.2 F | BODY MASS INDEX: 21.46 KG/M2 | WEIGHT: 137 LBS | SYSTOLIC BLOOD PRESSURE: 97 MMHG

## 2018-01-29 NOTE — PROGRESS NOTES
[place] [facility]    PRIMARY CARE PROVIDER AND CLINIC:  Clinic, [facility] [address] / [facility] [address]    Chief Complaint   Patient presents with     Hospital F/U       HPI:    Carri Arias is a 67 year old  (1950),admitted to the [facility] from [facility].  Hospital stay 1/23/2018 through 1/24/2018.  Admitted to this facility for  rehab, medical management and nursing care.  HPI information obtained from: facility chart records, facility staff, patient report and [facility] chart review.         Hospital Course     Carri Arias was admitted on 1/23/2018.  The following problems were addressed during her hospitalization:     Acute Metabolic Encephalopathy  The patients sister who lives in [place] reports increasing confusion noted on the phone the last few weeks.  Her home care RN reports the patient has not been answering her phone/door well and has had overt medication confusion lately. They did a welfare check just a week ago and more home visits lately.  There is a lot of concern about home safety.  The patient has also had some falls though it is unclear how often the past few weeks.  The patient feels everyone is over concerned and she is fine.  Given the concerns in the context of her history, neurology was consulted. There was no concern for infection at this time and her head CT was without acute findings.  There was a linear hyperdensity beneath the craniotomy site likely representing some dural thickening/calcification and potentially some residual subdural blood but not felt to be playing a role. She reported sx's have been present since her meningioma resection and often forgets to take her medications. This was confirmed with her RN and a subtherapeutic keppra level (<2). Her TSH was also noted to be elevated at 17.43 with a FT4 level of 1.05. It was felt that her confusion was most likely  multifactorial and that ensuring her keppra was administered and that correcting her thyroid function would likely improve her sx's.  She was discharged to a TCU to ensure a more supervised environment and was to follow up with her Neurologist with repeat keppra levels.  She was also to f/u with her pcp for repeat TFT's in 6 weeks. She was oriented and appropriate at time of d/c.             Meningioma resection 10/2017 fairly  H/o subarachnoid hemmorhage 3/2017  TBI  Resection at [facility] 10/2017. Also suffered a SAH after a fall earlier last year as well. Keppra levels as above.       Hypothyroidism:  TSH high at 17.43 but free T4 normal range at 1.05.  Her levothyroxine was increased and a recheck as an outpatient needed in 6 weeks.     Neuropathy:  Resume gabapentin  ---------------------------------------------------  Current issues are:  Acute metabolic encephalopathy  As noted above  Appear slightly confused today - making some nonsensical statements throughout our conversation  Unsure of patient baseline  OT following:    Functional activity-     CPT med box- 4/6    Visual/auditory tasks, 2 step/4 component- Able to successfully complete 4/5 memory tasks. Unable to recall second step when distractor is added between instructions.     OT facilitated graded series of ther ex indicated to increase strength, tolerance and endurance for functional mobility and self cares:    10' UBE increased resistance from seated    5' static stand for FM task with CGA    Postoperative hypothyroidism  S/P thyroidectomy  Chronic  On regimen of Synthroid 100mcg qDay  TSH   Date Value Ref Range Status   01/22/2018 17.43 (H) 0.40 - 4.00 mU/L Final     S/P resection of meningioma  As noted above  On regimen of Keppra  Medication non-compliance 2/2 forgetfulness as noted above  No seizures noted    Traumatic brain injury with loss of consciousness, subsequent encounter  As noted above  BIMs  "15/15    Neuropathy  Chronic  Continues to note ongoing presence of this in BLE not relieved by current Neurontin regimen - would like to try increasing this to better control pain    Other hyperlipidemia  Chronic  On regimen of Zocor  No results for input(s): CHOL, HDL, LDL, TRIG, CHOLHDLRATIO in the last 05233 hours.    Adjustment disorder with depressed mood  Chronic  Denies feelings of depression today  On regimen of Prozac  Admit PHQ9: 00/27    Physical deconditioning  2/2 hospitalization and above noted dx  PT following:    WBAT    Devices: RW w/CGA: \"125' x2\"    NuStep L3 resistance x15 min c B LE and UE    Standing exercises in // bars, 1 sets of 20 c B LE    CODE STATUS/ADVANCE DIRECTIVES DISCUSSION:   DNR / DNI  Patient's living condition: lives alone    ALLERGIES:Penicillins and Contrast dye  PAST MEDICAL HISTORY:  has a past medical history of Arthritis; Hypercholesterolemia; Hypothyroidism; and Tremors. She also has no past medical history of Chronic infection; Malignant hyperthermia; or Sleep apnea.  PAST SURGICAL HISTORY:  has a past surgical history that includes Hysterectomy total abdominal, bilateral salpingo-oophorectomy, combined; appendectomy; Lumpectomy breast; tonsillectomy; and Thyroidectomy (8/31/2012).  FAMILY HISTORY: family history is not on file.  SOCIAL HISTORY:  reports that she has been smoking.  She does not have any smokeless tobacco history on file. She reports that she drinks alcohol. She reports that she does not use illicit drugs.    Post Discharge Medication Reconciliation Status: discharge medications reconciled, continue medications without change.  Current Outpatient Prescriptions   Medication Sig Dispense Refill     levothyroxine (SYNTHROID/LEVOTHROID) 100 MCG tablet Take 1 tablet (100 mcg) by mouth daily 30 tablet 3     gabapentin (NEURONTIN) 300 MG capsule Take 600 mg by mouth 3 times daily       simvastatin (ZOCOR) 20 MG tablet Take 20 mg by mouth At Bedtime       " levETIRAcetam (KEPPRA) 500 MG tablet Take 500 mg by mouth 2 times daily       Calcium Carb-Cholecalciferol (CALCIUM-VITAMIN D) 600-400 MG-UNIT TABS Take 1 tablet by mouth 2 times daily       FLUoxetine 20 MG tablet Take 20 mg by mouth every morning         ROS:  10 point ROS of systems including Constitutional, Eyes, Respiratory, Cardiovascular, Gastroenterology, Genitourinary, Integumentary, Muscularskeletal, Psychiatric were all negative except for pertinent positives noted in my HPI.    Exam:  BP 97/51  Pulse 78  Temp 97.2  F (36.2  C)  Resp 18  Wt 137 lb (62.1 kg)  SpO2 97%  BMI 21.46 kg/m2  GENERAL APPEARANCE:  Alert, in no distress, appears healthy, oriented, cooperative, slightly forgetful and sarcastic, middle-aged woman in w/c in room  ENT:  Mouth and posterior oropharynx normal, moist mucous membranes, normal hearing acuity  EYES:  EOM, conjunctivae, lids, pupils and irises normal, wears glasses  NECK:  No adenopathy,masses or thyromegaly  RESP:  respiratory effort and palpation of chest normal, lungs clear to auscultation , no respiratory distress  CV:  Palpation and auscultation of heart done , regular rate and rhythm, no murmur, rub, or gallop, no edema, +2 pedal pulses  ABDOMEN:  normal bowel sounds, soft, nontender, no hepatosplenomegaly or other masses  M/S:   Gait and station abnormal  - SUNDAY 2/2 patient being in w/  Digits and nails normal  SKIN:  Inspection of skin and subcutaneous tissue baseline, Palpation of skin and subcutaneous tissue baseline  NEURO:   Cranial nerves 2-12 are normal tested and grossly at patient's baseline  PSYCH:  oriented X 3, normal insight, judgement and memory, affect and mood normal, slightly forgetful     BP Readin/82, 128/76, 98/34                 HR:  55-78    Lab/Diagnostic data:  CBC RESULTS:   Recent Labs   Lab Test  18   2257  14   0745   14   0630   WBC  9.9   --    --   9.0   RBC  5.42*   --    --   4.63   HGB  15.7  14.2   --    14.0   HCT  48.9*   --    --   42.7   MCV  90   --    --   92   MCH  29.0   --    --   30.2   MCHC  32.1   --    --   32.8   RDW  13.4   --    --   14.8   PLT  281  400   < >  432    < > = values in this interval not displayed.       Last Basic Metabolic Panel:  Recent Labs   Lab Test  01/23/18   0910  01/22/18   2257  04/17/14   0745   NA   --   141  140   POTASSIUM  3.7  3.3*  4.2   CHLORIDE   --   104  105   VICKY   --   8.8  8.2*   CO2   --   25  30   BUN   --   24  13   CR   --   0.80  0.61   GLC   --   104*  94       Liver Function Studies -   Recent Labs   Lab Test  01/22/18 2257   PROTTOTAL  7.3   ALBUMIN  3.3*   BILITOTAL  0.5   ALKPHOS  95   AST  18   ALT  16       TSH   Date Value Ref Range Status   01/22/2018 17.43 (H) 0.40 - 4.00 mU/L Final       ASSESSMENT/PLAN:   Diagnosis Comments   1. Acute metabolic encephalopathy  Resolving  BMP   2. Postoperative hypothyroidism  TSH  Continue current synthroid dosing   3. S/P resection of meningioma  Continue Keppra as ordered   4. S/P thyroidectomy  TSH/Synthroid as above   5. Traumatic brain injury with loss of consciousness, subsequent encounter  OT following   6. Neuropathy  Increase Gabapentin to 800mg TID  Monitor for increased S/e   7. Other hyperlipidemia  Stable on current regimen; continue medications as currently ordered.   8. Adjustment disorder with depressed mood  Stable on current regimen; continue medications as currently ordered.   9. Physical deconditioning  PT following - adv per their recommendations     Electronically signed by:  CHALINO Ruby CNP

## 2018-02-01 ENCOUNTER — NURSING HOME VISIT (OUTPATIENT)
Dept: GERIATRICS | Facility: CLINIC | Age: 68
End: 2018-02-01
Payer: MEDICARE

## 2018-02-01 DIAGNOSIS — G93.41 ACUTE METABOLIC ENCEPHALOPATHY: Primary | ICD-10-CM

## 2018-02-01 DIAGNOSIS — S06.9X9D TRAUMATIC BRAIN INJURY WITH LOSS OF CONSCIOUSNESS, SUBSEQUENT ENCOUNTER: ICD-10-CM

## 2018-02-01 DIAGNOSIS — Z98.890 S/P RESECTION OF MENINGIOMA: ICD-10-CM

## 2018-02-01 DIAGNOSIS — R53.81 PHYSICAL DECONDITIONING: ICD-10-CM

## 2018-02-01 DIAGNOSIS — E78.49 OTHER HYPERLIPIDEMIA: ICD-10-CM

## 2018-02-01 DIAGNOSIS — G62.9 NEUROPATHY: ICD-10-CM

## 2018-02-01 DIAGNOSIS — Z86.018 S/P RESECTION OF MENINGIOMA: ICD-10-CM

## 2018-02-01 DIAGNOSIS — E89.0 POSTOPERATIVE HYPOTHYROIDISM: ICD-10-CM

## 2018-02-01 DIAGNOSIS — F43.21 ADJUSTMENT DISORDER WITH DEPRESSED MOOD: ICD-10-CM

## 2018-02-01 DIAGNOSIS — E89.0 S/P THYROIDECTOMY: ICD-10-CM

## 2018-02-01 PROBLEM — Z86.79 H/O SUBARACHNOID HEMORRHAGE: Status: ACTIVE | Noted: 2017-03-01

## 2018-02-01 PROBLEM — E03.9 HYPOTHYROIDISM, UNSPECIFIED TYPE: Status: ACTIVE | Noted: 2018-02-01

## 2018-02-01 PROBLEM — S06.9XAA TBI (TRAUMATIC BRAIN INJURY) (H): Status: ACTIVE | Noted: 2018-02-01

## 2018-02-01 PROCEDURE — 99309 SBSQ NF CARE MODERATE MDM 30: CPT | Performed by: NURSE PRACTITIONER

## 2018-02-01 NOTE — LETTER
2/1/2018        RE: Carri Arias  [address]   [facility] [place] [address]        [place] [facility]    PRIMARY CARE PROVIDER AND CLINIC:  Clinic, [facility] [address] / [facility] [address]    Chief Complaint   Patient presents with     Hospital F/U       HPI:    Carri Arias is a 67 year old  (1950),admitted to the [facility] from [facility].  Hospital stay 1/23/2018 through 1/24/2018.  Admitted to this facility for  rehab, medical management and nursing care.  HPI information obtained from: facility chart records, facility staff, patient report and [facility] chart review.         Hospital Course     Carri Arias was admitted on 1/23/2018.  The following problems were addressed during her hospitalization:     Acute Metabolic Encephalopathy  The patients sister who lives in [place] reports increasing confusion noted on the phone the last few weeks.  Her home care RN reports the patient has not been answering her phone/door well and has had overt medication confusion lately. They did a welfare check just a week ago and more home visits lately.  There is a lot of concern about home safety.  The patient has also had some falls though it is unclear how often the past few weeks.  The patient feels everyone is over concerned and she is fine.  Given the concerns in the context of her history, neurology was consulted. There was no concern for infection at this time and her head CT was without acute findings.  There was a linear hyperdensity beneath the craniotomy site likely representing some dural thickening/calcification and potentially some residual subdural blood but not felt to be playing a role. She reported sx's have been present since her meningioma resection and often forgets to take her medications. This was confirmed with her RN and a subtherapeutic keppra level (<2). Her TSH was also  noted to be elevated at 17.43 with a FT4 level of 1.05. It was felt that her confusion was most likely multifactorial and that ensuring her keppra was administered and that correcting her thyroid function would likely improve her sx's.  She was discharged to a TCU to ensure a more supervised environment and was to follow up with her Neurologist with repeat keppra levels.  She was also to f/u with her pcp for repeat TFT's in 6 weeks. She was oriented and appropriate at time of d/c.             Meningioma resection 10/2017 fairly  H/o subarachnoid hemmorhage 3/2017  TBI  Resection at [facility] 10/2017. Also suffered a SAH after a fall earlier last year as well. Keppra levels as above.       Hypothyroidism:  TSH high at 17.43 but free T4 normal range at 1.05.  Her levothyroxine was increased and a recheck as an outpatient needed in 6 weeks.     Neuropathy:  Resume gabapentin  ---------------------------------------------------  Current issues are:  Acute metabolic encephalopathy  As noted above  Appear slightly confused today - making some nonsensical statements throughout our conversation  Unsure of patient baseline  OT following:    Functional activity-     CPT med box- 4/6    Visual/auditory tasks, 2 step/4 component- Able to successfully complete 4/5 memory tasks. Unable to recall second step when distractor is added between instructions.     OT facilitated graded series of ther ex indicated to increase strength, tolerance and endurance for functional mobility and self cares:    10' UBE increased resistance from seated    5' static stand for FM task with CGA    Postoperative hypothyroidism  S/P thyroidectomy  Chronic  On regimen of Synthroid 100mcg qDay  TSH   Date Value Ref Range Status   01/22/2018 17.43 (H) 0.40 - 4.00 mU/L Final     S/P resection of meningioma  As noted above  On regimen of Keppra  Medication non-compliance 2/2 forgetfulness as noted above  No seizures noted    Traumatic brain injury  "with loss of consciousness, subsequent encounter  As noted above  BIMs 15/15    Neuropathy  Chronic  Continues to note ongoing presence of this in BLE not relieved by current Neurontin regimen - would like to try increasing this to better control pain    Other hyperlipidemia  Chronic  On regimen of Zocor  No results for input(s): CHOL, HDL, LDL, TRIG, CHOLHDLRATIO in the last 08830 hours.    Adjustment disorder with depressed mood  Chronic  Denies feelings of depression today  On regimen of Prozac  Admit PHQ9: 00/27    Physical deconditioning  2/2 hospitalization and above noted dx  PT following:    WBAT    Devices: RW w/CGA: \"125' x2\"    NuStep L3 resistance x15 min c B LE and UE    Standing exercises in // bars, 1 sets of 20 c B LE    CODE STATUS/ADVANCE DIRECTIVES DISCUSSION:   DNR / DNI  Patient's living condition: lives alone    ALLERGIES:Penicillins and Contrast dye  PAST MEDICAL HISTORY:  has a past medical history of Arthritis; Hypercholesterolemia; Hypothyroidism; and Tremors. She also has no past medical history of Chronic infection; Malignant hyperthermia; or Sleep apnea.  PAST SURGICAL HISTORY:  has a past surgical history that includes Hysterectomy total abdominal, bilateral salpingo-oophorectomy, combined; appendectomy; Lumpectomy breast; tonsillectomy; and Thyroidectomy (8/31/2012).  FAMILY HISTORY: family history is not on file.  SOCIAL HISTORY:  reports that she has been smoking.  She does not have any smokeless tobacco history on file. She reports that she drinks alcohol. She reports that she does not use illicit drugs.    Post Discharge Medication Reconciliation Status: discharge medications reconciled, continue medications without change.  Current Outpatient Prescriptions   Medication Sig Dispense Refill     levothyroxine (SYNTHROID/LEVOTHROID) 100 MCG tablet Take 1 tablet (100 mcg) by mouth daily 30 tablet 3     gabapentin (NEURONTIN) 300 MG capsule Take 600 mg by mouth 3 times daily       " simvastatin (ZOCOR) 20 MG tablet Take 20 mg by mouth At Bedtime       levETIRAcetam (KEPPRA) 500 MG tablet Take 500 mg by mouth 2 times daily       Calcium Carb-Cholecalciferol (CALCIUM-VITAMIN D) 600-400 MG-UNIT TABS Take 1 tablet by mouth 2 times daily       FLUoxetine 20 MG tablet Take 20 mg by mouth every morning         ROS:  10 point ROS of systems including Constitutional, Eyes, Respiratory, Cardiovascular, Gastroenterology, Genitourinary, Integumentary, Muscularskeletal, Psychiatric were all negative except for pertinent positives noted in my HPI.    Exam:  BP 97/51  Pulse 78  Temp 97.2  F (36.2  C)  Resp 18  Wt 137 lb (62.1 kg)  SpO2 97%  BMI 21.46 kg/m2  GENERAL APPEARANCE:  Alert, in no distress, appears healthy, oriented, cooperative, slightly forgetful and sarcastic, middle-aged woman in w/c in room  ENT:  Mouth and posterior oropharynx normal, moist mucous membranes, normal hearing acuity  EYES:  EOM, conjunctivae, lids, pupils and irises normal, wears glasses  NECK:  No adenopathy,masses or thyromegaly  RESP:  respiratory effort and palpation of chest normal, lungs clear to auscultation , no respiratory distress  CV:  Palpation and auscultation of heart done , regular rate and rhythm, no murmur, rub, or gallop, no edema, +2 pedal pulses  ABDOMEN:  normal bowel sounds, soft, nontender, no hepatosplenomegaly or other masses  M/S:   Gait and station abnormal  - SUNDAY 2/2 patient being in w/c  Digits and nails normal  SKIN:  Inspection of skin and subcutaneous tissue baseline, Palpation of skin and subcutaneous tissue baseline  NEURO:   Cranial nerves 2-12 are normal tested and grossly at patient's baseline  PSYCH:  oriented X 3, normal insight, judgement and memory, affect and mood normal, slightly forgetful     BP Readin/82, 128/76, 98/34                 HR:  55-78    Lab/Diagnostic data:  CBC RESULTS:   Recent Labs   Lab Test  18   2257  14   0745   14   0630   WBC  9.9    --    --   9.0   RBC  5.42*   --    --   4.63   HGB  15.7  14.2   --   14.0   HCT  48.9*   --    --   42.7   MCV  90   --    --   92   MCH  29.0   --    --   30.2   MCHC  32.1   --    --   32.8   RDW  13.4   --    --   14.8   PLT  281  400   < >  432    < > = values in this interval not displayed.       Last Basic Metabolic Panel:  Recent Labs   Lab Test  01/23/18   0910  01/22/18   2257  04/17/14   0745   NA   --   141  140   POTASSIUM  3.7  3.3*  4.2   CHLORIDE   --   104  105   VICKY   --   8.8  8.2*   CO2   --   25  30   BUN   --   24  13   CR   --   0.80  0.61   GLC   --   104*  94       Liver Function Studies -   Recent Labs   Lab Test  01/22/18 2257   PROTTOTAL  7.3   ALBUMIN  3.3*   BILITOTAL  0.5   ALKPHOS  95   AST  18   ALT  16       TSH   Date Value Ref Range Status   01/22/2018 17.43 (H) 0.40 - 4.00 mU/L Final       ASSESSMENT/PLAN:   Diagnosis Comments   1. Acute metabolic encephalopathy  Resolving  BMP   2. Postoperative hypothyroidism  TSH  Continue current synthroid dosing   3. S/P resection of meningioma  Continue Keppra as ordered   4. S/P thyroidectomy  TSH/Synthroid as above   5. Traumatic brain injury with loss of consciousness, subsequent encounter  OT following   6. Neuropathy  Increase Gabapentin to 800mg TID  Monitor for increased S/e   7. Other hyperlipidemia  Stable on current regimen; continue medications as currently ordered.   8. Adjustment disorder with depressed mood  Stable on current regimen; continue medications as currently ordered.   9. Physical deconditioning  PT following - adv per their recommendations     Electronically signed by:  CHALINO Ruby CNP      Sincerely,        CHALINO Ruby CNP
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
## 2018-02-02 ENCOUNTER — HOSPITAL ENCOUNTER (EMERGENCY)
Facility: CLINIC | Age: 68
Discharge: HOME | End: 2018-02-02
Attending: EMERGENCY MEDICINE | Admitting: EMERGENCY MEDICINE
Payer: COMMERCIAL

## 2018-02-02 VITALS
DIASTOLIC BLOOD PRESSURE: 52 MMHG | WEIGHT: 137 LBS | SYSTOLIC BLOOD PRESSURE: 125 MMHG | TEMPERATURE: 98.4 F | HEIGHT: 66 IN | BODY MASS INDEX: 22.02 KG/M2 | OXYGEN SATURATION: 98 % | HEART RATE: 54 BPM | RESPIRATION RATE: 16 BRPM
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
## 2018-02-02 LAB
BUN SERPL-MCNC: 17 MG/DL (ref 9–26)
CALCIUM SERPL-MCNC: 8.5 MG/DL (ref 8.4–10.2)
CHLORIDE SERPLBLD-SCNC: 109 MMOL/L (ref 98–109)
CO2 SERPL-SCNC: 28 MMOL/L (ref 22–31)
CREAT SERPL-MCNC: 0.87 MG/DL (ref 0.55–1.02)
DIFFERENTIAL: NORMAL
ERYTHROCYTE [DISTWIDTH] IN BLOOD BY AUTOMATED COUNT: 14.5 % (ref 11–15)
GFR SERPL CREATININE-BSD FRML MDRD: >60 ML/MIN/1.73M2
GLUCOSE SERPL-MCNC: 83 MG/DL (ref 70–100)
HCT VFR BLD AUTO: 41.5 % (ref 35–46)
HEMOGLOBIN: 12.6 G/DL (ref 11.8–15.5)
MCV RBC AUTO: 94.7 FL (ref 80–100)
PLATELET # BLD AUTO: 201 K/CMM (ref 140–450)
POTASSIUM SERPL-SCNC: 4.5 MMOL/L (ref 3.5–5.2)
RBC # BLD AUTO: 4.38 M/CMM (ref 3.7–5.2)
SODIUM SERPL-SCNC: 143 MMOL/L (ref 136–145)
TSH SERPL-ACNC: 1.37 UIU/ML (ref 0.3–4.5)
WBC # BLD AUTO: 6.1 K/CMM (ref 3.8–11)

## 2018-02-02 PROCEDURE — 87491 CHLMYD TRACH DNA AMP PROBE: CPT | Performed by: EMERGENCY MEDICINE

## 2018-02-02 PROCEDURE — 99284 EMERGENCY DEPT VISIT MOD MDM: CPT

## 2018-02-02 PROCEDURE — 87591 N.GONORRHOEAE DNA AMP PROB: CPT | Performed by: EMERGENCY MEDICINE

## 2018-02-02 NOTE — ED AVS SNAPSHOT
Emergency Department    [address] [place] [address]    Phone:  [phone]    Fax:  [phone]                                       Carri Arias   MRN: [id number]    Department:   Emergency Department   Date of Visit:  2/2/2018           After Visit Summary Signature Page     I have received my discharge instructions, and my questions have been answered. I have discussed any challenges I see with this plan with the nurse or doctor.    ..........................................................................................................................................  Patient/Patient Representative Signature      ..........................................................................................................................................  Patient Representative Print Name and Relationship to Patient    ..................................................               ................................................  Date                                            Time    ..........................................................................................................................................  Reviewed by Signature/Title    ...................................................              ..............................................  Date                                                            Time
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
## 2018-02-03 NOTE — ED NOTES
ED Nursing Note:    Had very large liquid melenic stool.  Color dark red.  Estimated volume 500 cc.   Awaiting placement.   Alert, cooperative.

## 2018-02-04 LAB
C TRACH DNA SPEC QL NAA+PROBE: NEGATIVE
N GONORRHOEA DNA SPEC QL NAA+PROBE: NEGATIVE
SPECIMEN SOURCE: NORMAL
SPECIMEN SOURCE: NORMAL

## 2018-02-07 ENCOUNTER — TRANSFERRED RECORDS (OUTPATIENT)
Dept: HEALTH INFORMATION MANAGEMENT | Facility: CLINIC | Age: 68
End: 2018-02-07

## 2018-02-07 VITALS
WEIGHT: 143.3 LBS | BODY MASS INDEX: 23.03 KG/M2 | OXYGEN SATURATION: 97 % | SYSTOLIC BLOOD PRESSURE: 115 MMHG | TEMPERATURE: 97.9 F | RESPIRATION RATE: 18 BRPM | HEIGHT: 66 IN | DIASTOLIC BLOOD PRESSURE: 57 MMHG | HEART RATE: 56 BPM

## 2018-02-07 RX ORDER — GABAPENTIN 400 MG/1
600 CAPSULE ORAL 3 TIMES DAILY
COMMUNITY
End: 2018-04-05

## 2018-02-08 ENCOUNTER — NURSING HOME VISIT (OUTPATIENT)
Dept: GERIATRICS | Facility: CLINIC | Age: 68
End: 2018-02-08
Payer: MEDICARE

## 2018-02-08 DIAGNOSIS — E78.00 PURE HYPERCHOLESTEROLEMIA: ICD-10-CM

## 2018-02-08 DIAGNOSIS — G62.9 PERIPHERAL POLYNEUROPATHY: Primary | ICD-10-CM

## 2018-02-08 DIAGNOSIS — F41.8 DEPRESSION WITH ANXIETY: ICD-10-CM

## 2018-02-08 DIAGNOSIS — E03.9 HYPOTHYROIDISM, UNSPECIFIED TYPE: ICD-10-CM

## 2018-02-08 DIAGNOSIS — S06.9X9S TRAUMATIC BRAIN INJURY WITH LOSS OF CONSCIOUSNESS, SEQUELA (H): ICD-10-CM

## 2018-02-08 DIAGNOSIS — R53.81 PHYSICAL DECONDITIONING: ICD-10-CM

## 2018-02-08 PROCEDURE — 99306 1ST NF CARE HIGH MDM 50: CPT | Performed by: INTERNAL MEDICINE
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
## 2018-02-15 ENCOUNTER — NURSING HOME VISIT (OUTPATIENT)
Dept: GERIATRICS | Facility: CLINIC | Age: 68
End: 2018-02-15
Payer: MEDICARE

## 2018-02-15 VITALS
WEIGHT: 143.3 LBS | OXYGEN SATURATION: 94 % | BODY MASS INDEX: 23.03 KG/M2 | HEIGHT: 66 IN | RESPIRATION RATE: 18 BRPM | DIASTOLIC BLOOD PRESSURE: 81 MMHG | TEMPERATURE: 98.6 F | SYSTOLIC BLOOD PRESSURE: 122 MMHG | HEART RATE: 82 BPM

## 2018-02-15 DIAGNOSIS — G62.9 NEUROPATHY: ICD-10-CM

## 2018-02-15 DIAGNOSIS — E89.0 POSTOPERATIVE HYPOTHYROIDISM: ICD-10-CM

## 2018-02-15 DIAGNOSIS — S06.9X9D TRAUMATIC BRAIN INJURY WITH LOSS OF CONSCIOUSNESS, SUBSEQUENT ENCOUNTER: ICD-10-CM

## 2018-02-15 DIAGNOSIS — E89.0 S/P THYROIDECTOMY: ICD-10-CM

## 2018-02-15 DIAGNOSIS — E78.49 OTHER HYPERLIPIDEMIA: ICD-10-CM

## 2018-02-15 DIAGNOSIS — Z98.890 S/P RESECTION OF MENINGIOMA: ICD-10-CM

## 2018-02-15 DIAGNOSIS — R53.81 PHYSICAL DECONDITIONING: ICD-10-CM

## 2018-02-15 DIAGNOSIS — Z86.018 S/P RESECTION OF MENINGIOMA: ICD-10-CM

## 2018-02-15 DIAGNOSIS — F43.21 ADJUSTMENT DISORDER WITH DEPRESSED MOOD: ICD-10-CM

## 2018-02-15 DIAGNOSIS — G93.41 ACUTE METABOLIC ENCEPHALOPATHY: Primary | ICD-10-CM

## 2018-02-15 PROCEDURE — 99309 SBSQ NF CARE MODERATE MDM 30: CPT | Performed by: NURSE PRACTITIONER
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
## 2018-03-28 VITALS
SYSTOLIC BLOOD PRESSURE: 123 MMHG | DIASTOLIC BLOOD PRESSURE: 69 MMHG | HEIGHT: 66 IN | WEIGHT: 148.8 LBS | OXYGEN SATURATION: 96 % | BODY MASS INDEX: 23.91 KG/M2 | RESPIRATION RATE: 18 BRPM | TEMPERATURE: 97.2 F | HEART RATE: 66 BPM

## 2018-03-28 RX ORDER — LEVOTHYROXINE SODIUM 100 UG/1
100 TABLET ORAL DAILY
COMMUNITY

## 2018-03-28 NOTE — PROGRESS NOTES
[facility]    Chief Complaint   Patient presents with     [facility] Regulatory       HPI:    Carri Arias is a 67 year old  (1950), who is being seen today for a federally mandated E/M visit at [facility].  HPI information obtained from: facility chart records, facility staff and patient report. Today's concerns are:  Neuropathy  Patient continues to note ongoing neuropathic pain on regimen of Gabapentin - previous trials of increasing regimen proved to increase resident's confusion/paranoia. She is willing to try alternative therapy (Lyrica)    Traumatic brain injury with loss of consciousness, subsequent encounter  Hx of  Is forgetful at baseline STM>LTM    BIMs: 13/15    CPT dress task 4/5    Meal prep task using microwave with set up and written instructions. Pt demonstrates meal prep independently with SBA during ambulation.     Sequencing steps cognitive activity. Pt needed extended time and one verbal cue for correction out of 12 tasks.     CPT med box- 4/6    Visual/auditory tasks, 2 step/4 component- Able to successfully complete 4/5 memory tasks. Unable to recall second step when distractor is added between instructions.     Pt able to provide appropriate responses to 9 out of 10 problem solving safety questions.     Pt provided appropriate responses to 7 out of 8 problem-solving safety questions.    CPT toast 4/5    Recommending [facility] at discharge for safety and assistance with higher level thinking tasks.    Postoperative hypothyroidism  2/2 to Hx of Menigioma  Continues on regimen of Synthroid 100mcg qDay  Lab Results   Component Value Date    TSH 1.37 02/02/2018         ALLERGIES: Penicillins and Contrast dye  PAST MEDICAL HISTORY:  has a past medical history of Arthritis; Hypercholesterolemia; Hypothyroidism; and Tremors. She also has no past medical history of Chronic infection; Malignant hyperthermia; or Sleep apnea.  PAST SURGICAL HISTORY:  has a  past surgical history that includes Hysterectomy total abdominal, bilateral salpingo-oophorectomy, combined; appendectomy; Lumpectomy breast; tonsillectomy; and Thyroidectomy (8/31/2012).  FAMILY HISTORY: family history is not on file.  SOCIAL HISTORY:  reports that she has been smoking.  She does not have any smokeless tobacco history on file. She reports that she drinks alcohol. She reports that she does not use illicit drugs.    MEDICATIONS:  Current Outpatient Prescriptions   Medication Sig Dispense Refill     levothyroxine (SYNTHROID/LEVOTHROID) 100 MCG tablet Take 100 mcg by mouth daily       gabapentin (NEURONTIN) 400 MG capsule Take 600 mg by mouth 3 times daily       simvastatin (ZOCOR) 20 MG tablet Take 20 mg by mouth At Bedtime       levETIRAcetam (KEPPRA) 500 MG tablet Take 500 mg by mouth 2 times daily       Calcium Carb-Cholecalciferol (CALCIUM-VITAMIN D) 600-400 MG-UNIT TABS Take 1 tablet by mouth 2 times daily       FLUoxetine 20 MG tablet Take 20 mg by mouth every morning       [DISCONTINUED] levothyroxine (SYNTHROID/LEVOTHROID) 100 MCG tablet Take 1 tablet (100 mcg) by mouth daily 30 tablet 3     Medications reviewed:  Medications reconciled to facility chart and changes were made to reflect current medications as identified as above med list. Below are the changes that were made:   Medications stopped since last EPIC medication reconciliation:   Medications Discontinued During This Encounter   Medication Reason     levothyroxine (SYNTHROID/LEVOTHROID) 100 MCG tablet Dose adjustment       Medications started since last [facility] medication reconciliation:  Orders Placed This Encounter   Medications     levothyroxine (SYNTHROID/LEVOTHROID) 100 MCG tablet     Sig: Take 100 mcg by mouth daily   Case Management:  I have reviewed the care plan and MDS and do agree with the plan. Patient's desire to return to the community is Present - currently working with relocation services.  Information reviewed:   "Medications, vital signs, orders, and nursing notes.    ROS:  4 point ROS including Respiratory, CV, GI and , other than that noted in the HPI,  is negative    Exam:  Vitals: /69  Pulse 66  Temp 97.2  F (36.2  C)  Resp 18  Ht 5' 6\" (1.676 m)  Wt 148 lb 12.8 oz (67.5 kg)  SpO2 96%  BMI 24.02 kg/m2  BMI= Body mass index is 24.02 kg/(m^2).  BP Readin/61, 98/56, 94/53          HR:  48-66  GENERAL APPEARANCE:  Alert, in no distress, appears healthy, oriented, cooperative, slightly forgetful and sarcastic, middle-aged ambulating through the hallways  ENT:  Mouth and posterior oropharynx normal, moist mucous membranes, normal hearing acuity  EYES:  EOM, conjunctivae, lids, pupils and irises normal, wears glasses  NECK:  No adenopathy, masses or thyromegaly  RESP:  Respiratory effort and palpation of chest normal, lungs clear to auscultation, no respiratory distress  CV:  Palpation and auscultation of heart done, regular rate and rhythm, no murmur, rub, or gallop, no edema, +2 pedal pulses  ABDOMEN:  normal bowel sounds, soft, nontender, no hepatosplenomegaly or other masses  M/S:   Gait and station normal with use of RW; Digits and nails normal  SKIN:  Inspection of skin and subcutaneous tissue baseline, Palpation of skin and subcutaneous tissue baseline  NEURO:   Cranial nerves 2-12 are normal tested and grossly at patient's baseline  PSYCH:  oriented X 3, normal insight, judgement and memory, affect and mood normal, slightly forgetful     Lab/Diagnostic data:   CBC RESULTS:   Recent Labs   Lab Test 18   2257   14   0630   WBC  6.1  9.9   --   9.0   RBC  4.38  5.42*   --   4.63   HGB  12.6  15.7   < >  14.0   HCT  41.5  48.9*   --   42.7   MCV  94.7  90   --   92   MCH   --   29.0   --   30.2   MCHC   --   32.1   --   32.8   RDW  14.5  13.4   --   14.8   PLT  201  281   < >  432    < > = values in this interval not displayed.       Last Basic Metabolic Panel:  Recent Labs   Lab " Test 02/02/18 01/23/18   0910  01/22/18   2257   NA  143   --   141   POTASSIUM  4.5  3.7  3.3*   CHLORIDE  109   --   104   VICKY  8.5   --   8.8   CO2  28   --   25   BUN  17   --   24   CR  0.87   --   0.80   GLC  83   --   104*       Liver Function Studies -   Recent Labs   Lab Test  01/22/18   2257   PROTTOTAL  7.3   ALBUMIN  3.3*   BILITOTAL  0.5   ALKPHOS  95   AST  18   ALT  16       TSH   Date Value Ref Range Status   02/02/2018 1.37 0.30 - 4.50 uIU/mL Final   01/22/2018 17.43 (H) 0.40 - 4.00 mU/L Final         ASSESSMENT/PLAN   Diagnosis Comments   1. Neuropathy  D/c Gabapentin  Start Lyrica 25mg PO TID  Notify NP of any cognitive changes with change of medications   2. Traumatic brain injury with loss of consciousness, subsequent encounter  Continue to work with relocation on [facility] placement  Will remain in LTC setting until placement  Continue with current POC and assistance as needed   3. Postoperative hypothyroidism  Stable on current regimen; continue medications as currently ordered.     Electronically signed by:  CHALINO Ruby CNP

## 2018-03-29 ENCOUNTER — NURSING HOME VISIT (OUTPATIENT)
Dept: GERIATRICS | Facility: CLINIC | Age: 68
End: 2018-03-29
Payer: MEDICARE

## 2018-03-29 DIAGNOSIS — E89.0 POSTOPERATIVE HYPOTHYROIDISM: ICD-10-CM

## 2018-03-29 DIAGNOSIS — S06.9X9D TRAUMATIC BRAIN INJURY WITH LOSS OF CONSCIOUSNESS, SUBSEQUENT ENCOUNTER: ICD-10-CM

## 2018-03-29 DIAGNOSIS — G62.9 NEUROPATHY: Primary | ICD-10-CM

## 2018-03-29 PROCEDURE — 99309 SBSQ NF CARE MODERATE MDM 30: CPT | Performed by: NURSE PRACTITIONER

## 2018-03-29 NOTE — LETTER
3/29/2018        RE: Carri Arias  [address]   [facility] [address]          [facility]    Chief Complaint   Patient presents with     half-way Regulatory       HPI:    Carri Arias is a 67 year old  (1950), who is being seen today for a federally mandated [facility]  HPI information obtained from: facility chart records, facility staff and patient report. Today's concerns are:  Neuropathy  Patient continues to note ongoing neuropathic pain on regimen of Gabapentin - previous trials of increasing regimen proved to increase resident's confusion/paranoia. She is willing to try alternative therapy (Lyrica)    Traumatic brain injury with loss of consciousness, subsequent encounter  Hx of  Is forgetful at baseline STM>LTM    BIMs: 13/15    CPT dress task 4/5    Meal prep task using microwave with set up and written instructions. Pt demonstrates meal prep independently with SBA during ambulation.     Sequencing steps cognitive activity. Pt needed extended time and one verbal cue for correction out of 12 tasks.     CPT med box- 4/6    Visual/auditory tasks, 2 step/4 component- Able to successfully complete 4/5 memory tasks. Unable to recall second step when distractor is added between instructions.     Pt able to provide appropriate responses to 9 out of 10 problem solving safety questions.     Pt provided appropriate responses to 7 out of 8 problem-solving safety questions.    CPT toast 4/5    Recommending ALVERTO at discharge for safety and assistance with higher level thinking tasks.    Postoperative hypothyroidism  2/2 to Hx of Menigioma  Continues on regimen of Synthroid 100mcg qDay  Lab Results   Component Value Date    TSH 1.37 02/02/2018         ALLERGIES: Penicillins and Contrast dye  PAST MEDICAL HISTORY:  has a past medical history of Arthritis; Hypercholesterolemia; Hypothyroidism; and Tremors. She also has no past  medical history of Chronic infection; Malignant hyperthermia; or Sleep apnea.  PAST SURGICAL HISTORY:  has a past surgical history that includes Hysterectomy total abdominal, bilateral salpingo-oophorectomy, combined; appendectomy; Lumpectomy breast; tonsillectomy; and Thyroidectomy (8/31/2012).  FAMILY HISTORY: family history is not on file.  SOCIAL HISTORY:  reports that she has been smoking.  She does not have any smokeless tobacco history on file. She reports that she drinks alcohol. She reports that she does not use illicit drugs.    MEDICATIONS:  Current Outpatient Prescriptions   Medication Sig Dispense Refill     levothyroxine (SYNTHROID/LEVOTHROID) 100 MCG tablet Take 100 mcg by mouth daily       gabapentin (NEURONTIN) 400 MG capsule Take 600 mg by mouth 3 times daily       simvastatin (ZOCOR) 20 MG tablet Take 20 mg by mouth At Bedtime       levETIRAcetam (KEPPRA) 500 MG tablet Take 500 mg by mouth 2 times daily       Calcium Carb-Cholecalciferol (CALCIUM-VITAMIN D) 600-400 MG-UNIT TABS Take 1 tablet by mouth 2 times daily       FLUoxetine 20 MG tablet Take 20 mg by mouth every morning       [DISCONTINUED] levothyroxine (SYNTHROID/LEVOTHROID) 100 MCG tablet Take 1 tablet (100 mcg) by mouth daily 30 tablet 3     Medications reviewed:  Medications reconciled to facility chart and changes were made to reflect current medications as identified as above med list. Below are the changes that were made:   Medications stopped since last EPIC medication reconciliation:   Medications Discontinued During This Encounter   Medication Reason     levothyroxine (SYNTHROID/LEVOTHROID) 100 MCG tablet Dose adjustment       Medications started since last [facility] medication reconciliation:  Orders Placed This Encounter   Medications     levothyroxine (SYNTHROID/LEVOTHROID) 100 MCG tablet     Sig: Take 100 mcg by mouth daily   Case Management:  I have reviewed the care plan and MDS and do agree with the plan. Patient's desire to  "return to the community is Present - currently working with relocation services.  Information reviewed:  Medications, vital signs, orders, and nursing notes.    ROS:  4 point ROS including Respiratory, CV, GI and , other than that noted in the HPI,  is negative    Exam:  Vitals: /69  Pulse 66  Temp 97.2  F (36.2  C)  Resp 18  Ht 5' 6\" (1.676 m)  Wt 148 lb 12.8 oz (67.5 kg)  SpO2 96%  BMI 24.02 kg/m2  BMI= Body mass index is 24.02 kg/(m^2).  BP Readin/61, 98/56, 94/53          HR:  48-66  GENERAL APPEARANCE:  Alert, in no distress, appears healthy, oriented, cooperative, slightly forgetful and sarcastic, middle-aged ambulating through the hallways  ENT:  Mouth and posterior oropharynx normal, moist mucous membranes, normal hearing acuity  EYES:  EOM, conjunctivae, lids, pupils and irises normal, wears glasses  NECK:  No adenopathy, masses or thyromegaly  RESP:  Respiratory effort and palpation of chest normal, lungs clear to auscultation, no respiratory distress  CV:  Palpation and auscultation of heart done, regular rate and rhythm, no murmur, rub, or gallop, no edema, +2 pedal pulses  ABDOMEN:  normal bowel sounds, soft, nontender, no hepatosplenomegaly or other masses  M/S:   Gait and station normal with use of RW; Digits and nails normal  SKIN:  Inspection of skin and subcutaneous tissue baseline, Palpation of skin and subcutaneous tissue baseline  NEURO:   Cranial nerves 2-12 are normal tested and grossly at patient's baseline  PSYCH:  oriented X 3, normal insight, judgement and memory, affect and mood normal, slightly forgetful     Lab/Diagnostic data:   CBC RESULTS:   Recent Labs   Lab Test 18   2257   14   0630   WBC  6.1  9.9   --   9.0   RBC  4.38  5.42*   --   4.63   HGB  12.6  15.7   < >  14.0   HCT  41.5  48.9*   --   42.7   MCV  94.7  90   --   92   MCH   --   29.0   --   30.2   MCHC   --   32.1   --   32.8   RDW  14.5  13.4   --   14.8   PLT  201  281   < >  " 432    < > = values in this interval not displayed.       Last Basic Metabolic Panel:  Recent Labs   Lab Test 02/02/18 01/23/18   0910  01/22/18   2257   NA  143   --   141   POTASSIUM  4.5  3.7  3.3*   CHLORIDE  109   --   104   VICKY  8.5   --   8.8   CO2  28   --   25   BUN  17   --   24   CR  0.87   --   0.80   GLC  83   --   104*       Liver Function Studies -   Recent Labs   Lab Test  01/22/18   2257   PROTTOTAL  7.3   ALBUMIN  3.3*   BILITOTAL  0.5   ALKPHOS  95   AST  18   ALT  16       TSH   Date Value Ref Range Status   02/02/2018 1.37 0.30 - 4.50 uIU/mL Final   01/22/2018 17.43 (H) 0.40 - 4.00 mU/L Final         ASSESSMENT/PLAN   Diagnosis Comments   1. Neuropathy  D/c Gabapentin  Start Lyrica 25mg PO TID  Notify NP of any cognitive changes with change of medications   2. Traumatic brain injury with loss of consciousness, subsequent encounter  Continue to work with relocation on [facility] placement  Will remain in LTC setting until placement  Continue with current POC and assistance as needed   3. Postoperative hypothyroidism  Stable on current regimen; continue medications as currently ordered.     Electronically signed by:  CHALINO Ruby CNP        Sincerely,        CHALINO Ruby CNP

## 2018-04-05 ENCOUNTER — NURSING HOME VISIT (OUTPATIENT)
Dept: GERIATRICS | Facility: CLINIC | Age: 68
End: 2018-04-05
Payer: MEDICARE

## 2018-04-05 VITALS
OXYGEN SATURATION: 96 % | HEART RATE: 76 BPM | BODY MASS INDEX: 24.33 KG/M2 | HEIGHT: 66 IN | SYSTOLIC BLOOD PRESSURE: 121 MMHG | WEIGHT: 151.4 LBS | RESPIRATION RATE: 18 BRPM | TEMPERATURE: 96.9 F | DIASTOLIC BLOOD PRESSURE: 61 MMHG

## 2018-04-05 DIAGNOSIS — G62.9 PERIPHERAL POLYNEUROPATHY: ICD-10-CM

## 2018-04-05 DIAGNOSIS — F41.8 DEPRESSION WITH ANXIETY: ICD-10-CM

## 2018-04-05 DIAGNOSIS — E03.9 HYPOTHYROIDISM, UNSPECIFIED TYPE: Primary | ICD-10-CM

## 2018-04-05 PROCEDURE — 99309 SBSQ NF CARE MODERATE MDM 30: CPT | Performed by: INTERNAL MEDICINE

## 2018-04-05 RX ORDER — PREGABALIN 25 MG/1
25 CAPSULE ORAL 3 TIMES DAILY
COMMUNITY

## 2018-04-05 NOTE — LETTER
"    4/5/2018        RE: Carri Arias  [address]   [facility] [address]        [facility]  Nursing Home Regulatory Visit  April 5, 2018    Chief Complaint   Patient presents with     longterm Regulatory       HPI:    Carri Arias is a 67 year old  (1950), who is being seen today for a federally mandated E/M visit at [facility]. Today's concerns are:    1) Hypothyroidism -- TSH 1.37 in February (had been 17.43 in setting of acute illness in January). Managed with levothyroxine 100 mcg daily  2) Peripheral Neuropathy -- pain was not controlled with gabapentin. She is now on pregabalin 25 mg TID and reports better pain control today.   3) Depression / Anxiety -- mood and spirits good today. Says she is doing \"good\". Managed with with fluoxetine 20 mg daily     ALLERGIES: Penicillins and Contrast dye    PAST MEDICAL HISTORY:   Past Medical History:   Diagnosis Date     Arthritis     knees and hips     Hypercholesterolemia      Hypothyroidism      Tremors     from thyroid?       PAST SURGICAL HISTORY:   Past Surgical History:   Procedure Laterality Date     APPENDECTOMY       HYSTERECTOMY TOTAL ABDOMINAL, BILATERAL SALPINGO-OOPHORECTOMY, COMBINED       LUMPECTOMY BREAST       THYROIDECTOMY  8/31/2012    Procedure: THYROIDECTOMY;  Total Thyroidectomy;  Surgeon: Melany Arellano MD;  Location: [facility] OR     TONSILLECTOMY         FAMILY HISTORY:   No family history on file.    SOCIAL HISTORY:   Lives in a SNF    MEDICATIONS:  Current Outpatient Prescriptions   Medication Sig Dispense Refill     pregabalin (LYRICA) 25 MG capsule Take 25 mg by mouth 3 times daily UPDATE NP W/ANY CHANGE IN MENTATION.       levothyroxine (SYNTHROID/LEVOTHROID) 100 MCG tablet Take 100 mcg by mouth daily       simvastatin (ZOCOR) 20 MG tablet Take 20 mg by mouth At Bedtime       levETIRAcetam (KEPPRA) 500 MG tablet Take 500 mg by mouth 2 times daily       Calcium " "Carb-Cholecalciferol (CALCIUM-VITAMIN D) 600-400 MG-UNIT TABS Take 1 tablet by mouth 2 times daily       FLUoxetine 20 MG tablet Take 20 mg by mouth every morning         Medications reviewed:  Medications reconciled to facility chart and changes were made to reflect current medications as identified as above med list. Below are the changes that were made:   Medications stopped since last EPIC medication reconciliation:   Medications Discontinued During This Encounter   Medication Reason     gabapentin (NEURONTIN) 400 MG capsule Discontinued by another Health Care Provider     Medications started since last [facility] medication reconciliation:  Orders Placed This Encounter   Medications     pregabalin (LYRICA) 25 MG capsule     Sig: Take 25 mg by mouth 3 times daily UPDATE NP W/ANY CHANGE IN MENTATION.     Case Management:  I have reviewed the care plan and MDS and do agree with the plan.   Information reviewed:  Medications, vital signs, orders, and nursing notes.    ROS:  4 point ROS neg other than the symptoms noted above in the HPI.    PHYSICAL EXAM:  /61  Pulse 76  Temp 96.9  F (36.1  C)  Resp 18  Ht 5' 6\" (1.676 m)  Wt 151 lb 6.4 oz (68.7 kg)  SpO2 96%  BMI 24.44 kg/m2  Gen: laying in bed, alert, cooperative and in no acute distress  Resp; breathing non-labored  GI: abdomen soft, not-tender  Ext: no LE edema  Neuro: CX II-XII grossly in tact; ROM in all four extremities grossly in tact  Psych: alert and oriented to self and general situation; normal affect    Lab/Diagnostic data:  Reviewed as per Epic    ASSESSMENT/PLAN    1) Hypothyroidism   TSH 1.37 in February (had been 17.43 in setting of acute illness in January).   -- continues on levothyroxine 100 mcg daily  -- consider repeat TSH in next 6 mos to ensure stability, goal TSH 3-5    2) Peripheral Neuropathy   Pain was not controlled with gabapentin. She is now on pregabalin and reports better pain control today.   -- continues on pregabalin 25 mg " "TID     3) Depression / Anxiety   Mood and spirits good today. Says she is doing \"good\".   -- continues on fluoxetine 20 mg daily   -- supportive cares       Electronically signed by:  Nikki Osorio MD        "

## 2018-04-05 NOTE — PROGRESS NOTES
"[facility]  Nursing Home Regulatory Visit  April 5, 2018    Chief Complaint   Patient presents with     [facility] Regulatory       HPI:    Carri Arias is a 67 year old  (1950), who is being seen today for a federally mandated E/M visit at [facility]. Today's concerns are:    1) Hypothyroidism -- TSH 1.37 in February (had been 17.43 in setting of acute illness in January). Managed with levothyroxine 100 mcg daily  2) Peripheral Neuropathy -- pain was not controlled with gabapentin. She is now on pregabalin 25 mg TID and reports better pain control today.   3) Depression / Anxiety -- mood and spirits good today. Says she is doing \"good\". Managed with with fluoxetine 20 mg daily     ALLERGIES: Penicillins and Contrast dye    PAST MEDICAL HISTORY:   Past Medical History:   Diagnosis Date     Arthritis     knees and hips     Hypercholesterolemia      Hypothyroidism      Tremors     from thyroid?       PAST SURGICAL HISTORY:   Past Surgical History:   Procedure Laterality Date     APPENDECTOMY       HYSTERECTOMY TOTAL ABDOMINAL, BILATERAL SALPINGO-OOPHORECTOMY, COMBINED       LUMPECTOMY BREAST       THYROIDECTOMY  8/31/2012    Procedure: THYROIDECTOMY;  Total Thyroidectomy;  Surgeon: Melany Arellano MD;  Location: [facility] OR     TONSILLECTOMY         FAMILY HISTORY:   No family history on file.    SOCIAL HISTORY:   Lives in a SNF    MEDICATIONS:  Current Outpatient Prescriptions   Medication Sig Dispense Refill     pregabalin (LYRICA) 25 MG capsule Take 25 mg by mouth 3 times daily UPDATE NP W/ANY CHANGE IN MENTATION.       levothyroxine (SYNTHROID/LEVOTHROID) 100 MCG tablet Take 100 mcg by mouth daily       simvastatin (ZOCOR) 20 MG tablet Take 20 mg by mouth At Bedtime       levETIRAcetam (KEPPRA) 500 MG tablet Take 500 mg by mouth 2 times daily       Calcium Carb-Cholecalciferol (CALCIUM-VITAMIN D) 600-400 MG-UNIT TABS Take 1 tablet by mouth 2 times daily       FLUoxetine 20 MG " "tablet Take 20 mg by mouth every morning         Medications reviewed:  Medications reconciled to facility chart and changes were made to reflect current medications as identified as above med list. Below are the changes that were made:   Medications stopped since last EPIC medication reconciliation:   Medications Discontinued During This Encounter   Medication Reason     gabapentin (NEURONTIN) 400 MG capsule Discontinued by another Health Care Provider     Medications started since last [facility] medication reconciliation:  Orders Placed This Encounter   Medications     pregabalin (LYRICA) 25 MG capsule     Sig: Take 25 mg by mouth 3 times daily UPDATE NP W/ANY CHANGE IN MENTATION.     Case Management:  I have reviewed the care plan and MDS and do agree with the plan.   Information reviewed:  Medications, vital signs, orders, and nursing notes.    ROS:  4 point ROS neg other than the symptoms noted above in the HPI.    PHYSICAL EXAM:  /61  Pulse 76  Temp 96.9  F (36.1  C)  Resp 18  Ht 5' 6\" (1.676 m)  Wt 151 lb 6.4 oz (68.7 kg)  SpO2 96%  BMI 24.44 kg/m2  Gen: laying in bed, alert, cooperative and in no acute distress  Resp; breathing non-labored  GI: abdomen soft, not-tender  Ext: no LE edema  Neuro: CX II-XII grossly in tact; ROM in all four extremities grossly in tact  Psych: alert and oriented to self and general situation; normal affect    Lab/Diagnostic data:  Reviewed as per Epic    ASSESSMENT/PLAN    1) Hypothyroidism   TSH 1.37 in February (had been 17.43 in setting of acute illness in January).   -- continues on levothyroxine 100 mcg daily  -- consider repeat TSH in next 6 mos to ensure stability, goal TSH 3-5    2) Peripheral Neuropathy   Pain was not controlled with gabapentin. She is now on pregabalin and reports better pain control today.   -- continues on pregabalin 25 mg TID     3) Depression / Anxiety   Mood and spirits good today. Says she is doing \"good\".   -- continues on fluoxetine 20 " mg daily   -- supportive cares       Electronically signed by:  Nikki Osorio MD

## 2018-06-21 ENCOUNTER — NURSING HOME VISIT (OUTPATIENT)
Dept: GERIATRICS | Facility: CLINIC | Age: 68
End: 2018-06-21
Payer: MEDICARE

## 2018-06-21 VITALS
BODY MASS INDEX: 23.78 KG/M2 | OXYGEN SATURATION: 97 % | HEART RATE: 59 BPM | DIASTOLIC BLOOD PRESSURE: 48 MMHG | SYSTOLIC BLOOD PRESSURE: 110 MMHG | TEMPERATURE: 98.2 F | RESPIRATION RATE: 22 BRPM | WEIGHT: 148 LBS | HEIGHT: 66 IN

## 2018-06-21 DIAGNOSIS — E78.49 OTHER HYPERLIPIDEMIA: ICD-10-CM

## 2018-06-21 DIAGNOSIS — R41.0 CONFUSION: Primary | ICD-10-CM

## 2018-06-21 DIAGNOSIS — S06.9X9D TRAUMATIC BRAIN INJURY WITH LOSS OF CONSCIOUSNESS, SUBSEQUENT ENCOUNTER: ICD-10-CM

## 2018-06-21 DIAGNOSIS — F43.21 ADJUSTMENT DISORDER WITH DEPRESSED MOOD: ICD-10-CM

## 2018-06-21 DIAGNOSIS — Z71.89 ADVANCED DIRECTIVES, COUNSELING/DISCUSSION: Chronic | ICD-10-CM

## 2018-06-21 DIAGNOSIS — E04.2 MULTINODULAR GOITER: ICD-10-CM

## 2018-06-21 DIAGNOSIS — E89.0 POSTOPERATIVE HYPOTHYROIDISM: ICD-10-CM

## 2018-06-21 DIAGNOSIS — Z13.6 SCREENING FOR HYPERTENSION: ICD-10-CM

## 2018-06-21 DIAGNOSIS — G93.41 ACUTE METABOLIC ENCEPHALOPATHY: ICD-10-CM

## 2018-06-21 DIAGNOSIS — Z98.890 S/P RESECTION OF MENINGIOMA: ICD-10-CM

## 2018-06-21 DIAGNOSIS — E89.0 S/P THYROIDECTOMY: ICD-10-CM

## 2018-06-21 DIAGNOSIS — R53.81 PHYSICAL DECONDITIONING: ICD-10-CM

## 2018-06-21 DIAGNOSIS — Z86.79 H/O SUBARACHNOID HEMORRHAGE: ICD-10-CM

## 2018-06-21 DIAGNOSIS — G62.9 NEUROPATHY: ICD-10-CM

## 2018-06-21 DIAGNOSIS — Z86.018 S/P RESECTION OF MENINGIOMA: ICD-10-CM

## 2018-06-21 PROCEDURE — 99318 ZZC ANNUAL NURSING FAC ASSESSMNT, STABLE: CPT | Performed by: NURSE PRACTITIONER

## 2018-06-21 NOTE — LETTER
6/21/2018        RE: Carri Arias  [address]  [facility] [address]        [facility]  Chief Complaint   Patient presents with     Annual Comprehensive Nursing Home       [facility] Medical Record Number:  [id number]    HPI:    Carri Arias is a 67 year old  (1950), who is being seen today for an annual comprehensive visit [facility].  HPI information obtained from: facility chart records, facility staff, patient report and [facility] chart review.  Today's concerns are:  Traumatic brain injury with loss of consciousness, subsequent encounter  H/O subarachnoid hemorrhage  Patient remains forgetful at baseline - responds appropriately to questions  Continues to reside in LTC facility with assistance with cares as needed - does complete most ADLs independently  BIMs: 14/15    Confusion  Acute metabolic encephalopathy  Resolved  2/2 use of Gabapentin - resolved with discontinuation - has tolerated Lyrica without incident    Neuropathy  Chronic  Resident continues to note severe neuropathy on feet  She is currently on regimen of 25mg BID    Multinodular goiter  Postoperative hypothyroidism  S/P thyroidectomy  Prior to admission to facility  No issues with cold intolerance or increased fatigue since admission - has remained control on regimen of synthroid daily  TSH   Date Value Ref Range Status   02/02/2018 1.37 0.30 - 4.50 uIU/mL Final   01/22/2018 17.43 (H) 0.40 - 4.00 mU/L Final     Other hyperlipidemia  Chronic  On regimen of Simvastatin  LIPID PANEL (02/06/2017 10:38 AM)  LIPID PANEL (02/06/2017 10:38 AM)   Component Value Ref Range   CHOLESTEROL,TOTAL 183 100 - 199 mg/dL   TRIGLYCERIDES 73 <150 mg/dL   HDL CHOLESTEROL 55 >40 mg/dL   NON-HDL CHOLESTEROL 128 <145 mg/dl   CHOL/HDL RATIO  3.33 <4.50    LDL CHOLESTEROL 113 <=130 mg/dL   PATIENT STATUS  NOT GIVEN      Adjustment disorder with depressed mood  Chronic  Resident denies  ongoing s/sx of depression  Managed on regimen of Fluoxetine  Last PHQ9: 00/27    S/P resection of meningioma  Hx of  Regimen of Keppra for seizure prophylaxis    Physical deconditioning  Recent fall - therapies restarted on 6/18  -PT/OT following    Advance Care Planning  Patient currently DNR/I she wishes to continue with this code status at this time    Screening for hypertension  Based on JNC-8 goals,  patients age of 67 year old, no presence of diabetes or CKD, and goals of care goal BP is  <140/90 mm Hg. Patient is stable and continue without pharmacological invention with routine assessment..    ALLERGIES: Penicillins and Contrast dye  PROBLEM LIST:  Patient Active Problem List   Diagnosis     Multinodular goiter     S/P thyroidectomy     Sacral fracture (H)     Confusion     Acute metabolic encephalopathy     Postoperative hypothyroidism     S/P resection of meningioma     H/O subarachnoid hemorrhage     TBI (traumatic brain injury) (H)     Neuropathy     Other hyperlipidemia     Adjustment disorder with depressed mood     Physical deconditioning     PAST MEDICAL HISTORY:  has a past medical history of Arthritis; Hypercholesterolemia; Hypothyroidism; and Tremors. She also has no past medical history of Chronic infection; Malignant hyperthermia; or Sleep apnea.  PAST SURGICAL HISTORY:  has a past surgical history that includes Hysterectomy total abdominal, bilateral salpingo-oophorectomy, combined; appendectomy; Lumpectomy breast; tonsillectomy; and Thyroidectomy (8/31/2012).  FAMILY HISTORY: family history is not on file.  SOCIAL HISTORY:  reports that she has been smoking.  She does not have any smokeless tobacco history on file. She reports that she drinks alcohol. She reports that she does not use illicit drugs.  IMMUNIZATIONS:  Most Recent Immunizations   Administered Date(s) Administered     Influenza (IIV3) PF 11/22/2006     TD (ADULT, 7+) 06/25/2004     TDAP Vaccine (Adacel) 03/10/2017     TDAP  Vaccine (Boostrix) 03/10/2017   Above immunizations pulled from [facility]. MIIC and facility records also reconciled. Outstanding information sent to  to update [facility].  Future immunizations needed:  PPSV23, PCV13, yearly influenza per facility protocol and Shingles vaccination  MEDICATIONS:  Current Outpatient Prescriptions   Medication Sig Dispense Refill     Calcium Carb-Cholecalciferol (CALCIUM-VITAMIN D) 600-400 MG-UNIT TABS Take 1 tablet by mouth 2 times daily       FLUoxetine 20 MG tablet Take 20 mg by mouth every morning       levETIRAcetam (KEPPRA) 500 MG tablet Take 500 mg by mouth 2 times daily       levothyroxine (SYNTHROID/LEVOTHROID) 100 MCG tablet Take 100 mcg by mouth daily       pregabalin (LYRICA) 25 MG capsule Take 25 mg by mouth 2 times daily UPDATE NP W/ANY CHANGE IN MENTATION.        simvastatin (ZOCOR) 20 MG tablet Take 20 mg by mouth At Bedtime       Medications reviewed:  Medications reconciled to facility chart and changes were made to reflect current medications as identified as above med list. Below are the changes that were made:   Medications stopped since last EPIC medication reconciliation:   There are no discontinued medications.    Medications started since last [facility] medication reconciliation:  No orders of the defined types were placed in this encounter.    Case Management:  I have reviewed the facility/SNF care plan/MDS which was done 5/2/18, including the falls risk, nutrition and pain screening. I also reviewed the current immunizations, and preventive care..Future cancer screening indicated is breast cancer screening and provider and pt will formulate a POC Patient's desire to return to the community is not present. Current Level of Care is appropriate.    Advance Directive Discussion:    I reviewed the current advanced directives as reflected in EPIC, the POLST and the facility chart, and verified the congruency of orders. I spoke with the resident  "discussed the plan of Care.  I did review the advance directives with the resident.     Team Discussion:  I communicated with the appropriate disciplines involved with the Plan of Care:   Nursing      Patient Goal:  Patient's goal is to control neuropathic pain.    Information reviewed:  Medications, vital signs, orders, and nursing notes.    ROS:  10 point ROS of systems including Constitutional, Eyes, Respiratory, Cardiovascular, Gastroenterology, Genitourinary, Integumentary, Muscularskeletal, Psychiatric were all negative except for pertinent positives noted in my HPI.    Exam:  /48  Pulse 59  Temp 98.2  F (36.8  C)  Resp 22  Ht 5' 6\" (1.676 m)  Wt 148 lb (67.1 kg)  SpO2 97%  BMI 23.89 kg/m2  GENERAL APPEARANCE:  Alert, in no distress, appears healthy, oriented, cooperative, slightly forgetful and sarcastic, middle-aged ambulating through the hallways with use of 4WW  ENT:  Mouth and posterior oropharynx normal, moist mucous membranes, normal hearing acuity  EYES:  EOM, conjunctivae, lids, pupils and irises normal, wears glasses  NECK:  No adenopathy, masses or thyromegaly  RESP:  Respiratory effort and palpation of chest normal, lungs clear to auscultation, no respiratory distress  CV:  Palpation and auscultation of heart done, regular rate and rhythm, no murmur, rub, or gallop, no edema, +2 pedal pulses  ABDOMEN:  normal bowel sounds, soft, nontender, no hepatosplenomegaly or other masses  M/S:   Gait and station normal with use of RW; Digits and nails normal  SKIN:  Inspection of skin and subcutaneous tissue baseline, Palpation of skin and subcutaneous tissue baseline  NEURO:   Cranial nerves 2-12 are normal tested and grossly at patient's baseline  PSYCH:  oriented X 3, normal insight, judgement and memory, affect and mood normal, slightly forgetful     BP Reading:                                         HR:  49-70  104/60  118/63  122/68    Lab/Diagnostic data:   CBC RESULTS:   Recent Labs "   Lab Test 02/02/18 01/22/18 2257   04/12/14   0630   WBC  6.1  9.9   --   9.0   RBC  4.38  5.42*   --   4.63   HGB  12.6  15.7   < >  14.0   HCT  41.5  48.9*   --   42.7   MCV  94.7  90   --   92   MCH   --   29.0   --   30.2   MCHC   --   32.1   --   32.8   RDW  14.5  13.4   --   14.8   PLT  201  281   < >  432    < > = values in this interval not displayed.       Last Basic Metabolic Panel:  Recent Labs   Lab Test 02/02/18 01/23/18   0910  01/22/18 2257   NA  143   --   141   POTASSIUM  4.5  3.7  3.3*   CHLORIDE  109   --   104   VICKY  8.5   --   8.8   CO2  28   --   25   BUN  17   --   24   CR  0.87   --   0.80   GLC  83   --   104*       Liver Function Studies -   Recent Labs   Lab Test  01/22/18 2257   PROTTOTAL  7.3   ALBUMIN  3.3*   BILITOTAL  0.5   ALKPHOS  95   AST  18   ALT  16       TSH   Date Value Ref Range Status   02/02/2018 1.37 0.30 - 4.50 uIU/mL Final   01/22/2018 17.43 (H) 0.40 - 4.00 mU/L Final         ASSESSMENT/PLAN   Diagnosis Comments   1. Traumatic brain injury with loss of consciousness, subsequent encounter  H/O subarachnoid hemorrhage  Stable  Continue with current placement - would be appropriate for custodial setting if available given high level of independence   4. Confusion   Acute metabolic encephalopathy  Resolved  BMP   5. Neuropathy  Increase Lyrica to 25mg TID   6. Multinodular goiter   Postoperative hypothyroidism    S/P thyroidectomy     TSH  Continue current Synthroid dosing at this time   9. Other hyperlipidemia  Fasting lipid panel  Continue current regimen at this time   10. Adjustment disorder with depressed mood  Stable on current regimen; continue medications as currently ordered.  LFTs  CBC   11. S/P resection of meningioma  Noted  Keppra level   12. Physical deconditioning  Continue therapies - adv per their recommendations   13. Advance Care Planning  POLST re-signed and sent into EPIC   14. Screening for hypertension  Stable without medical intervention      Electronically signed by:  CHALINO Ruby      Sincerely,        CHALINO Ruby CNP

## 2018-06-21 NOTE — PROGRESS NOTES
[facility]  Chief Complaint   Patient presents with     Annual Comprehensive Nursing Home       [facility] Medical Record Number:  [id number]    HPI:    Carri Arias is a 67 year old  (1950), who is being seen today for an annual comprehensive visit at [facility] [place].  HPI information obtained from: facility chart records, facility staff, patient report and [facility] chart review.  Today's concerns are:  Traumatic brain injury with loss of consciousness, subsequent encounter  H/O subarachnoid hemorrhage  Patient remains forgetful at baseline - responds appropriately to questions  Continues to reside in LTC facility with assistance with cares as needed - does complete most ADLs independently  BIMs: 14/15    Confusion  Acute metabolic encephalopathy  Resolved  2/2 use of Gabapentin - resolved with discontinuation - has tolerated Lyrica without incident    Neuropathy  Chronic  Resident continues to note severe neuropathy on feet  She is currently on regimen of 25mg BID    Multinodular goiter  Postoperative hypothyroidism  S/P thyroidectomy  Prior to admission to facility  No issues with cold intolerance or increased fatigue since admission - has remained control on regimen of synthroid daily  TSH   Date Value Ref Range Status   02/02/2018 1.37 0.30 - 4.50 uIU/mL Final   01/22/2018 17.43 (H) 0.40 - 4.00 mU/L Final     Other hyperlipidemia  Chronic  On regimen of Simvastatin  LIPID PANEL (02/06/2017 10:38 AM)  LIPID PANEL (02/06/2017 10:38 AM)   Component Value Ref Range   CHOLESTEROL,TOTAL 183 100 - 199 mg/dL   TRIGLYCERIDES 73 <150 mg/dL   HDL CHOLESTEROL 55 >40 mg/dL   NON-HDL CHOLESTEROL 128 <145 mg/dl   CHOL/HDL RATIO  3.33 <4.50    LDL CHOLESTEROL 113 <=130 mg/dL   PATIENT STATUS  NOT GIVEN      Adjustment disorder with depressed mood  Chronic  Resident denies ongoing s/sx of depression  Managed on regimen of Fluoxetine  Last PHQ9: 00/27    S/P resection of  meningioma  Hx of  Regimen of Keppra for seizure prophylaxis    Physical deconditioning  Recent fall - therapies restarted on 6/18  -PT/OT following    Advance Care Planning  Patient currently DNR/I she wishes to continue with this code status at this time    Screening for hypertension  Based on JNC-8 goals,  patients age of 67 year old, no presence of diabetes or CKD, and goals of care goal BP is  <140/90 mm Hg. Patient is stable and continue without pharmacological invention with routine assessment..    ALLERGIES: Penicillins and Contrast dye  PROBLEM LIST:  Patient Active Problem List   Diagnosis     Multinodular goiter     S/P thyroidectomy     Sacral fracture (H)     Confusion     Acute metabolic encephalopathy     Postoperative hypothyroidism     S/P resection of meningioma     H/O subarachnoid hemorrhage     TBI (traumatic brain injury) (H)     Neuropathy     Other hyperlipidemia     Adjustment disorder with depressed mood     Physical deconditioning     PAST MEDICAL HISTORY:  has a past medical history of Arthritis; Hypercholesterolemia; Hypothyroidism; and Tremors. She also has no past medical history of Chronic infection; Malignant hyperthermia; or Sleep apnea.  PAST SURGICAL HISTORY:  has a past surgical history that includes Hysterectomy total abdominal, bilateral salpingo-oophorectomy, combined; appendectomy; Lumpectomy breast; tonsillectomy; and Thyroidectomy (8/31/2012).  FAMILY HISTORY: family history is not on file.  SOCIAL HISTORY:  reports that she has been smoking.  She does not have any smokeless tobacco history on file. She reports that she drinks alcohol. She reports that she does not use illicit drugs.  IMMUNIZATIONS:  Most Recent Immunizations   Administered Date(s) Administered     Influenza (IIV3) PF 11/22/2006     TD (ADULT, 7+) 06/25/2004     TDAP Vaccine (Adacel) 03/10/2017     TDAP Vaccine (Boostrix) 03/10/2017   Above immunizations pulled from [facility]. [facility] and facility records  also reconciled. Outstanding information sent to  to update [facility].  Future immunizations needed:  PPSV23, PCV13, yearly influenza per facility protocol and Shingles vaccination  MEDICATIONS:  Current Outpatient Prescriptions   Medication Sig Dispense Refill     Calcium Carb-Cholecalciferol (CALCIUM-VITAMIN D) 600-400 MG-UNIT TABS Take 1 tablet by mouth 2 times daily       FLUoxetine 20 MG tablet Take 20 mg by mouth every morning       levETIRAcetam (KEPPRA) 500 MG tablet Take 500 mg by mouth 2 times daily       levothyroxine (SYNTHROID/LEVOTHROID) 100 MCG tablet Take 100 mcg by mouth daily       pregabalin (LYRICA) 25 MG capsule Take 25 mg by mouth 2 times daily UPDATE NP W/ANY CHANGE IN MENTATION.        simvastatin (ZOCOR) 20 MG tablet Take 20 mg by mouth At Bedtime       Medications reviewed:  Medications reconciled to facility chart and changes were made to reflect current medications as identified as above med list. Below are the changes that were made:   Medications stopped since last EPIC medication reconciliation:   There are no discontinued medications.    Medications started since last [facility] medication reconciliation:  No orders of the defined types were placed in this encounter.    Case Management:  I have reviewed the facility/SNF care plan/MDS which was done 5/2/18, including the falls risk, nutrition and pain screening. I also reviewed the current immunizations, and preventive care..Future cancer screening indicated is breast cancer screening and provider and pt will formulate a POC Patient's desire to return to the community is not present. Current Level of Care is appropriate.    Advance Directive Discussion:    I reviewed the current advanced directives as reflected in EPIC, the POLST and the facility chart, and verified the congruency of orders. I spoke with the resident discussed the plan of Care.  I did review the advance directives with the resident.     Team  "Discussion:  I communicated with the appropriate disciplines involved with the Plan of Care:   Nursing      Patient Goal:  Patient's goal is to control neuropathic pain.    Information reviewed:  Medications, vital signs, orders, and nursing notes.    ROS:  10 point ROS of systems including Constitutional, Eyes, Respiratory, Cardiovascular, Gastroenterology, Genitourinary, Integumentary, Muscularskeletal, Psychiatric were all negative except for pertinent positives noted in my HPI.    Exam:  /48  Pulse 59  Temp 98.2  F (36.8  C)  Resp 22  Ht 5' 6\" (1.676 m)  Wt 148 lb (67.1 kg)  SpO2 97%  BMI 23.89 kg/m2  GENERAL APPEARANCE:  Alert, in no distress, appears healthy, oriented, cooperative, slightly forgetful and sarcastic, middle-aged ambulating through the hallways with use of 4WW  ENT:  Mouth and posterior oropharynx normal, moist mucous membranes, normal hearing acuity  EYES:  EOM, conjunctivae, lids, pupils and irises normal, wears glasses  NECK:  No adenopathy, masses or thyromegaly  RESP:  Respiratory effort and palpation of chest normal, lungs clear to auscultation, no respiratory distress  CV:  Palpation and auscultation of heart done, regular rate and rhythm, no murmur, rub, or gallop, no edema, +2 pedal pulses  ABDOMEN:  normal bowel sounds, soft, nontender, no hepatosplenomegaly or other masses  M/S:   Gait and station normal with use of RW; Digits and nails normal  SKIN:  Inspection of skin and subcutaneous tissue baseline, Palpation of skin and subcutaneous tissue baseline  NEURO:   Cranial nerves 2-12 are normal tested and grossly at patient's baseline  PSYCH:  oriented X 3, normal insight, judgement and memory, affect and mood normal, slightly forgetful     BP Reading:                                         HR:  49-70  104/60  118/63  122/68    Lab/Diagnostic data:   CBC RESULTS:   Recent Labs   Lab Test 02/02/18 01/22/18   2257   04/12/14   0630   WBC  6.1  9.9   --   9.0   RBC  4.38  " 5.42*   --   4.63   HGB  12.6  15.7   < >  14.0   HCT  41.5  48.9*   --   42.7   MCV  94.7  90   --   92   MCH   --   29.0   --   30.2   MCHC   --   32.1   --   32.8   RDW  14.5  13.4   --   14.8   PLT  201  281   < >  432    < > = values in this interval not displayed.       Last Basic Metabolic Panel:  Recent Labs   Lab Test 02/02/18 01/23/18   0910  01/22/18   2257   NA  143   --   141   POTASSIUM  4.5  3.7  3.3*   CHLORIDE  109   --   104   VICKY  8.5   --   8.8   CO2  28   --   25   BUN  17   --   24   CR  0.87   --   0.80   GLC  83   --   104*       Liver Function Studies -   Recent Labs   Lab Test  01/22/18   2257   PROTTOTAL  7.3   ALBUMIN  3.3*   BILITOTAL  0.5   ALKPHOS  95   AST  18   ALT  16       TSH   Date Value Ref Range Status   02/02/2018 1.37 0.30 - 4.50 uIU/mL Final   01/22/2018 17.43 (H) 0.40 - 4.00 mU/L Final         ASSESSMENT/PLAN   Diagnosis Comments   1. Traumatic brain injury with loss of consciousness, subsequent encounter  H/O subarachnoid hemorrhage  Stable  Continue with current placement - would be appropriate for [facility] setting if available given high level of independence   4. Confusion   Acute metabolic encephalopathy  Resolved  BMP   5. Neuropathy  Increase Lyrica to 25mg TID   6. Multinodular goiter   Postoperative hypothyroidism    S/P thyroidectomy     TSH  Continue current Synthroid dosing at this time   9. Other hyperlipidemia  Fasting lipid panel  Continue current regimen at this time   10. Adjustment disorder with depressed mood  Stable on current regimen; continue medications as currently ordered.  LFTs  CBC   11. S/P resection of meningioma  Noted  Keppra level   12. Physical deconditioning  Continue therapies - adv per their recommendations   13. Advance Care Planning  POLST re-signed and sent into EPIC   14. Screening for hypertension  Stable without medical intervention     Electronically signed by:  CHALINO Ruby CN

## 2018-06-22 ENCOUNTER — TRANSFERRED RECORDS (OUTPATIENT)
Dept: HEALTH INFORMATION MANAGEMENT | Facility: CLINIC | Age: 68
End: 2018-06-22

## 2018-06-22 LAB
ALBUMIN SERPL-MCNC: 2.8 G/DL (ref 3.4–5)
ALP SERPL-CCNC: 108 U/L (ref 40–150)
ALT SERPL-CCNC: 37 U/L (ref 9–55)
AST SERPL-CCNC: 31 U/L (ref 10–40)
BILIRUB SERPL-MCNC: 0.4 MG/DL (ref 0.2–1.2)
BUN SERPL-MCNC: 20 MG/DL (ref 9–26)
CALCIUM SERPL-MCNC: 8.9 MG/DL (ref 8.4–10.4)
CHLORIDE SERPLBLD-SCNC: 109 MMOL/L (ref 98–109)
CHOLEST SERPL-MCNC: 151 MG/DL (ref 0–199)
CO2 SERPL-SCNC: 28 MMOL/L (ref 22–31)
CREAT SERPL-MCNC: 0.61 MG/DL (ref 0.55–1.02)
DIFFERENTIAL: NORMAL
ERYTHROCYTE [DISTWIDTH] IN BLOOD BY AUTOMATED COUNT: 13.8 % (ref 11–15)
GFR SERPL CREATININE-BSD FRML MDRD: >60 ML/MIN/1.73M2
GLUCOSE SERPL-MCNC: 75 MG/DL (ref 70–100)
HCT VFR BLD AUTO: 42.1 % (ref 35–46)
HDLC SERPL-MCNC: 54 MG/DL
HEMOGLOBIN: 13.2 G/DL (ref 11.8–15.5)
LDLC SERPL CALC-MCNC: 85 MG/DL (ref 19–130)
MCV RBC AUTO: 89.2 FL (ref 80–100)
NONHDLC SERPL-MCNC: 97 MG/DL (ref 0–159)
PLATELET # BLD AUTO: 189 K/CMM (ref 140–450)
POTASSIUM SERPL-SCNC: 4.3 MMOL/L (ref 3.5–5.2)
PROT SERPL-MCNC: 5.8 G/DL (ref 6.4–8.3)
RBC # BLD AUTO: 4.72 M/CMM (ref 3.7–5.2)
SODIUM SERPL-SCNC: 144 MMOL/L (ref 136–145)
TRIGL SERPL-MCNC: 62 MG/DL (ref 4–149)
TSH SERPL-ACNC: <0.02 UIU/ML (ref 0.3–4.5)
WBC # BLD AUTO: 5.7 K/CMM (ref 3.8–11)

## 2018-06-26 ENCOUNTER — DOCUMENTATION ONLY (OUTPATIENT)
Dept: OTHER | Facility: CLINIC | Age: 68
End: 2018-06-26

## 2018-06-26 DIAGNOSIS — Z71.89 ADVANCED DIRECTIVES, COUNSELING/DISCUSSION: Chronic | ICD-10-CM

## 2018-08-02 VITALS
HEIGHT: 66 IN | SYSTOLIC BLOOD PRESSURE: 120 MMHG | TEMPERATURE: 98.8 F | BODY MASS INDEX: 25.31 KG/M2 | HEART RATE: 54 BPM | WEIGHT: 157.5 LBS | OXYGEN SATURATION: 95 % | RESPIRATION RATE: 16 BRPM | DIASTOLIC BLOOD PRESSURE: 47 MMHG

## 2018-08-03 ENCOUNTER — NURSING HOME VISIT (OUTPATIENT)
Dept: GERIATRICS | Facility: CLINIC | Age: 68
End: 2018-08-03
Payer: MEDICARE

## 2018-08-03 DIAGNOSIS — Z86.011 H/O MENINGIOMA OF THE BRAIN: ICD-10-CM

## 2018-08-03 DIAGNOSIS — G62.9 PERIPHERAL POLYNEUROPATHY: Primary | ICD-10-CM

## 2018-08-03 DIAGNOSIS — E78.5 HYPERLIPIDEMIA, UNSPECIFIED HYPERLIPIDEMIA TYPE: ICD-10-CM

## 2018-08-03 DIAGNOSIS — S06.9X9S TRAUMATIC BRAIN INJURY WITH LOSS OF CONSCIOUSNESS, SEQUELA (H): ICD-10-CM

## 2018-08-03 PROCEDURE — 99309 SBSQ NF CARE MODERATE MDM 30: CPT | Performed by: INTERNAL MEDICINE

## 2018-08-03 NOTE — PROGRESS NOTES
[facility]  Nursing Home Regulatory Visit  August 3, 2018    Chief Complaint   Patient presents with     retirement Regulatory       HPI:    Carri Arias is a 67 year old  (1950), who is being seen today for a federally mandated E/M visit at [facility]. Today's concerns are:    1) Peripheral Neuropathy -- Managed with pregabalin 25 mg TID and seems to be tolerating well. Gabapentin did not provide her much if any pain relief and caused AMS/confusion)  2) HLD -- managed with simvastatin 20 mg at bedtime. Lipid panel on 6/22/18: TChol 151, LDL 85, HDL 54 and TG 62  3) TBI / Meningioma s/p Resection (2017)-- continues on levetiracetam 500 mg BID for seizure ppx    ALLERGIES: Penicillins and Contrast dye    PAST MEDICAL HISTORY:   Past Medical History:   Diagnosis Date     Arthritis     knees and hips     Hypercholesterolemia      Hypothyroidism      Tremors     from thyroid?       PAST SURGICAL HISTORY:   Past Surgical History:   Procedure Laterality Date     APPENDECTOMY       HYSTERECTOMY TOTAL ABDOMINAL, BILATERAL SALPINGO-OOPHORECTOMY, COMBINED       LUMPECTOMY BREAST       THYROIDECTOMY  8/31/2012    Procedure: THYROIDECTOMY;  Total Thyroidectomy;  Surgeon: Melany Arellano MD;  Location: [facility] OR     TONSILLECTOMY         FAMILY HISTORY:   No family history on file.    SOCIAL HISTORY:   Lives in a SNF    MEDICATIONS:  Current Outpatient Prescriptions   Medication Sig Dispense Refill     Calcium Carb-Cholecalciferol (CALCIUM-VITAMIN D) 600-400 MG-UNIT TABS Take 1 tablet by mouth 2 times daily       FLUoxetine 20 MG tablet Take 20 mg by mouth every morning       levETIRAcetam (KEPPRA) 500 MG tablet Take 500 mg by mouth 2 times daily       levothyroxine (SYNTHROID/LEVOTHROID) 100 MCG tablet Take 100 mcg by mouth daily       pregabalin (LYRICA) 25 MG capsule Take 25 mg by mouth 3 times daily UPDATE NP W/ANY CHANGE IN MENTATION.        simvastatin (ZOCOR) 20 MG tablet Take 20 mg  "by mouth At Bedtime         Medications reviewed:  Medications reconciled to facility chart and changes were made to reflect current medications as identified as above med list. Below are the changes that were made:   Medications stopped since last EPIC medication reconciliation:   There are no discontinued medications.  Medications started since last [facility] medication reconciliation:  No orders of the defined types were placed in this encounter.    Case Management:  I have reviewed the care plan and MDS and do agree with the plan.   Information reviewed:  Medications, vital signs, orders, and nursing notes.    ROS:  4 point ROS neg other than the symptoms noted above in the HPI.    PHYSICAL EXAM:  /47  Pulse 54  Temp 98.8  F (37.1  C)  Resp 16  Ht 5' 6\" (1.676 m)  Wt 157 lb 8 oz (71.4 kg)  SpO2 95%  BMI 25.42 kg/m2  Gen: laying in bed in no acute distress  Resp; breathing non-labored  GI: abdomen soft, not-tender  Ext: no LE edema  Neuro: CX II-XII grossly in tact; ROM in all four extremities grossly in tact  Psych: alert and oriented to self and general situation; normal affect     Lab/Diagnostic data:  Reviewed as per Epic    ASSESSMENT/PLAN    1) Peripheral Neuropathy   Gabapentin did not provide her much if any pain relief and caused AMS/confusion  -- managed with pregabalin 25 mg TID and seems to be tolerating well    2) HLD   Lipid panel on 6/22/18: TChol 151, LDL 85, HDL 54 and TG 62  -- continues on simvastatin 20 mg at bedtime  -- annual lipid panel    3) TBI / Meningioma s/p Resection (2017)  -- continues on levetiracetam 500 mg BID for seizure ppx        Electronically signed by:  Nikki Osorio MD    "

## 2018-08-03 NOTE — LETTER
8/3/2018        RE: Carri Arias  [address]  [facility] [address]        [place] [facility]  Nursing Home Regulatory Visit  August 3, 2018    Chief Complaint   Patient presents with     prison Regulatory       HPI:    Carri Arias is a 67 year old  (1950), who is being seen today for a federally mandated E/M visit at [facility]. Today's concerns are:    1) Peripheral Neuropathy -- Managed with pregabalin 25 mg TID and seems to be tolerating well. Gabapentin did not provide her much if any pain relief and caused AMS/confusion)  2) HLD -- managed with simvastatin 20 mg at bedtime. Lipid panel on 6/22/18: TChol 151, LDL 85, HDL 54 and TG 62  3) TBI / Meningioma s/p Resection (2017)-- continues on levetiracetam 500 mg BID for seizure ppx    ALLERGIES: Penicillins and Contrast dye    PAST MEDICAL HISTORY:   Past Medical History:   Diagnosis Date     Arthritis     knees and hips     Hypercholesterolemia      Hypothyroidism      Tremors     from thyroid?       PAST SURGICAL HISTORY:   Past Surgical History:   Procedure Laterality Date     APPENDECTOMY       HYSTERECTOMY TOTAL ABDOMINAL, BILATERAL SALPINGO-OOPHORECTOMY, COMBINED       LUMPECTOMY BREAST       THYROIDECTOMY  8/31/2012    Procedure: THYROIDECTOMY;  Total Thyroidectomy;  Surgeon: Melany Arellano MD;  Location: [facility] OR     TONSILLECTOMY         FAMILY HISTORY:   No family history on file.    SOCIAL HISTORY:   Lives in a SNF    MEDICATIONS:  Current Outpatient Prescriptions   Medication Sig Dispense Refill     Calcium Carb-Cholecalciferol (CALCIUM-VITAMIN D) 600-400 MG-UNIT TABS Take 1 tablet by mouth 2 times daily       FLUoxetine 20 MG tablet Take 20 mg by mouth every morning       levETIRAcetam (KEPPRA) 500 MG tablet Take 500 mg by mouth 2 times daily       levothyroxine (SYNTHROID/LEVOTHROID) 100 MCG tablet Take 100 mcg by mouth daily       pregabalin (LYRICA) 25 MG capsule Take 25 mg by  "mouth 3 times daily UPDATE NP W/ANY CHANGE IN MENTATION.        simvastatin (ZOCOR) 20 MG tablet Take 20 mg by mouth At Bedtime         Medications reviewed:  Medications reconciled to facility chart and changes were made to reflect current medications as identified as above med list. Below are the changes that were made:   Medications stopped since last EPIC medication reconciliation:   There are no discontinued medications.  Medications started since last [facility] medication reconciliation:  No orders of the defined types were placed in this encounter.    Case Management:  I have reviewed the care plan and MDS and do agree with the plan.   Information reviewed:  Medications, vital signs, orders, and nursing notes.    ROS:  4 point ROS neg other than the symptoms noted above in the HPI.    PHYSICAL EXAM:  /47  Pulse 54  Temp 98.8  F (37.1  C)  Resp 16  Ht 5' 6\" (1.676 m)  Wt 157 lb 8 oz (71.4 kg)  SpO2 95%  BMI 25.42 kg/m2  Gen: laying in bed in no acute distress  Resp; breathing non-labored  GI: abdomen soft, not-tender  Ext: no LE edema  Neuro: CX II-XII grossly in tact; ROM in all four extremities grossly in tact  Psych: alert and oriented to self and general situation; normal affect     Lab/Diagnostic data:  Reviewed as per Epic    ASSESSMENT/PLAN    1) Peripheral Neuropathy   Gabapentin did not provide her much if any pain relief and caused AMS/confusion  -- managed with pregabalin 25 mg TID and seems to be tolerating well    2) HLD   Lipid panel on 6/22/18: TChol 151, LDL 85, HDL 54 and TG 62  -- continues on simvastatin 20 mg at bedtime  -- annual lipid panel    3) TBI / Meningioma s/p Resection (2017)  -- continues on levetiracetam 500 mg BID for seizure ppx        Electronically signed by:  Nikki Osorio MD        Sincerely,        Nikki Osorio MD    "

## 2018-08-22 VITALS
RESPIRATION RATE: 20 BRPM | OXYGEN SATURATION: 90 % | HEART RATE: 77 BPM | SYSTOLIC BLOOD PRESSURE: 102 MMHG | DIASTOLIC BLOOD PRESSURE: 65 MMHG | TEMPERATURE: 98.2 F | BODY MASS INDEX: 24.51 KG/M2 | HEIGHT: 66 IN | WEIGHT: 152.5 LBS

## 2018-08-23 ENCOUNTER — DISCHARGE SUMMARY NURSING HOME (OUTPATIENT)
Dept: GERIATRICS | Facility: CLINIC | Age: 68
End: 2018-08-23
Payer: MEDICARE

## 2018-08-23 DIAGNOSIS — Z86.018 S/P RESECTION OF MENINGIOMA: ICD-10-CM

## 2018-08-23 DIAGNOSIS — R41.0 CONFUSION: ICD-10-CM

## 2018-08-23 DIAGNOSIS — G93.41 ACUTE METABOLIC ENCEPHALOPATHY: ICD-10-CM

## 2018-08-23 DIAGNOSIS — Z98.890 S/P RESECTION OF MENINGIOMA: ICD-10-CM

## 2018-08-23 DIAGNOSIS — E04.2 MULTINODULAR GOITER: ICD-10-CM

## 2018-08-23 DIAGNOSIS — E89.0 POSTOPERATIVE HYPOTHYROIDISM: ICD-10-CM

## 2018-08-23 DIAGNOSIS — Z86.79 H/O SUBARACHNOID HEMORRHAGE: ICD-10-CM

## 2018-08-23 DIAGNOSIS — S06.9X9D TRAUMATIC BRAIN INJURY WITH LOSS OF CONSCIOUSNESS, SUBSEQUENT ENCOUNTER: Primary | ICD-10-CM

## 2018-08-23 DIAGNOSIS — E89.0 S/P THYROIDECTOMY: ICD-10-CM

## 2018-08-23 DIAGNOSIS — F43.21 ADJUSTMENT DISORDER WITH DEPRESSED MOOD: ICD-10-CM

## 2018-08-23 DIAGNOSIS — G62.9 NEUROPATHY: ICD-10-CM

## 2018-08-23 DIAGNOSIS — R53.81 PHYSICAL DECONDITIONING: ICD-10-CM

## 2018-08-23 DIAGNOSIS — E78.49 OTHER HYPERLIPIDEMIA: ICD-10-CM

## 2018-08-23 PROCEDURE — 99316 NF DSCHRG MGMT 30 MIN+: CPT | Performed by: NURSE PRACTITIONER

## 2018-08-23 NOTE — PROGRESS NOTES
[place] [facility] DISCHARGE SUMMARY    PATIENT'S NAME: Carri Arias  YOB: 1950  MEDICAL RECORD NUMBER:  [id number]    PRIMARY CARE PROVIDER AND CLINIC RESPONSIBLE AFTER TRANSFER: Clinic, [facility] [address] / [place] [address]     CODE STATUS/ADVANCE DIRECTIVES DISCUSSION:   DNR / DNI       Allergies   Allergen Reactions     Penicillins Shortness Of Breath and Hives     Chest heaviness     Contrast Dye Hives       TRANSFERRING PROVIDERS: CHALINO Ruby CNP, Nikki Osorio MD  DATE OF SNF ADMISSION:  January / 24 / 2018  DATE OF SNF (anticipated) DISCHARGE: August / 27 / 2018  DISCHARGE DISPOSITION: [facility] Provider   Nursing Facility: [facility] stay 1/23/2018 to 1/24/2018.     Condition on Discharge:  Stable.  Function:  Resident independent with bed mobility, ambulation (w/RW) and transferring. Does have occasional incontinence of bladder.  Cognitive Scores: BIMS 06/15    Equipment: walker    DISCHARGE DIAGNOSIS:   1. Traumatic brain injury with loss of consciousness, subsequent encounter    2. H/O subarachnoid hemorrhage    3. Confusion    4. Acute metabolic encephalopathy    5. Neuropathy    6. Postoperative hypothyroidism    7. Multinodular goiter    8. S/P thyroidectomy    9. Other hyperlipidemia    10. Adjustment disorder with depressed mood    11. S/P resection of meningioma    12. Physical deconditioning        HPI Nursing Facility Course:  HPI information obtained from: facility chart records, facility staff, patient report and [facility] chart review.  Traumatic brain injury with loss of consciousness, subsequent encounter  H/O subarachnoid hemorrhage  Patient remains forgetful at baseline - responds appropriately to questions  Continues to reside in LTC facility with assistance with cares as needed - does complete most ADLs independently  BIMs: 06/15    Confusion  Acute  metabolic encephalopathy  Resolved  2/2 use of Gabapentin - resolved with discontinuation - has tolerated Lyrica without incident  Resolved    Neuropathy  Chronic  Resident continues to note severe neuropathy on feet  She is currently on regimen of Lyrica 25mg BID  Stable on current regimen; continue medications as currently ordered.  F/u with PCP for ongoing monitoring and management    Multinodular goiter  Postoperative hypothyroidism  S/P thyroidectomy  Prior to admission to facility  No issues with cold intolerance or increased fatigue since admission - has remained control on regimen of synthroid daily  TSH   Date Value Ref Range Status   06/22/2018 <0.02 (L) 0.30 - 4.50 uIU/mL Final   02/02/2018 1.37 0.30 - 4.50 uIU/mL Final   01/22/2018 17.43 (H) 0.40 - 4.00 mU/L Final   TSH recheck previously requested - do not see lab in patient's chart. She will need f/u re:this with PCP following discharge  Continue current dosing at this time    Other hyperlipidemia  Chronic  On regimen of Simvastatin  Recent Labs   Lab Test 06/22/18   CHOL  151   HDL  54   LDL  85   TRIG  62   Stable on current regimen; continue medications as currently ordered..  F/u with PCP for ongoing monitoring and management    Adjustment disorder with depressed mood  Chronic  Resident denies ongoing s/sx of depression  Managed on regimen of Fluoxetine  Last PHQ9: 00/27  No changes noted with GDR on 8/10 - continue to monitor.  Stable on current regimen; continue medications as currently ordered.  F/u with PCP/Psych for ongoing monitoring and management    S/P resection of meningioma  Hx of  Regimen of Keppra for seizure prophylaxis  Last Keppra level (6/22/18): 15.5  Stable on current regimen; continue medications as currently ordered.  F/u with PCP/Neurology for ongoing monitoring and management     Physical deconditioning  Recent fall - therapies restarted on 6/18 and completed to resident baseline  Will note discharge with ongoing  services  Follow-up with PCP for any future orders/needs as needed    PAST MEDICAL HISTORY:  has a past medical history of Arthritis; Hypercholesterolemia; Hypothyroidism; and Tremors. She also has no past medical history of Chronic infection; Malignant hyperthermia; or Sleep apnea.    DISCHARGE MEDICATIONS:  Current Outpatient Prescriptions   Medication Sig Dispense Refill     Calcium Carb-Cholecalciferol (CALCIUM-VITAMIN D) 600-400 MG-UNIT TABS Take 1 tablet by mouth 2 times daily       FLUoxetine 20 MG tablet Take 10 mg by mouth daily        levETIRAcetam (KEPPRA) 500 MG tablet Take 500 mg by mouth 2 times daily       levothyroxine (SYNTHROID/LEVOTHROID) 100 MCG tablet Take 100 mcg by mouth daily       pregabalin (LYRICA) 25 MG capsule Take 25 mg by mouth 3 times daily UPDATE NP W/ANY CHANGE IN MENTATION.        simvastatin (ZOCOR) 20 MG tablet Take 20 mg by mouth At Bedtime         MEDICATION CHANGES/RATIONALE:   2/1/18: Gabapentin increased to 800mg TID for neuropathic pain complaints  2/6/18: Gabapentin decreased to 600mg TID for somnolence with increased dose  3/29/18: Gabapentin discontinued 2/2 patient becoming increasingly confused, agitated and concerns for psychosis (Gabapentin added to allergy list). Started on regimen of Lyrica 25mg TID for neuropathic pain.   5/10/18: Lyrica decreased to BID dosing following medication recommendation  6/21/18: Lyrica re-increased to TID dosing 2/2 non-tolerance with GDR  7/8/18: Nystatin 100,000u/mL - 5mL before meals and at bedtime x7d - Thrush  8/10/18: Fluoxetine decreased to 10mg qDay - GDR - Depression  Controlled medications sent with patient:   Script for Lyrica 25mg capsules medication for 21 tabs and 0 refills given to patient at dischage to have them fill at their out patient pharmacy     ROS:    10 point ROS of systems including Constitutional, Eyes, Respiratory, Cardiovascular, Gastroenterology, Genitourinary, Integumentary, Muscularskeletal, Psychiatric  "were all negative except for pertinent positives noted in my HPI.    Physical Exam:   Vitals: /65  Pulse 77  Temp 98.2  F (36.8  C)  Resp 20  Ht 5' 6\" (1.676 m)  Wt 152 lb 8 oz (69.2 kg)  SpO2 90%  BMI 24.61 kg/m2  BMI= Body mass index is 24.61 kg/(m^2).  GENERAL APPEARANCE:  Alert, in no distress, appears healthy, oriented, cooperative, slightly forgetful and sarcastic, middle-aged ambulating through the hallways with use of 4WW  ENT:  Mouth and posterior oropharynx normal, moist mucous membranes, normal hearing acuity  EYES:  EOM, conjunctivae, lids, pupils and irises normal, wears glasses  NECK:  No adenopathy, masses or thyromegaly  RESP:  Respiratory effort and palpation of chest normal, lungs clear to auscultation, no respiratory distress  CV:  Palpation and auscultation of heart done, regular rate and rhythm, no murmur, rub, or gallop, no edema, +2 pedal pulses  ABDOMEN:  normal bowel sounds, soft, nontender, no hepatosplenomegaly or other masses  M/S:   Gait and station normal with use of RW; Digits and nails normal  SKIN:  Inspection of skin and subcutaneous tissue baseline, Palpation of skin and subcutaneous tissue baseline  NEURO:   Cranial nerves 2-12 are normal tested and grossly at patient's baseline  PSYCH:  oriented X 3, normal insight, judgement and memory, affect and mood normal, slightly forgetful      BP Reading:            HR:  49-77  109/57   91/45  108/46    DISCHARGE PLAN:  No services  Patient instructed to follow-up with:  PCP in 7-10 days       [facility] scheduled appointments: None    MTM referral needed and placed by this provider: No    Pending labs: None  SNF labs   CBC RESULTS:   Recent Labs   Lab Test 06/22/18 02/02/18 01/22/18   7697   04/12/14   0630   WBC  5.7  6.1  9.9   --   9.0   RBC  4.72  4.38  5.42*   --   4.63   HGB  13.2  12.6  15.7   < >  14.0   HCT  42.1  41.5  48.9*   --   42.7   MCV  89.2  94.7  90   --   92   MCH   --    --   29.0   --   30.2 "   MCHC   --    --   32.1   --   32.8   RDW  13.8  14.5  13.4   --   14.8   PLT  189  201  281   < >  432    < > = values in this interval not displayed.       Last Basic Metabolic Panel:  Recent Labs   Lab Test 06/22/18 02/02/18   NA  144  143   POTASSIUM  4.3  4.5   CHLORIDE  109  109   VICKY  8.9  8.5   CO2  28  28   BUN  20  17   CR  0.61  0.87   GLC  75  83       Liver Function Studies -   Recent Labs   Lab Test 06/22/18 01/22/18   2257   PROTTOTAL  5.8*  7.3   ALBUMIN  2.8*  3.3*   BILITOTAL  0.4  0.5   ALKPHOS  108  95   AST  31  18   ALT  37  16       TSH   Date Value Ref Range Status   06/22/2018 <0.02 (L) 0.30 - 4.50 uIU/mL Final   02/02/2018 1.37 0.30 - 4.50 uIU/mL Final         Discharge Treatments: None    TOTAL DISCHARGE TIME:   Greater than 30 minutes  Electronically signed by:  CHALINO Ruby CNP

## 2018-08-23 NOTE — LETTER
8/23/2018        RE: Carri Arias  [address]  [facility] [address]          [place] [facility] DISCHARGE SUMMARY    PATIENT'S NAME: Carri Arias  YOB: 1950  MEDICAL RECORD NUMBER:  [id number]    PRIMARY CARE PROVIDER AND CLINIC RESPONSIBLE AFTER TRANSFER: Clinic, [facility] [address] / [facility] [address]     CODE STATUS/ADVANCE DIRECTIVES DISCUSSION:   DNR / DNI       Allergies   Allergen Reactions     Penicillins Shortness Of Breath and Hives     Chest heaviness     Contrast Dye Hives       TRANSFERRING PROVIDERS: CHALINO Ruby CNP, Nikki Osorio MD  DATE OF SNF ADMISSION:  January / 24 / 2018  DATE OF SNF (anticipated) DISCHARGE: August / 27 / 2018  DISCHARGE DISPOSITION: [facility] Provider   Nursing Facility: [facility] stay 1/23/2018 to 1/24/2018.     Condition on Discharge:  Stable.  Function:  Resident independent with bed mobility, ambulation (w/RW) and transferring. Does have occasional incontinence of bladder.  Cognitive Scores: BIMS 06/15    Equipment: walker    DISCHARGE DIAGNOSIS:   1. Traumatic brain injury with loss of consciousness, subsequent encounter    2. H/O subarachnoid hemorrhage    3. Confusion    4. Acute metabolic encephalopathy    5. Neuropathy    6. Postoperative hypothyroidism    7. Multinodular goiter    8. S/P thyroidectomy    9. Other hyperlipidemia    10. Adjustment disorder with depressed mood    11. S/P resection of meningioma    12. Physical deconditioning        HPI Nursing Facility Course:  HPI information obtained from: facility chart records, facility staff, patient report and [facility] chart review.  Traumatic brain injury with loss of consciousness, subsequent encounter  H/O subarachnoid hemorrhage  Patient remains forgetful at baseline - responds appropriately to questions  Continues to reside in LTC facility with  assistance with cares as needed - does complete most ADLs independently  BIMs: 06/15    Confusion  Acute metabolic encephalopathy  Resolved  2/2 use of Gabapentin - resolved with discontinuation - has tolerated Lyrica without incident  Resolved    Neuropathy  Chronic  Resident continues to note severe neuropathy on feet  She is currently on regimen of Lyrica 25mg BID  Stable on current regimen; continue medications as currently ordered.  F/u with PCP for ongoing monitoring and management    Multinodular goiter  Postoperative hypothyroidism  S/P thyroidectomy  Prior to admission to facility  No issues with cold intolerance or increased fatigue since admission - has remained control on regimen of synthroid daily  TSH   Date Value Ref Range Status   06/22/2018 <0.02 (L) 0.30 - 4.50 uIU/mL Final   02/02/2018 1.37 0.30 - 4.50 uIU/mL Final   01/22/2018 17.43 (H) 0.40 - 4.00 mU/L Final   TSH recheck previously requested - do not see lab in patient's chart. She will need f/u re:this with PCP following discharge  Continue current dosing at this time    Other hyperlipidemia  Chronic  On regimen of Simvastatin  Recent Labs   Lab Test 06/22/18   CHOL  151   HDL  54   LDL  85   TRIG  62   Stable on current regimen; continue medications as currently ordered..  F/u with PCP for ongoing monitoring and management    Adjustment disorder with depressed mood  Chronic  Resident denies ongoing s/sx of depression  Managed on regimen of Fluoxetine  Last PHQ9: 00/27  No changes noted with GDR on 8/10 - continue to monitor.  Stable on current regimen; continue medications as currently ordered.  F/u with PCP/Psych for ongoing monitoring and management    S/P resection of meningioma  Hx of  Regimen of Keppra for seizure prophylaxis  Last Keppra level (6/22/18): 15.5  Stable on current regimen; continue medications as currently ordered.  F/u with PCP/Neurology for ongoing monitoring and management     Physical deconditioning  Recent fall -  therapies restarted on 6/18 and completed to resident baseline  Will note discharge with ongoing services  Follow-up with PCP for any future orders/needs as needed    PAST MEDICAL HISTORY:  has a past medical history of Arthritis; Hypercholesterolemia; Hypothyroidism; and Tremors. She also has no past medical history of Chronic infection; Malignant hyperthermia; or Sleep apnea.    DISCHARGE MEDICATIONS:  Current Outpatient Prescriptions   Medication Sig Dispense Refill     Calcium Carb-Cholecalciferol (CALCIUM-VITAMIN D) 600-400 MG-UNIT TABS Take 1 tablet by mouth 2 times daily       FLUoxetine 20 MG tablet Take 10 mg by mouth daily        levETIRAcetam (KEPPRA) 500 MG tablet Take 500 mg by mouth 2 times daily       levothyroxine (SYNTHROID/LEVOTHROID) 100 MCG tablet Take 100 mcg by mouth daily       pregabalin (LYRICA) 25 MG capsule Take 25 mg by mouth 3 times daily UPDATE NP W/ANY CHANGE IN MENTATION.        simvastatin (ZOCOR) 20 MG tablet Take 20 mg by mouth At Bedtime         MEDICATION CHANGES/RATIONALE:   2/1/18: Gabapentin increased to 800mg TID for neuropathic pain complaints  2/6/18: Gabapentin decreased to 600mg TID for somnolence with increased dose  3/29/18: Gabapentin discontinued 2/2 patient becoming increasingly confused, agitated and concerns for psychosis (Gabapentin added to allergy list). Started on regimen of Lyrica 25mg TID for neuropathic pain.   5/10/18: Lyrica decreased to BID dosing following medication recommendation  6/21/18: Lyrica re-increased to TID dosing 2/2 non-tolerance with GDR  7/8/18: Nystatin 100,000u/mL - 5mL before meals and at bedtime x7d - Thrush  8/10/18: Fluoxetine decreased to 10mg qDay - GDR - Depression  Controlled medications sent with patient:   Script for Lyrica 25mg capsules medication for 21 tabs and 0 refills given to patient at dischage to have them fill at their out patient pharmacy     ROS:    10 point ROS of systems including Constitutional, Eyes,  "Respiratory, Cardiovascular, Gastroenterology, Genitourinary, Integumentary, Muscularskeletal, Psychiatric were all negative except for pertinent positives noted in my HPI.    Physical Exam:   Vitals: /65  Pulse 77  Temp 98.2  F (36.8  C)  Resp 20  Ht 5' 6\" (1.676 m)  Wt 152 lb 8 oz (69.2 kg)  SpO2 90%  BMI 24.61 kg/m2  BMI= Body mass index is 24.61 kg/(m^2).  GENERAL APPEARANCE:  Alert, in no distress, appears healthy, oriented, cooperative, slightly forgetful and sarcastic, middle-aged ambulating through the hallways with use of 4WW  ENT:  Mouth and posterior oropharynx normal, moist mucous membranes, normal hearing acuity  EYES:  EOM, conjunctivae, lids, pupils and irises normal, wears glasses  NECK:  No adenopathy, masses or thyromegaly  RESP:  Respiratory effort and palpation of chest normal, lungs clear to auscultation, no respiratory distress  CV:  Palpation and auscultation of heart done, regular rate and rhythm, no murmur, rub, or gallop, no edema, +2 pedal pulses  ABDOMEN:  normal bowel sounds, soft, nontender, no hepatosplenomegaly or other masses  M/S:   Gait and station normal with use of RW; Digits and nails normal  SKIN:  Inspection of skin and subcutaneous tissue baseline, Palpation of skin and subcutaneous tissue baseline  NEURO:   Cranial nerves 2-12 are normal tested and grossly at patient's baseline  PSYCH:  oriented X 3, normal insight, judgement and memory, affect and mood normal, slightly forgetful      BP Reading:            HR:  49-77  109/57   91/45  108/46    DISCHARGE PLAN:  No services  Patient instructed to follow-up with:  PCP in 7-10 days       [facility] scheduled appointments: None    MTM referral needed and placed by this provider: No    Pending labs: None  SNF labs   CBC RESULTS:   Recent Labs   Lab Test 06/22/18 02/02/18 01/22/18   2257   04/12/14   0630   WBC  5.7  6.1  9.9   --   9.0   RBC  4.72  4.38  5.42*   --   4.63   HGB  13.2  12.6  15.7   < >  14.0 "   HCT  42.1  41.5  48.9*   --   42.7   MCV  89.2  94.7  90   --   92   MCH   --    --   29.0   --   30.2   MCHC   --    --   32.1   --   32.8   RDW  13.8  14.5  13.4   --   14.8   PLT  189  201  281   < >  432    < > = values in this interval not displayed.       Last Basic Metabolic Panel:  Recent Labs   Lab Test 06/22/18 02/02/18   NA  144  143   POTASSIUM  4.3  4.5   CHLORIDE  109  109   VICKY  8.9  8.5   CO2  28  28   BUN  20  17   CR  0.61  0.87   GLC  75  83       Liver Function Studies -   Recent Labs   Lab Test 06/22/18 01/22/18   2257   PROTTOTAL  5.8*  7.3   ALBUMIN  2.8*  3.3*   BILITOTAL  0.4  0.5   ALKPHOS  108  95   AST  31  18   ALT  37  16       TSH   Date Value Ref Range Status   06/22/2018 <0.02 (L) 0.30 - 4.50 uIU/mL Final   02/02/2018 1.37 0.30 - 4.50 uIU/mL Final         Discharge Treatments: None    TOTAL DISCHARGE TIME:   Greater than 30 minutes  Electronically signed by:  CHALINO Ruby CNP      Sincerely,        CHALINO Ruby CNP

## 2019-01-10 ENCOUNTER — RECORDS - HEALTHEAST (OUTPATIENT)
Dept: LAB | Facility: CLINIC | Age: 69
End: 2019-01-10

## 2019-01-10 LAB
TSH SERPL DL<=0.005 MIU/L-ACNC: <0.01 UIU/ML (ref 0.3–5)
VIT B12 SERPL-MCNC: 706 PG/ML (ref 213–816)

## 2019-03-07 ENCOUNTER — RECORDS - HEALTHEAST (OUTPATIENT)
Dept: LAB | Facility: CLINIC | Age: 69
End: 2019-03-07

## 2019-03-07 LAB — TSH SERPL DL<=0.005 MIU/L-ACNC: <0.01 UIU/ML (ref 0.3–5)

## 2019-05-22 ENCOUNTER — RECORDS - HEALTHEAST (OUTPATIENT)
Dept: LAB | Facility: CLINIC | Age: 69
End: 2019-05-22

## 2019-05-23 LAB — TSH SERPL DL<=0.005 MIU/L-ACNC: 6.54 UIU/ML (ref 0.3–5)
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
## 2019-08-14 ENCOUNTER — RECORDS - HEALTHEAST (OUTPATIENT)
Dept: LAB | Facility: CLINIC | Age: 69
End: 2019-08-14

## 2019-08-14 LAB — TSH SERPL DL<=0.005 MIU/L-ACNC: 0.98 UIU/ML (ref 0.3–5)

## 2020-07-22 ENCOUNTER — RECORDS - HEALTHEAST (OUTPATIENT)
Dept: LAB | Facility: CLINIC | Age: 70
End: 2020-07-22

## 2020-07-23 LAB
ANION GAP SERPL CALCULATED.3IONS-SCNC: 9 MMOL/L (ref 5–18)
BUN SERPL-MCNC: 13 MG/DL (ref 8–22)
CALCIUM SERPL-MCNC: 9.3 MG/DL (ref 8.5–10.5)
CHLORIDE BLD-SCNC: 102 MMOL/L (ref 98–107)
CO2 SERPL-SCNC: 32 MMOL/L (ref 22–31)
CREAT SERPL-MCNC: 0.77 MG/DL (ref 0.6–1.1)
ERYTHROCYTE [DISTWIDTH] IN BLOOD BY AUTOMATED COUNT: 14.7 % (ref 11–14.5)
GFR SERPL CREATININE-BSD FRML MDRD: >60 ML/MIN/1.73M2
GLUCOSE BLD-MCNC: 56 MG/DL (ref 70–125)
HCT VFR BLD AUTO: 48.8 % (ref 35–47)
HGB BLD-MCNC: 14.8 G/DL (ref 12–16)
MCH RBC QN AUTO: 28.1 PG (ref 27–34)
MCHC RBC AUTO-ENTMCNC: 30.3 G/DL (ref 32–36)
MCV RBC AUTO: 93 FL (ref 80–100)
PLATELET # BLD AUTO: 317 THOU/UL (ref 140–440)
PMV BLD AUTO: 10.7 FL (ref 8.5–12.5)
POTASSIUM BLD-SCNC: 3.8 MMOL/L (ref 3.5–5)
RBC # BLD AUTO: 5.27 MILL/UL (ref 3.8–5.4)
SODIUM SERPL-SCNC: 143 MMOL/L (ref 136–145)
TSH SERPL DL<=0.005 MIU/L-ACNC: 14.19 UIU/ML (ref 0.3–5)
VIT B12 SERPL-MCNC: 1036 PG/ML (ref 213–816)
WBC: 6.9 THOU/UL (ref 4–11)

## 2020-08-31 ENCOUNTER — RECORDS - HEALTHEAST (OUTPATIENT)
Dept: LAB | Facility: CLINIC | Age: 70
End: 2020-08-31

## 2020-09-01 LAB — TSH SERPL DL<=0.005 MIU/L-ACNC: 0.3 UIU/ML (ref 0.3–5)

## 2021-03-01 ENCOUNTER — RECORDS - HEALTHEAST (OUTPATIENT)
Dept: LAB | Facility: CLINIC | Age: 71
End: 2021-03-01

## 2021-03-02 LAB
ALBUMIN SERPL-MCNC: 2.7 G/DL (ref 3.5–5)
ALP SERPL-CCNC: 121 U/L (ref 45–120)
ALT SERPL W P-5'-P-CCNC: 26 U/L (ref 0–45)
ANION GAP SERPL CALCULATED.3IONS-SCNC: 7 MMOL/L (ref 5–18)
AST SERPL W P-5'-P-CCNC: 28 U/L (ref 0–40)
BILIRUB SERPL-MCNC: 0.3 MG/DL (ref 0–1)
BUN SERPL-MCNC: 15 MG/DL (ref 8–22)
CALCIUM SERPL-MCNC: 8.2 MG/DL (ref 8.5–10.5)
CHLORIDE BLD-SCNC: 109 MMOL/L (ref 98–107)
CO2 SERPL-SCNC: 27 MMOL/L (ref 22–31)
CREAT SERPL-MCNC: 0.72 MG/DL (ref 0.6–1.1)
ERYTHROCYTE [DISTWIDTH] IN BLOOD BY AUTOMATED COUNT: 13.7 % (ref 11–14.5)
GFR SERPL CREATININE-BSD FRML MDRD: >60 ML/MIN/1.73M2
GLUCOSE BLD-MCNC: 79 MG/DL (ref 70–125)
HCT VFR BLD AUTO: 43.1 % (ref 35–47)
HGB BLD-MCNC: 13.6 G/DL (ref 12–16)
MCH RBC QN AUTO: 28.5 PG (ref 27–34)
MCHC RBC AUTO-ENTMCNC: 31.6 G/DL (ref 32–36)
MCV RBC AUTO: 90 FL (ref 80–100)
PLATELET # BLD AUTO: 207 THOU/UL (ref 140–440)
PMV BLD AUTO: 11.3 FL (ref 8.5–12.5)
POTASSIUM BLD-SCNC: 4.3 MMOL/L (ref 3.5–5)
PROT SERPL-MCNC: 6.3 G/DL (ref 6–8)
RBC # BLD AUTO: 4.77 MILL/UL (ref 3.8–5.4)
SODIUM SERPL-SCNC: 143 MMOL/L (ref 136–145)
TSH SERPL DL<=0.005 MIU/L-ACNC: 1.05 UIU/ML (ref 0.3–5)
VIT B12 SERPL-MCNC: 890 PG/ML (ref 213–816)
WBC: 6.8 THOU/UL (ref 4–11)

## 2021-10-13 ENCOUNTER — LAB REQUISITION (OUTPATIENT)
Dept: LAB | Facility: CLINIC | Age: 71
End: 2021-10-13
Payer: COMMERCIAL

## 2021-10-13 DIAGNOSIS — G30.9 ALZHEIMER'S DISEASE, UNSPECIFIED (CODE) (H): ICD-10-CM

## 2021-10-14 LAB
ANION GAP SERPL CALCULATED.3IONS-SCNC: 6 MMOL/L (ref 5–18)
BUN SERPL-MCNC: 14 MG/DL (ref 8–22)
CALCIUM SERPL-MCNC: 9 MG/DL (ref 8.5–10.5)
CHLORIDE BLD-SCNC: 107 MMOL/L (ref 98–107)
CO2 SERPL-SCNC: 30 MMOL/L (ref 22–31)
CREAT SERPL-MCNC: 0.75 MG/DL (ref 0.6–1.1)
ERYTHROCYTE [DISTWIDTH] IN BLOOD BY AUTOMATED COUNT: 13.4 % (ref 10–15)
GFR SERPL CREATININE-BSD FRML MDRD: 82 ML/MIN/1.73M2
GLUCOSE BLD-MCNC: 82 MG/DL (ref 70–125)
HCT VFR BLD AUTO: 45.1 % (ref 35–47)
HGB BLD-MCNC: 14.3 G/DL (ref 11.7–15.7)
MCH RBC QN AUTO: 29.2 PG (ref 26.5–33)
MCHC RBC AUTO-ENTMCNC: 31.7 G/DL (ref 31.5–36.5)
MCV RBC AUTO: 92 FL (ref 78–100)
PLATELET # BLD AUTO: 209 10E3/UL (ref 150–450)
POTASSIUM BLD-SCNC: 4.2 MMOL/L (ref 3.5–5)
RBC # BLD AUTO: 4.89 10E6/UL (ref 3.8–5.2)
SODIUM SERPL-SCNC: 143 MMOL/L (ref 136–145)
TSH SERPL DL<=0.005 MIU/L-ACNC: 0.27 UIU/ML (ref 0.3–5)
VIT B12 SERPL-MCNC: 826 PG/ML (ref 213–816)
WBC # BLD AUTO: 5.3 10E3/UL (ref 4–11)

## 2021-10-14 PROCEDURE — 36415 COLL VENOUS BLD VENIPUNCTURE: CPT | Mod: ORL | Performed by: INTERNAL MEDICINE

## 2021-10-14 PROCEDURE — 84443 ASSAY THYROID STIM HORMONE: CPT | Mod: ORL | Performed by: INTERNAL MEDICINE

## 2021-10-14 PROCEDURE — P9604 ONE-WAY ALLOW PRORATED TRIP: HCPCS | Mod: ORL | Performed by: INTERNAL MEDICINE

## 2021-10-14 PROCEDURE — 80048 BASIC METABOLIC PNL TOTAL CA: CPT | Mod: ORL | Performed by: INTERNAL MEDICINE

## 2021-10-14 PROCEDURE — 82607 VITAMIN B-12: CPT | Mod: ORL | Performed by: INTERNAL MEDICINE

## 2021-10-14 PROCEDURE — 85027 COMPLETE CBC AUTOMATED: CPT | Mod: ORL | Performed by: INTERNAL MEDICINE

## 2021-11-30 ENCOUNTER — LAB REQUISITION (OUTPATIENT)
Dept: LAB | Facility: CLINIC | Age: 71
End: 2021-11-30
Payer: COMMERCIAL

## 2021-11-30 DIAGNOSIS — E03.9 HYPOTHYROIDISM, UNSPECIFIED: ICD-10-CM

## 2021-12-02 LAB — TSH SERPL DL<=0.005 MIU/L-ACNC: 1.25 UIU/ML (ref 0.3–5)

## 2021-12-02 PROCEDURE — 84443 ASSAY THYROID STIM HORMONE: CPT | Mod: ORL | Performed by: INTERNAL MEDICINE

## 2021-12-02 PROCEDURE — P9604 ONE-WAY ALLOW PRORATED TRIP: HCPCS | Mod: ORL | Performed by: INTERNAL MEDICINE

## 2021-12-02 PROCEDURE — 36415 COLL VENOUS BLD VENIPUNCTURE: CPT | Mod: ORL | Performed by: INTERNAL MEDICINE

## 2022-04-06 ENCOUNTER — LAB REQUISITION (OUTPATIENT)
Dept: LAB | Facility: CLINIC | Age: 72
End: 2022-04-06
Payer: COMMERCIAL

## 2022-04-06 DIAGNOSIS — F03.90 UNSPECIFIED DEMENTIA WITHOUT BEHAVIORAL DISTURBANCE: ICD-10-CM

## 2022-04-07 LAB
ALBUMIN SERPL-MCNC: 2.9 G/DL (ref 3.5–5)
ALP SERPL-CCNC: 120 U/L (ref 45–120)
ALT SERPL W P-5'-P-CCNC: 15 U/L (ref 0–45)
ANION GAP SERPL CALCULATED.3IONS-SCNC: 9 MMOL/L (ref 5–18)
AST SERPL W P-5'-P-CCNC: 22 U/L (ref 0–40)
BASOPHILS # BLD AUTO: 0 10E3/UL (ref 0–0.2)
BASOPHILS NFR BLD AUTO: 0 %
BILIRUB SERPL-MCNC: 0.3 MG/DL (ref 0–1)
BUN SERPL-MCNC: 21 MG/DL (ref 8–22)
CALCIUM SERPL-MCNC: 9 MG/DL (ref 8.5–10.5)
CHLORIDE BLD-SCNC: 107 MMOL/L (ref 98–107)
CO2 SERPL-SCNC: 29 MMOL/L (ref 22–31)
CREAT SERPL-MCNC: 0.7 MG/DL (ref 0.6–1.1)
EOSINOPHIL # BLD AUTO: 0.1 10E3/UL (ref 0–0.7)
EOSINOPHIL NFR BLD AUTO: 1 %
ERYTHROCYTE [DISTWIDTH] IN BLOOD BY AUTOMATED COUNT: 13.7 % (ref 10–15)
GFR SERPL CREATININE-BSD FRML MDRD: >90 ML/MIN/1.73M2
GLUCOSE BLD-MCNC: 72 MG/DL (ref 70–125)
HCT VFR BLD AUTO: 46.4 % (ref 35–47)
HGB BLD-MCNC: 14.6 G/DL (ref 11.7–15.7)
IMM GRANULOCYTES # BLD: 0 10E3/UL
IMM GRANULOCYTES NFR BLD: 0 %
LYMPHOCYTES # BLD AUTO: 2 10E3/UL (ref 0.8–5.3)
LYMPHOCYTES NFR BLD AUTO: 32 %
MCH RBC QN AUTO: 29.3 PG (ref 26.5–33)
MCHC RBC AUTO-ENTMCNC: 31.5 G/DL (ref 31.5–36.5)
MCV RBC AUTO: 93 FL (ref 78–100)
MONOCYTES # BLD AUTO: 0.5 10E3/UL (ref 0–1.3)
MONOCYTES NFR BLD AUTO: 8 %
NEUTROPHILS # BLD AUTO: 3.6 10E3/UL (ref 1.6–8.3)
NEUTROPHILS NFR BLD AUTO: 59 %
NRBC # BLD AUTO: 0 10E3/UL
NRBC BLD AUTO-RTO: 0 /100
PLATELET # BLD AUTO: 225 10E3/UL (ref 150–450)
POTASSIUM BLD-SCNC: 4.1 MMOL/L (ref 3.5–5)
PROT SERPL-MCNC: 6.5 G/DL (ref 6–8)
RBC # BLD AUTO: 4.98 10E6/UL (ref 3.8–5.2)
SODIUM SERPL-SCNC: 145 MMOL/L (ref 136–145)
TSH SERPL DL<=0.005 MIU/L-ACNC: 0.2 UIU/ML (ref 0.3–5)
VIT B12 SERPL-MCNC: 830 PG/ML (ref 213–816)
WBC # BLD AUTO: 6.2 10E3/UL (ref 4–11)

## 2022-04-07 PROCEDURE — 80053 COMPREHEN METABOLIC PANEL: CPT | Mod: ORL | Performed by: INTERNAL MEDICINE

## 2022-04-07 PROCEDURE — 85025 COMPLETE CBC W/AUTO DIFF WBC: CPT | Mod: ORL | Performed by: INTERNAL MEDICINE

## 2022-04-07 PROCEDURE — 36415 COLL VENOUS BLD VENIPUNCTURE: CPT | Mod: ORL | Performed by: INTERNAL MEDICINE

## 2022-04-07 PROCEDURE — P9603 ONE-WAY ALLOW PRORATED MILES: HCPCS | Mod: ORL | Performed by: INTERNAL MEDICINE

## 2022-04-07 PROCEDURE — 84443 ASSAY THYROID STIM HORMONE: CPT | Mod: ORL | Performed by: INTERNAL MEDICINE

## 2022-04-07 PROCEDURE — 82607 VITAMIN B-12: CPT | Mod: ORL | Performed by: INTERNAL MEDICINE

## 2022-05-15 ENCOUNTER — LAB REQUISITION (OUTPATIENT)
Dept: LAB | Facility: CLINIC | Age: 72
End: 2022-05-15
Payer: COMMERCIAL

## 2022-05-15 DIAGNOSIS — E03.9 HYPOTHYROIDISM, UNSPECIFIED: ICD-10-CM

## 2022-10-24 ENCOUNTER — LAB REQUISITION (OUTPATIENT)
Dept: LAB | Facility: CLINIC | Age: 72
End: 2022-10-24
Payer: COMMERCIAL

## 2022-10-24 DIAGNOSIS — I10 ESSENTIAL (PRIMARY) HYPERTENSION: ICD-10-CM

## 2022-10-25 LAB
ANION GAP SERPL CALCULATED.3IONS-SCNC: 12 MMOL/L (ref 7–15)
BASOPHILS # BLD AUTO: 0 10E3/UL (ref 0–0.2)
BASOPHILS NFR BLD AUTO: 0 %
BUN SERPL-MCNC: 15.2 MG/DL (ref 8–23)
CALCIUM SERPL-MCNC: 8.7 MG/DL (ref 8.8–10.2)
CHLORIDE SERPL-SCNC: 107 MMOL/L (ref 98–107)
CREAT SERPL-MCNC: 0.76 MG/DL (ref 0.51–0.95)
DEPRECATED HCO3 PLAS-SCNC: 24 MMOL/L (ref 22–29)
EOSINOPHIL # BLD AUTO: 0.1 10E3/UL (ref 0–0.7)
EOSINOPHIL NFR BLD AUTO: 1 %
ERYTHROCYTE [DISTWIDTH] IN BLOOD BY AUTOMATED COUNT: 14.1 % (ref 10–15)
GFR SERPL CREATININE-BSD FRML MDRD: 84 ML/MIN/1.73M2
GLUCOSE SERPL-MCNC: 90 MG/DL (ref 70–99)
HCT VFR BLD AUTO: 46 % (ref 35–47)
HGB BLD-MCNC: 13.9 G/DL (ref 11.7–15.7)
IMM GRANULOCYTES # BLD: 0 10E3/UL
IMM GRANULOCYTES NFR BLD: 0 %
LYMPHOCYTES # BLD AUTO: 1.9 10E3/UL (ref 0.8–5.3)
LYMPHOCYTES NFR BLD AUTO: 21 %
MCH RBC QN AUTO: 27.9 PG (ref 26.5–33)
MCHC RBC AUTO-ENTMCNC: 30.2 G/DL (ref 31.5–36.5)
MCV RBC AUTO: 92 FL (ref 78–100)
MONOCYTES # BLD AUTO: 0.7 10E3/UL (ref 0–1.3)
MONOCYTES NFR BLD AUTO: 8 %
NEUTROPHILS # BLD AUTO: 6 10E3/UL (ref 1.6–8.3)
NEUTROPHILS NFR BLD AUTO: 70 %
NRBC # BLD AUTO: 0 10E3/UL
NRBC BLD AUTO-RTO: 0 /100
PLATELET # BLD AUTO: 248 10E3/UL (ref 150–450)
POTASSIUM SERPL-SCNC: 3.9 MMOL/L (ref 3.4–5.3)
RBC # BLD AUTO: 4.98 10E6/UL (ref 3.8–5.2)
SODIUM SERPL-SCNC: 143 MMOL/L (ref 136–145)
TSH SERPL DL<=0.005 MIU/L-ACNC: 2.33 UIU/ML (ref 0.3–4.2)
WBC # BLD AUTO: 8.7 10E3/UL (ref 4–11)

## 2022-10-25 PROCEDURE — 85025 COMPLETE CBC W/AUTO DIFF WBC: CPT | Mod: ORL | Performed by: INTERNAL MEDICINE

## 2022-10-25 PROCEDURE — 36415 COLL VENOUS BLD VENIPUNCTURE: CPT | Mod: ORL | Performed by: INTERNAL MEDICINE

## 2022-10-25 PROCEDURE — 84443 ASSAY THYROID STIM HORMONE: CPT | Mod: ORL | Performed by: INTERNAL MEDICINE

## 2022-10-25 PROCEDURE — P9603 ONE-WAY ALLOW PRORATED MILES: HCPCS | Mod: ORL | Performed by: INTERNAL MEDICINE

## 2022-10-25 PROCEDURE — 80048 BASIC METABOLIC PNL TOTAL CA: CPT | Mod: ORL | Performed by: INTERNAL MEDICINE

## 2024-06-17 PROBLEM — Z71.89 ADVANCED DIRECTIVES, COUNSELING/DISCUSSION: Status: RESOLVED | Noted: 2018-06-26 | Resolved: 2024-06-17

## 2024-11-21 ENCOUNTER — HOSPITAL ENCOUNTER (EMERGENCY)
Facility: CLINIC | Age: 74
Discharge: HOME | End: 2024-11-21
Attending: FAMILY MEDICINE
Payer: MEDICARE

## 2024-11-21 VITALS
HEART RATE: 70 BPM | SYSTOLIC BLOOD PRESSURE: 114 MMHG | RESPIRATION RATE: 16 BRPM | BODY MASS INDEX: 29.86 KG/M2 | WEIGHT: 185 LBS | DIASTOLIC BLOOD PRESSURE: 84 MMHG | OXYGEN SATURATION: 97 % | TEMPERATURE: 98.6 F

## 2024-11-21 DIAGNOSIS — K64.4 EXTERNAL HEMORRHOIDS: ICD-10-CM

## 2024-11-21 DIAGNOSIS — K62.5 BRIGHT RED BLOOD PER RECTUM: ICD-10-CM

## 2024-11-21 LAB
ALBUMIN SERPL BCG-MCNC: 3.4 G/DL (ref 3.5–5.2)
ALP SERPL-CCNC: 168 U/L (ref 40–150)
ALT SERPL W P-5'-P-CCNC: 16 U/L (ref 0–50)
ANION GAP SERPL CALCULATED.3IONS-SCNC: 10 MMOL/L (ref 7–15)
AST SERPL W P-5'-P-CCNC: 17 U/L (ref 0–45)
BASOPHILS # BLD AUTO: 0 10E3/UL (ref 0–0.2)
BASOPHILS NFR BLD AUTO: 0 %
BILIRUB SERPL-MCNC: 0.3 MG/DL
BUN SERPL-MCNC: 26.2 MG/DL (ref 8–23)
CALCIUM SERPL-MCNC: 8.7 MG/DL (ref 8.8–10.4)
CHLORIDE SERPL-SCNC: 103 MMOL/L (ref 98–107)
CREAT SERPL-MCNC: 1.02 MG/DL (ref 0.51–0.95)
EGFRCR SERPLBLD CKD-EPI 2021: 58 ML/MIN/1.73M2
EOSINOPHIL # BLD AUTO: 0 10E3/UL (ref 0–0.7)
EOSINOPHIL NFR BLD AUTO: 0 %
ERYTHROCYTE [DISTWIDTH] IN BLOOD BY AUTOMATED COUNT: 13.7 % (ref 10–15)
GLUCOSE SERPL-MCNC: 104 MG/DL (ref 70–99)
HCO3 SERPL-SCNC: 27 MMOL/L (ref 22–29)
HCT VFR BLD AUTO: 42.7 % (ref 35–47)
HGB BLD-MCNC: 13.7 G/DL (ref 11.7–15.7)
IMM GRANULOCYTES # BLD: 0 10E3/UL
IMM GRANULOCYTES NFR BLD: 0 %
INR PPP: 1.07 (ref 0.85–1.15)
LYMPHOCYTES # BLD AUTO: 2.3 10E3/UL (ref 0.8–5.3)
LYMPHOCYTES NFR BLD AUTO: 24 %
MCH RBC QN AUTO: 26.9 PG (ref 26.5–33)
MCHC RBC AUTO-ENTMCNC: 32.1 G/DL (ref 31.5–36.5)
MCV RBC AUTO: 84 FL (ref 78–100)
MONOCYTES # BLD AUTO: 1.3 10E3/UL (ref 0–1.3)
MONOCYTES NFR BLD AUTO: 13 %
NEUTROPHILS # BLD AUTO: 6 10E3/UL (ref 1.6–8.3)
NEUTROPHILS NFR BLD AUTO: 62 %
NRBC # BLD AUTO: 0 10E3/UL
NRBC BLD AUTO-RTO: 0 /100
PLATELET # BLD AUTO: 225 10E3/UL (ref 150–450)
POTASSIUM SERPL-SCNC: 3.8 MMOL/L (ref 3.4–5.3)
PROT SERPL-MCNC: 6.9 G/DL (ref 6.4–8.3)
RBC # BLD AUTO: 5.09 10E6/UL (ref 3.8–5.2)
SODIUM SERPL-SCNC: 140 MMOL/L (ref 135–145)
WBC # BLD AUTO: 9.6 10E3/UL (ref 4–11)

## 2024-11-21 PROCEDURE — 84450 TRANSFERASE (AST) (SGOT): CPT | Performed by: FAMILY MEDICINE

## 2024-11-21 PROCEDURE — 85004 AUTOMATED DIFF WBC COUNT: CPT | Performed by: FAMILY MEDICINE

## 2024-11-21 PROCEDURE — 84155 ASSAY OF PROTEIN SERUM: CPT | Performed by: FAMILY MEDICINE

## 2024-11-21 PROCEDURE — 82310 ASSAY OF CALCIUM: CPT | Performed by: FAMILY MEDICINE

## 2024-11-21 PROCEDURE — 82374 ASSAY BLOOD CARBON DIOXIDE: CPT | Performed by: FAMILY MEDICINE

## 2024-11-21 PROCEDURE — 85014 HEMATOCRIT: CPT | Performed by: FAMILY MEDICINE

## 2024-11-21 PROCEDURE — 36415 COLL VENOUS BLD VENIPUNCTURE: CPT | Performed by: FAMILY MEDICINE

## 2024-11-21 PROCEDURE — 82040 ASSAY OF SERUM ALBUMIN: CPT | Performed by: FAMILY MEDICINE

## 2024-11-21 PROCEDURE — 84460 ALANINE AMINO (ALT) (SGPT): CPT | Performed by: FAMILY MEDICINE

## 2024-11-21 PROCEDURE — 84520 ASSAY OF UREA NITROGEN: CPT | Performed by: FAMILY MEDICINE

## 2024-11-21 PROCEDURE — 82247 BILIRUBIN TOTAL: CPT | Performed by: FAMILY MEDICINE

## 2024-11-21 PROCEDURE — 80051 ELECTROLYTE PANEL: CPT | Performed by: FAMILY MEDICINE

## 2024-11-21 PROCEDURE — 85025 COMPLETE CBC W/AUTO DIFF WBC: CPT | Performed by: FAMILY MEDICINE

## 2024-11-21 PROCEDURE — 99283 EMERGENCY DEPT VISIT LOW MDM: CPT | Performed by: FAMILY MEDICINE

## 2024-11-21 PROCEDURE — 85610 PROTHROMBIN TIME: CPT | Performed by: FAMILY MEDICINE

## 2024-11-21 PROCEDURE — 80053 COMPREHEN METABOLIC PANEL: CPT | Performed by: FAMILY MEDICINE

## 2024-11-21 PROCEDURE — 82565 ASSAY OF CREATININE: CPT | Performed by: FAMILY MEDICINE

## 2024-11-21 ASSESSMENT — COLUMBIA-SUICIDE SEVERITY RATING SCALE - C-SSRS
6. HAVE YOU EVER DONE ANYTHING, STARTED TO DO ANYTHING, OR PREPARED TO DO ANYTHING TO END YOUR LIFE?: NO
1. IN THE PAST MONTH, HAVE YOU WISHED YOU WERE DEAD OR WISHED YOU COULD GO TO SLEEP AND NOT WAKE UP?: NO
2. HAVE YOU ACTUALLY HAD ANY THOUGHTS OF KILLING YOURSELF IN THE PAST MONTH?: NO

## 2024-11-21 ASSESSMENT — ACTIVITIES OF DAILY LIVING (ADL)
ADLS_ACUITY_SCORE: 0

## 2024-11-21 NOTE — ED PROVIDER NOTES
HPI   Patient is a 74-year-old female presenting by EMS transport from her [facility] environment with report of blood and mucus in the diaper.  Per my chart review, the patient has a known history of dementia.  She has had a TBI with subarachnoid hemorrhage.  She takes Keppra.  She has hypothyroidism, taking Synthroid.  She has hyperlipidemia, taking simvastatin.    The patient has no complaint.  She denies pain.  She denies obvious bloody stool.  She denies pain with urination or frequency.  No headache.  No chest pain.  No shortness of breath.  She feels well currently.  I was told that staff at the [facility] recognized bright red blood and some mucus in her diaper.  She is known to have hemorrhoids.  No colonoscopy documented on the chart.      Allergies:  Allergies   Allergen Reactions    Penicillins Shortness Of Breath and Hives     Chest heaviness    Contrast Dye Hives     Problem List:    Patient Active Problem List    Diagnosis Date Noted    Acute metabolic encephalopathy 02/01/2018     Priority: Medium    Postoperative hypothyroidism 02/01/2018     Priority: Medium    S/P resection of meningioma 02/01/2018     Priority: Medium    TBI (traumatic brain injury) (H) 02/01/2018     Priority: Medium    Neuropathy 02/01/2018     Priority: Medium    Other hyperlipidemia 02/01/2018     Priority: Medium    Adjustment disorder with depressed mood 02/01/2018     Priority: Medium    Physical deconditioning 02/01/2018     Priority: Medium    Confusion 01/23/2018     Priority: Medium    H/O subarachnoid hemorrhage 03/01/2017     Priority: Medium    S/P thyroidectomy 09/01/2012     Priority: Medium    Multinodular goiter 08/31/2012     Priority: Medium      Past Medical History:    Past Medical History:   Diagnosis Date    Arthritis     Hypercholesterolemia     Hypothyroidism     Tremors      Past Surgical History:    Past Surgical History:   Procedure Laterality Date    APPENDECTOMY      HYSTERECTOMY TOTAL  ABDOMINAL, BILATERAL SALPINGO-OOPHORECTOMY, COMBINED      LUMPECTOMY BREAST      THYROIDECTOMY  8/31/2012    Procedure: THYROIDECTOMY;  Total Thyroidectomy;  Surgeon: Melany Arellano MD;  Location: [facility] OR    TONSILLECTOMY       Family History:    No family history on file.  Social History:  Marital Status:  Single [1]  Social History     Tobacco Use    Smoking status: Every Day    Tobacco comments:     4-5 daily   Substance Use Topics    Alcohol use: Yes     Comment: rarely    Drug use: No      Medications:    Calcium Carb-Cholecalciferol (CALCIUM-VITAMIN D) 600-400 MG-UNIT TABS  FLUoxetine 20 MG tablet  levETIRAcetam (KEPPRA) 500 MG tablet  levothyroxine (SYNTHROID/LEVOTHROID) 100 MCG tablet  pregabalin (LYRICA) 25 MG capsule  simvastatin (ZOCOR) 20 MG tablet      Review of Systems   All other systems reviewed and are negative.      PE   BP: 114/84  Pulse: 70  Temp: 98.6  F (37  C)  Resp: 16  Weight: 83.9 kg (185 lb)  SpO2: 97 %  Physical Exam  Vitals reviewed.   Constitutional:       General: She is not in acute distress.     Appearance: She is well-developed.      Comments: Pleasant, smiling, cooperative.  Answering questions relatively well although she has difficulty with historical details.   HENT:      Head: Normocephalic and atraumatic.      Right Ear: External ear normal.      Left Ear: External ear normal.      Nose: Nose normal.      Mouth/Throat:      Mouth: Mucous membranes are moist.      Pharynx: Oropharynx is clear.   Eyes:      Extraocular Movements: Extraocular movements intact.      Conjunctiva/sclera: Conjunctivae normal.      Pupils: Pupils are equal, round, and reactive to light.   Cardiovascular:      Rate and Rhythm: Normal rate and regular rhythm.   Pulmonary:      Effort: Pulmonary effort is normal.   Abdominal:      Palpations: Abdomen is soft.      Tenderness: There is no abdominal tenderness.   Genitourinary:     Comments: Examination of the perineum and anus was performed with E[facility]  staff in the room.  External hemorrhoids obvious.  These are not swollen or tender.  No bright red blood at the rectum/anus.  No tenderness with finger in the anus.  No evidence for abnormal swelling or abscess.  Perineum and feels unremarkable.  Musculoskeletal:         General: Normal range of motion.      Cervical back: Normal range of motion.   Skin:     General: Skin is warm and dry.   Neurological:      Mental Status: She is alert and oriented to person, place, and time.   Psychiatric:         Behavior: Behavior normal.         ED COURSE and MDM   1402.  Patient with report of bright red blood per rectum, as above.  This has not recurred while here in the ED.  External hemorrhoids present though not the obvious source of bleeding.  Other rectal source?  If recurring and worsening or with systemic symptoms, hospitalization would likely be necessary.  Hemoglobin today is normal.  Vital signs within normal limits.  Patient will be discharged back to the nursing home.    Electronic medical chart reviewed, including medical problems, medications, medical allergies, social history.  Recent hospitalizations and surgical procedures reviewed.  Recent clinic visits and consultations reviewed.  Recent labs and test results reviewed.  Nursing notes reviewed.    The patient, their parent if applicable, and/or their medical decision maker(s) and I have reviewed all of the available historical information, applicable PMH, physical exam findings, and objective diagnostic data gathered during this ED visit.  We then discussed all work-up options and then together agreed upon the course taken during this visit.  The ultimate disposition and plan was a cooperative decision made between myself and the patient, their parent if applicable, and/or their legal decision maker(s).  The risks and benefits of all decisions made during this visit were discussed to the best of my abilities given the circumstances, and all parties are  understanding of the pertinent ramifications of these decisions.      LABS  Labs Ordered and Resulted from Time of ED Arrival to Time of ED Departure   COMPREHENSIVE METABOLIC PANEL - Abnormal       Result Value    Sodium 140      Potassium 3.8      Carbon Dioxide (CO2) 27      Anion Gap 10      Urea Nitrogen 26.2 (*)     Creatinine 1.02 (*)     GFR Estimate 58 (*)     Calcium 8.7 (*)     Chloride 103      Glucose 104 (*)     Alkaline Phosphatase 168 (*)     AST 17      ALT 16      Protein Total 6.9      Albumin 3.4 (*)     Bilirubin Total 0.3     INR - Normal    INR 1.07     CBC WITH PLATELETS AND DIFFERENTIAL    WBC Count 9.6      RBC Count 5.09      Hemoglobin 13.7      Hematocrit 42.7      MCV 84      MCH 26.9      MCHC 32.1      RDW 13.7      Platelet Count 225      % Neutrophils 62      % Lymphocytes 24      % Monocytes 13      % Eosinophils 0      % Basophils 0      % Immature Granulocytes 0      NRBCs per 100 WBC 0      Absolute Neutrophils 6.0      Absolute Lymphocytes 2.3      Absolute Monocytes 1.3      Absolute Eosinophils 0.0      Absolute Basophils 0.0      Absolute Immature Granulocytes 0.0      Absolute NRBCs 0.0         IMAGING  Images reviewed by me.  Radiology report also reviewed.  No orders to display       Procedures    Medications - No data to display      IMPRESSION       ICD-10-CM    1. Bright red blood per rectum  K62.5       2. External hemorrhoids  K64.4                Medication List      There are no discharge medications for this visit.                             Michael Montoya MD  11/21/24 8629

## 2024-11-21 NOTE — DISCHARGE INSTRUCTIONS
RETURN TO THE EMERGENCY ROOM FOR THE FOLLOWING:    Severely worsened bleeding, fainting, new difficulty with breathing or chest pain, new abdominal pain or tenderness, fever greater than 101, or at anytime for any concern.    FOLLOW UP:    With your primary only as needed.    TREATMENT RECOMMENDATIONS:    There have been no new medications provided and there are no prescription medication changes recommended.     NURSE ADVICE LINE:  [phone] or [phone]

## 2024-11-21 NOTE — ED TRIAGE NOTES
Pt comes in from [facility]. This AM staff noticed some bright red blood and mucus in her brief. Hx of hemorrhoids, but this seems like more than this. Pt has no complaints.      Triage Assessment (Adult)       Row Name 11/21/24 1137          Triage Assessment    Airway WDL WDL        Respiratory WDL    Respiratory WDL WDL        Skin Circulation/Temperature WDL    Skin Circulation/Temperature WDL WDL        Cardiac WDL    Cardiac WDL WDL        Peripheral/Neurovascular WDL    Peripheral Neurovascular WDL WDL

## 2025-01-15 ENCOUNTER — LAB REQUISITION (OUTPATIENT)
Dept: LAB | Facility: CLINIC | Age: 75
End: 2025-01-15
Payer: MEDICARE

## 2025-01-15 DIAGNOSIS — G40.909 EPILEPSY, UNSPECIFIED, NOT INTRACTABLE, WITHOUT STATUS EPILEPTICUS (H): ICD-10-CM

## 2025-01-15 DIAGNOSIS — E03.9 HYPOTHYROIDISM, UNSPECIFIED: ICD-10-CM

## 2025-01-15 DIAGNOSIS — R26.89 OTHER ABNORMALITIES OF GAIT AND MOBILITY: ICD-10-CM

## 2025-01-15 DIAGNOSIS — I10 ESSENTIAL (PRIMARY) HYPERTENSION: ICD-10-CM

## 2025-01-15 DIAGNOSIS — G30.1 ALZHEIMER'S DISEASE WITH LATE ONSET (CODE) (H): ICD-10-CM

## 2025-01-15 PROCEDURE — P9603 ONE-WAY ALLOW PRORATED MILES: HCPCS | Mod: ORL | Performed by: FAMILY MEDICINE

## 2025-01-15 PROCEDURE — 80048 BASIC METABOLIC PNL TOTAL CA: CPT | Mod: ORL | Performed by: FAMILY MEDICINE

## 2025-01-15 PROCEDURE — 36415 COLL VENOUS BLD VENIPUNCTURE: CPT | Mod: ORL | Performed by: FAMILY MEDICINE

## 2025-01-15 PROCEDURE — 84460 ALANINE AMINO (ALT) (SGPT): CPT | Mod: ORL | Performed by: FAMILY MEDICINE

## 2025-01-15 PROCEDURE — 84443 ASSAY THYROID STIM HORMONE: CPT | Mod: ORL | Performed by: FAMILY MEDICINE

## 2025-01-15 PROCEDURE — 85027 COMPLETE CBC AUTOMATED: CPT | Mod: ORL | Performed by: FAMILY MEDICINE

## 2025-01-16 LAB
ALT SERPL W P-5'-P-CCNC: 21 U/L (ref 0–50)
ANION GAP SERPL CALCULATED.3IONS-SCNC: 9 MMOL/L (ref 7–15)
BUN SERPL-MCNC: 20.9 MG/DL (ref 8–23)
CALCIUM SERPL-MCNC: 8.9 MG/DL (ref 8.8–10.4)
CHLORIDE SERPL-SCNC: 105 MMOL/L (ref 98–107)
CREAT SERPL-MCNC: 0.78 MG/DL (ref 0.51–0.95)
EGFRCR SERPLBLD CKD-EPI 2021: 80 ML/MIN/1.73M2
ERYTHROCYTE [DISTWIDTH] IN BLOOD BY AUTOMATED COUNT: 15.6 % (ref 10–15)
GLUCOSE SERPL-MCNC: 87 MG/DL (ref 70–99)
HCO3 SERPL-SCNC: 27 MMOL/L (ref 22–29)
HCT VFR BLD AUTO: 44.9 % (ref 35–47)
HGB BLD-MCNC: 13.5 G/DL (ref 11.7–15.7)
MCH RBC QN AUTO: 25.8 PG (ref 26.5–33)
MCHC RBC AUTO-ENTMCNC: 30.1 G/DL (ref 31.5–36.5)
MCV RBC AUTO: 86 FL (ref 78–100)
PLATELET # BLD AUTO: 219 10E3/UL (ref 150–450)
POTASSIUM SERPL-SCNC: 4.5 MMOL/L (ref 3.4–5.3)
RBC # BLD AUTO: 5.23 10E6/UL (ref 3.8–5.2)
SODIUM SERPL-SCNC: 141 MMOL/L (ref 135–145)
TSH SERPL DL<=0.005 MIU/L-ACNC: 0.02 UIU/ML (ref 0.3–4.2)
WBC # BLD AUTO: 5.6 10E3/UL (ref 4–11)

## 2025-02-07 ENCOUNTER — LAB REQUISITION (OUTPATIENT)
Dept: LAB | Facility: CLINIC | Age: 75
End: 2025-02-07
Payer: MEDICARE

## 2025-02-07 DIAGNOSIS — R30.0 DYSURIA: ICD-10-CM

## 2025-02-07 LAB
ALBUMIN UR-MCNC: 10 MG/DL
AMORPH CRY #/AREA URNS HPF: ABNORMAL /HPF
APPEARANCE UR: ABNORMAL
BILIRUB UR QL STRIP: NEGATIVE
COLOR UR AUTO: YELLOW
GLUCOSE UR STRIP-MCNC: NEGATIVE MG/DL
GRANULAR CAST: 3 /LPF
HGB UR QL STRIP: NEGATIVE
KETONES UR STRIP-MCNC: NEGATIVE MG/DL
LEUKOCYTE ESTERASE UR QL STRIP: NEGATIVE
MUCOUS THREADS #/AREA URNS LPF: PRESENT /LPF
NITRATE UR QL: NEGATIVE
PH UR STRIP: 8 [PH] (ref 5–7)
RBC URINE: 1 /HPF
SP GR UR STRIP: 1.02 (ref 1–1.03)
SQUAMOUS EPITHELIAL: 9 /HPF
UROBILINOGEN UR STRIP-MCNC: NORMAL MG/DL
WBC URINE: <1 /HPF

## 2025-02-07 PROCEDURE — 81001 URINALYSIS AUTO W/SCOPE: CPT | Mod: ORL | Performed by: FAMILY MEDICINE

## 2025-02-07 PROCEDURE — 87086 URINE CULTURE/COLONY COUNT: CPT | Mod: ORL | Performed by: FAMILY MEDICINE

## 2025-02-08 LAB
BACTERIA UR CULT: NORMAL
BACTERIA UR CULT: NORMAL

## 2025-03-04 ENCOUNTER — LAB REQUISITION (OUTPATIENT)
Dept: LAB | Facility: CLINIC | Age: 75
End: 2025-03-04
Payer: MEDICARE

## 2025-03-04 DIAGNOSIS — G40.909 EPILEPSY, UNSPECIFIED, NOT INTRACTABLE, WITHOUT STATUS EPILEPTICUS (H): ICD-10-CM

## 2025-03-04 DIAGNOSIS — G30.1 ALZHEIMER'S DISEASE WITH LATE ONSET (CODE) (H): ICD-10-CM

## 2025-03-04 DIAGNOSIS — G63 POLYNEUROPATHY IN DISEASES CLASSIFIED ELSEWHERE: ICD-10-CM

## 2025-03-04 DIAGNOSIS — E03.9 HYPOTHYROIDISM, UNSPECIFIED: ICD-10-CM

## 2025-03-06 LAB — TSH SERPL DL<=0.005 MIU/L-ACNC: 0.44 UIU/ML (ref 0.3–4.2)

## 2025-03-06 PROCEDURE — 36415 COLL VENOUS BLD VENIPUNCTURE: CPT | Mod: ORL | Performed by: FAMILY MEDICINE

## 2025-03-06 PROCEDURE — 84443 ASSAY THYROID STIM HORMONE: CPT | Mod: ORL | Performed by: FAMILY MEDICINE

## 2025-03-06 PROCEDURE — P9604 ONE-WAY ALLOW PRORATED TRIP: HCPCS | Mod: ORL | Performed by: FAMILY MEDICINE

## 2025-08-25 ENCOUNTER — LAB REQUISITION (OUTPATIENT)
Dept: LAB | Facility: CLINIC | Age: 75
End: 2025-08-25
Payer: MEDICARE

## 2025-08-25 DIAGNOSIS — Z13.1 ENCOUNTER FOR SCREENING FOR DIABETES MELLITUS: ICD-10-CM

## 2025-08-25 DIAGNOSIS — I73.9 PERIPHERAL VASCULAR DISEASE, UNSPECIFIED: ICD-10-CM

## 2025-08-25 DIAGNOSIS — G30.1 ALZHEIMER'S DISEASE WITH LATE ONSET (CODE) (H): ICD-10-CM

## 2025-08-25 DIAGNOSIS — F33.9 MAJOR DEPRESSIVE DISORDER, RECURRENT, UNSPECIFIED: ICD-10-CM

## 2025-08-25 DIAGNOSIS — E55.9 VITAMIN D DEFICIENCY, UNSPECIFIED: ICD-10-CM

## 2025-08-25 DIAGNOSIS — E03.9 HYPOTHYROIDISM, UNSPECIFIED: ICD-10-CM

## 2025-08-26 LAB
ALBUMIN SERPL BCG-MCNC: 3.8 G/DL (ref 3.5–5.2)
ALP SERPL-CCNC: 125 U/L (ref 40–150)
ALT SERPL W P-5'-P-CCNC: 20 U/L (ref 0–50)
ANION GAP SERPL CALCULATED.3IONS-SCNC: 12 MMOL/L (ref 7–15)
AST SERPL W P-5'-P-CCNC: 26 U/L (ref 0–45)
BILIRUB SERPL-MCNC: 0.3 MG/DL
BUN SERPL-MCNC: 16.6 MG/DL (ref 8–23)
CALCIUM SERPL-MCNC: 8.8 MG/DL (ref 8.8–10.4)
CHLORIDE SERPL-SCNC: 104 MMOL/L (ref 98–107)
CHOLEST SERPL-MCNC: 247 MG/DL
CREAT SERPL-MCNC: 0.8 MG/DL (ref 0.51–0.95)
EGFRCR SERPLBLD CKD-EPI 2021: 78 ML/MIN/1.73M2
ERYTHROCYTE [DISTWIDTH] IN BLOOD BY AUTOMATED COUNT: 14.2 % (ref 10–15)
EST. AVERAGE GLUCOSE BLD GHB EST-MCNC: 120 MG/DL
FASTING STATUS PATIENT QL REPORTED: ABNORMAL
GLUCOSE SERPL-MCNC: 83 MG/DL (ref 70–99)
HBA1C MFR BLD: 5.8 %
HCO3 SERPL-SCNC: 29 MMOL/L (ref 22–29)
HCT VFR BLD AUTO: 46.4 % (ref 35–47)
HDLC SERPL-MCNC: 54 MG/DL
HGB BLD-MCNC: 14.2 G/DL (ref 11.7–15.7)
LDLC SERPL CALC-MCNC: 170 MG/DL
MCH RBC QN AUTO: 28.3 PG (ref 26.5–33)
MCHC RBC AUTO-ENTMCNC: 30.6 G/DL (ref 31.5–36.5)
MCV RBC AUTO: 92.4 FL (ref 78–100)
NONHDLC SERPL-MCNC: 193 MG/DL
PLATELET # BLD AUTO: 230 10E3/UL (ref 150–450)
POTASSIUM SERPL-SCNC: 3.8 MMOL/L (ref 3.4–5.3)
PROT SERPL-MCNC: 6.6 G/DL (ref 6.4–8.3)
RBC # BLD AUTO: 5.02 10E6/UL (ref 3.8–5.2)
SODIUM SERPL-SCNC: 145 MMOL/L (ref 135–145)
T4 FREE SERPL-MCNC: 1.03 NG/DL (ref 0.9–1.7)
TRIGL SERPL-MCNC: 115 MG/DL
TSH SERPL DL<=0.005 MIU/L-ACNC: 19 UIU/ML (ref 0.3–4.2)
VIT B12 SERPL-MCNC: 1126 PG/ML (ref 232–1245)
VIT D+METAB SERPL-MCNC: 37 NG/ML (ref 20–50)
WBC # BLD AUTO: 7.15 10E3/UL (ref 4–11)